# Patient Record
Sex: MALE | Race: WHITE | NOT HISPANIC OR LATINO | URBAN - METROPOLITAN AREA
[De-identification: names, ages, dates, MRNs, and addresses within clinical notes are randomized per-mention and may not be internally consistent; named-entity substitution may affect disease eponyms.]

---

## 2023-08-09 ENCOUNTER — OFFICE (OUTPATIENT)
Dept: URBAN - METROPOLITAN AREA PATHOLOGY 4 | Facility: PATHOLOGY | Age: 73
End: 2023-08-09
Payer: MEDICARE

## 2023-08-09 ENCOUNTER — OFFICE (OUTPATIENT)
Dept: URBAN - METROPOLITAN AREA PATHOLOGY 4 | Facility: PATHOLOGY | Age: 73
End: 2023-08-09
Payer: COMMERCIAL

## 2023-08-09 ENCOUNTER — ON CAMPUS - OUTPATIENT (OUTPATIENT)
Dept: URBAN - METROPOLITAN AREA HOSPITAL 2 | Facility: HOSPITAL | Age: 73
End: 2023-08-09
Payer: MEDICARE

## 2023-08-09 VITALS
DIASTOLIC BLOOD PRESSURE: 53 MMHG | RESPIRATION RATE: 16 BRPM | OXYGEN SATURATION: 99 % | SYSTOLIC BLOOD PRESSURE: 95 MMHG | HEART RATE: 80 BPM | DIASTOLIC BLOOD PRESSURE: 54 MMHG | DIASTOLIC BLOOD PRESSURE: 57 MMHG | SYSTOLIC BLOOD PRESSURE: 149 MMHG | SYSTOLIC BLOOD PRESSURE: 141 MMHG | DIASTOLIC BLOOD PRESSURE: 72 MMHG | OXYGEN SATURATION: 100 % | SYSTOLIC BLOOD PRESSURE: 121 MMHG | SYSTOLIC BLOOD PRESSURE: 96 MMHG | HEART RATE: 66 BPM | HEART RATE: 67 BPM | DIASTOLIC BLOOD PRESSURE: 67 MMHG | HEART RATE: 69 BPM | WEIGHT: 146 LBS | SYSTOLIC BLOOD PRESSURE: 137 MMHG | RESPIRATION RATE: 12 BRPM | DIASTOLIC BLOOD PRESSURE: 81 MMHG | SYSTOLIC BLOOD PRESSURE: 105 MMHG | SYSTOLIC BLOOD PRESSURE: 119 MMHG | DIASTOLIC BLOOD PRESSURE: 75 MMHG | HEART RATE: 74 BPM | SYSTOLIC BLOOD PRESSURE: 103 MMHG | HEART RATE: 75 BPM | DIASTOLIC BLOOD PRESSURE: 87 MMHG | HEART RATE: 73 BPM | SYSTOLIC BLOOD PRESSURE: 97 MMHG | DIASTOLIC BLOOD PRESSURE: 63 MMHG | DIASTOLIC BLOOD PRESSURE: 70 MMHG | HEIGHT: 75 IN | SYSTOLIC BLOOD PRESSURE: 163 MMHG | HEART RATE: 71 BPM | HEART RATE: 84 BPM | OXYGEN SATURATION: 98 % | HEART RATE: 79 BPM | TEMPERATURE: 97.3 F

## 2023-08-09 DIAGNOSIS — K57.30 DIVERTICULOSIS OF LARGE INTESTINE WITHOUT PERFORATION OR ABS: ICD-10-CM

## 2023-08-09 DIAGNOSIS — D12.0 BENIGN NEOPLASM OF CECUM: ICD-10-CM

## 2023-08-09 DIAGNOSIS — C15.9 MALIGNANT NEOPLASM OF ESOPHAGUS, UNSPECIFIED: ICD-10-CM

## 2023-08-09 DIAGNOSIS — D12.3 BENIGN NEOPLASM OF TRANSVERSE COLON: ICD-10-CM

## 2023-08-09 DIAGNOSIS — Z12.11 ENCOUNTER FOR SCREENING FOR MALIGNANT NEOPLASM OF COLON: ICD-10-CM

## 2023-08-09 DIAGNOSIS — R13.10 DYSPHAGIA, UNSPECIFIED: ICD-10-CM

## 2023-08-09 DIAGNOSIS — K44.9 DIAPHRAGMATIC HERNIA WITHOUT OBSTRUCTION OR GANGRENE: ICD-10-CM

## 2023-08-09 PROBLEM — D37.8 NEOPLASM OF UNCERTAIN BEHAVIOR OF OTHER SPECIFIED DIGESTIVE: Status: ACTIVE | Noted: 2023-08-09

## 2023-08-09 PROBLEM — K63.5 POLYP OF COLON: Status: ACTIVE | Noted: 2023-08-09

## 2023-08-09 LAB
GI HISTOLOGY: A. SELECT: (no result)
GI HISTOLOGY: B. UNSPECIFIED: (no result)
GI HISTOLOGY: C. UNSPECIFIED: (no result)
GI HISTOLOGY: PDF REPORT: (no result)

## 2023-08-09 PROCEDURE — 88305 TISSUE EXAM BY PATHOLOGIST: CPT | Mod: 26 | Performed by: INTERNAL MEDICINE

## 2023-08-09 PROCEDURE — 88342 IMHCHEM/IMCYTCHM 1ST ANTB: CPT | Mod: 26 | Performed by: INTERNAL MEDICINE

## 2023-08-09 PROCEDURE — 43239 EGD BIOPSY SINGLE/MULTIPLE: CPT | Performed by: INTERNAL MEDICINE

## 2023-08-09 PROCEDURE — 45385 COLONOSCOPY W/LESION REMOVAL: CPT | Mod: PT | Performed by: INTERNAL MEDICINE

## 2023-08-09 PROCEDURE — 88341 IMHCHEM/IMCYTCHM EA ADD ANTB: CPT | Mod: 26 | Performed by: INTERNAL MEDICINE

## 2023-08-11 PROBLEM — R13.10 DYSPHAGIA: Status: ACTIVE | Noted: 2023-08-11

## 2023-08-11 PROBLEM — K22.89 ESOPHAGEAL MASS: Status: ACTIVE | Noted: 2023-08-11

## 2023-08-11 PROBLEM — K21.9 GERD (GASTROESOPHAGEAL REFLUX DISEASE): Status: ACTIVE | Noted: 2023-08-11

## 2023-08-11 PROBLEM — K63.5 COLON POLYP: Status: ACTIVE | Noted: 2023-08-11

## 2023-08-11 PROBLEM — K57.90 DIVERTICULOSIS: Status: ACTIVE | Noted: 2023-08-11

## 2023-08-11 PROBLEM — K64.9 HEMORRHOIDS: Status: ACTIVE | Noted: 2023-08-11

## 2023-08-11 PROBLEM — Z87.19 HISTORY OF HIATAL HERNIA: Status: ACTIVE | Noted: 2023-08-11

## 2023-08-11 PROBLEM — J43.9 EMPHYSEMA LUNG: Status: ACTIVE | Noted: 2023-08-11

## 2023-08-11 NOTE — PROGRESS NOTES
HEMATOLOGY ONCOLOGY OUTPATIENT CONSULTATION       Patient name: Aravind Lim  : 1950  MRN: [id number]  Primary Care Physician: Provider, No Known  Referring Physician: Provider, No Known  Reason For Consult: Esophageal cancer      History of Present Illness:  Patient is a 73 y.o. male with recently diagnosed esophageal adenocarcinoma.    Patient has been having difficulty swallowing for the 5 months prior to presentation this is been progressing gradually.  Patient has been an active smoker with 50-pack-year smoking history.  He denies significant alcohol intake or GERD history.  His family history is positive for malignancy and 2 sisters and one nephew but he is unsure of the type of malignancy.  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  Enlarged mesenteric lymph node      40 pound weight loss, not a very good apettite  Subjective:  Patient presents for initial consultation.      Past Medical History:   Diagnosis Date    Anemia 2023    Anxiety 2022    Bilateral cataracts 2023    Colon polyp     Constipation 2022    BM daily w/ mineral oil.    COPD (chronic obstructive pulmonary disease)     COVID-19 2022    Dysphagia     Fatigue 2022    GERD (gastroesophageal reflux disease)     History of hiatal hernia     Impacted cerumen of right ear 2022    Prediabetes 05/10/2022       History reviewed. No pertinent surgical history.      Current Outpatient Medications:     albuterol sulfate  (90 Base) MCG/ACT inhaler, Inhale 2 puffs Every 4 (Four) Hours As Needed., Disp: , Rfl:     budesonide-formoterol (SYMBICORT)  "160-4.5 MCG/ACT inhaler, Inhale 2 puffs 2 (Two) Times a Day., Disp: , Rfl:     Cholecalciferol 50 MCG (2000 UT) tablet, Take 1 tablet by mouth Daily., Disp: , Rfl:     omeprazole (priLOSEC) 40 MG capsule, Take 1 capsule by mouth Every Morning., Disp: , Rfl:     Sorbitol 70 % solution, TAKE 100 ML BY MOUTH as directed FOR ONE DAY (Patient not taking: Reported on 8/21/2023), Disp: , Rfl:   No current facility-administered medications for this visit.    No Known Allergies    Family History   Problem Relation Age of Onset    Diabetes Mother     Diabetes Father     Cancer Sister         Unknown of type       Cancer-related family history includes Cancer in his sister.      Social History     Tobacco Use    Smoking status: Every Day     Packs/day: 1.00     Types: Cigarettes   Vaping Use    Vaping Use: Never used   Substance Use Topics    Alcohol use: Never    Drug use: Never     Social History     Social History Narrative    Not on file       ROS:   Review of Systems   Constitutional:  Positive for fatigue. Negative for fever.   HENT:  Positive for trouble swallowing. Negative for congestion and nosebleeds.    Eyes:  Negative for pain.   Respiratory:  Negative for cough and shortness of breath.    Cardiovascular:  Negative for chest pain.   Gastrointestinal:  Negative for abdominal pain, blood in stool, diarrhea, nausea and vomiting.   Endocrine: Negative for cold intolerance and heat intolerance.   Genitourinary:  Negative for difficulty urinating.   Musculoskeletal:  Negative for arthralgias.   Skin:  Negative for rash.   Neurological:  Negative for dizziness and headaches.   Hematological:  Does not bruise/bleed easily.   Psychiatric/Behavioral:  Negative for behavioral problems.        Objective:    Vital Signs:  Vitals:    08/14/23 1419   BP: 115/78   Pulse: 86   SpO2: 97%   Weight: 66.5 kg (146 lb 9.6 oz)   Height: 190.5 cm (75\")   PainSc: 0-No pain     Body mass index is 18.32 kg/mý.    ECOG  (1) Restricted in " physically strenuous activity, ambulatory and able to do work of light nature    Physical Exam:   Physical Exam  Constitutional:       Appearance: Normal appearance.   HENT:      Head: Normocephalic and atraumatic.   Eyes:      Pupils: Pupils are equal, round, and reactive to light.   Cardiovascular:      Rate and Rhythm: Normal rate and regular rhythm.      Pulses: Normal pulses.      Heart sounds: No murmur heard.  Pulmonary:      Effort: Pulmonary effort is normal.      Breath sounds: Normal breath sounds.   Abdominal:      General: There is no distension.      Palpations: Abdomen is soft. There is no mass.      Tenderness: There is no abdominal tenderness.   Musculoskeletal:         General: Normal range of motion.      Cervical back: Normal range of motion and neck supple.   Skin:     General: Skin is warm.   Neurological:      General: No focal deficit present.      Mental Status: He is alert.   Psychiatric:         Mood and Affect: Mood normal.       Lab Results - Last 18 Months   Lab Units 08/14/23  1418   WBC 10*3/mm3 8.31   HEMOGLOBIN g/dL 12.6*   HEMATOCRIT % 38.3   PLATELETS 10*3/mm3 306   MCV fL 89.9     Lab Results - Last 18 Months   Lab Units 08/14/23  1418   SODIUM mmol/L 131*   POTASSIUM mmol/L 4.6   CHLORIDE mmol/L 94*   CO2 mmol/L 26.0   BUN mg/dL 14   CREATININE mg/dL 0.77   CALCIUM mg/dL 9.4   BILIRUBIN mg/dL 0.2   ALK PHOS U/L 69   ALT (SGPT) U/L 12   AST (SGOT) U/L 17   GLUCOSE mg/dL 87       Lab Results   Component Value Date    GLUCOSE 87 08/14/2023    BUN 14 08/14/2023    CREATININE 0.77 08/14/2023    BCR 18.2 08/14/2023    K 4.6 08/14/2023    CO2 26.0 08/14/2023    CALCIUM 9.4 08/14/2023    ALBUMIN 4.1 08/14/2023    AST 17 08/14/2023    ALT 12 08/14/2023       No results for input(s): APTT, INR, PTT in the last 05378 hours.    No results found for: IRON, TIBC, FERRITIN    No results found for: FOLATE    No results found for: OCCULTBLD    No results found for: RETICCTPCT  No results found  for: [id number]  No results found for: SPEP, UPEP  No results found for: LDH, URICACID  No results found for: LEO, RF, SEDRATE  No results found for: FIBRINOGEN, HAPTOGLOBIN  No results found for: PTT, INR  No results found for:   No results found for: CEA  No components found for: CA-19-9  No results found for: PSA  No results found for: SEDRATE       Assessment & Plan     Patient is a 73-year-old male with esophageal adenocarcinoma of the distal esophagus.    Esophageal adenocarcinoma  Given CT imaging there is concern for metastatic lymph node in the mesentery  We will plan for PET/CT for staging  If no distant metastasis treatment would include chemoradiation followed by surgical resection  Patient rescheduled to see Dr. Colorado with radiation oncology, he has also been referred to Dr. Romero at [facility] with thoracic surgery  Will discuss patient's case at our multidisciplinary tumor board conference should have PET/CT results by then  If metastatic disease, will need NGS testing HER2 analysis and PD-L1 and start systemic treatment  If localized disease treatment as above      Follow-up after above work-up is done    Thank you very much for providing the opportunity to participate in this patient's care. Please do not hesitate to call if there are any other questions.  Time spent on encounter including record review, history taking, exam, discussion, counseling and documentation at: 60 minutes

## 2023-08-14 ENCOUNTER — LAB (OUTPATIENT)
Dept: LAB | Facility: HOSPITAL | Age: 73
End: 2023-08-14
Payer: MEDICARE

## 2023-08-14 ENCOUNTER — CONSULT (OUTPATIENT)
Dept: ONCOLOGY | Facility: CLINIC | Age: 73
End: 2023-08-14
Payer: MEDICARE

## 2023-08-14 VITALS
OXYGEN SATURATION: 97 % | WEIGHT: 146.6 LBS | SYSTOLIC BLOOD PRESSURE: 115 MMHG | HEIGHT: 75 IN | HEART RATE: 86 BPM | BODY MASS INDEX: 18.23 KG/M2 | DIASTOLIC BLOOD PRESSURE: 78 MMHG

## 2023-08-14 DIAGNOSIS — R93.5 ABNORMAL FINDINGS ON DIAGNOSTIC IMAGING OF OTHER ABDOMINAL REGIONS, INCLUDING RETROPERITONEUM: ICD-10-CM

## 2023-08-14 DIAGNOSIS — K22.89 ESOPHAGEAL MASS: Primary | ICD-10-CM

## 2023-08-14 LAB
ALBUMIN SERPL-MCNC: 4.1 G/DL (ref 3.5–5.2)
ALBUMIN/GLOB SERPL: 1.5 G/DL
ALP SERPL-CCNC: 69 U/L (ref 39–117)
ALT SERPL W P-5'-P-CCNC: 12 U/L (ref 1–41)
ANION GAP SERPL CALCULATED.3IONS-SCNC: 11 MMOL/L (ref 5–15)
AST SERPL-CCNC: 17 U/L (ref 1–40)
BASOPHILS # BLD AUTO: 0.01 10*3/MM3 (ref 0–0.2)
BASOPHILS NFR BLD AUTO: 0.1 % (ref 0–1.5)
BILIRUB SERPL-MCNC: 0.2 MG/DL (ref 0–1.2)
BUN SERPL-MCNC: 14 MG/DL (ref 8–23)
BUN/CREAT SERPL: 18.2 (ref 7–25)
CALCIUM SPEC-SCNC: 9.4 MG/DL (ref 8.6–10.5)
CHLORIDE SERPL-SCNC: 94 MMOL/L (ref 98–107)
CO2 SERPL-SCNC: 26 MMOL/L (ref 22–29)
CREAT SERPL-MCNC: 0.77 MG/DL (ref 0.76–1.27)
DEPRECATED RDW RBC AUTO: 44.1 FL (ref 37–54)
EGFRCR SERPLBLD CKD-EPI 2021: 95.1 ML/MIN/1.73
EOSINOPHIL # BLD AUTO: 0.16 10*3/MM3 (ref 0–0.4)
EOSINOPHIL NFR BLD AUTO: 1.9 % (ref 0.3–6.2)
ERYTHROCYTE [DISTWIDTH] IN BLOOD BY AUTOMATED COUNT: 13.8 % (ref 12.3–15.4)
GLOBULIN UR ELPH-MCNC: 2.7 GM/DL
GLUCOSE SERPL-MCNC: 87 MG/DL (ref 65–99)
HCT VFR BLD AUTO: 38.3 % (ref 37.5–51)
HGB BLD-MCNC: 12.6 G/DL (ref 13–17.7)
HOLD SPECIMEN: NORMAL
LYMPHOCYTES # BLD AUTO: 2.03 10*3/MM3 (ref 0.7–3.1)
LYMPHOCYTES NFR BLD AUTO: 24.4 % (ref 19.6–45.3)
MCH RBC QN AUTO: 29.6 PG (ref 26.6–33)
MCHC RBC AUTO-ENTMCNC: 32.9 G/DL (ref 31.5–35.7)
MCV RBC AUTO: 89.9 FL (ref 79–97)
MONOCYTES # BLD AUTO: 0.81 10*3/MM3 (ref 0.1–0.9)
MONOCYTES NFR BLD AUTO: 9.7 % (ref 5–12)
NEUTROPHILS NFR BLD AUTO: 5.3 10*3/MM3 (ref 1.7–7)
NEUTROPHILS NFR BLD AUTO: 63.9 % (ref 42.7–76)
PLATELET # BLD AUTO: 306 10*3/MM3 (ref 140–450)
PMV BLD AUTO: 9.3 FL (ref 6–12)
POTASSIUM SERPL-SCNC: 4.6 MMOL/L (ref 3.5–5.2)
PROT SERPL-MCNC: 6.8 G/DL (ref 6–8.5)
RBC # BLD AUTO: 4.26 10*6/MM3 (ref 4.14–5.8)
SODIUM SERPL-SCNC: 131 MMOL/L (ref 136–145)
WBC NRBC COR # BLD: 8.31 10*3/MM3 (ref 3.4–10.8)

## 2023-08-14 PROCEDURE — 99205 OFFICE O/P NEW HI 60 MIN: CPT | Performed by: INTERNAL MEDICINE

## 2023-08-14 PROCEDURE — 1126F AMNT PAIN NOTED NONE PRSNT: CPT | Performed by: INTERNAL MEDICINE

## 2023-08-14 PROCEDURE — 85025 COMPLETE CBC W/AUTO DIFF WBC: CPT | Performed by: INTERNAL MEDICINE

## 2023-08-14 PROCEDURE — 36415 COLL VENOUS BLD VENIPUNCTURE: CPT

## 2023-08-14 PROCEDURE — 80053 COMPREHEN METABOLIC PANEL: CPT | Performed by: INTERNAL MEDICINE

## 2023-08-14 RX ORDER — ALBUTEROL SULFATE 90 UG/1
2 AEROSOL, METERED RESPIRATORY (INHALATION) EVERY 4 HOURS PRN
COMMUNITY

## 2023-08-14 RX ORDER — OMEPRAZOLE 40 MG/1
1 CAPSULE, DELAYED RELEASE ORAL EVERY MORNING
COMMUNITY
Start: 2023-08-04

## 2023-08-14 RX ORDER — FAMOTIDINE 40 MG/1
40 TABLET, FILM COATED ORAL EVERY MORNING
COMMUNITY
Start: 2023-06-23 | End: 2023-08-14

## 2023-08-14 RX ORDER — BUDESONIDE AND FORMOTEROL FUMARATE DIHYDRATE 160; 4.5 UG/1; UG/1
2 AEROSOL RESPIRATORY (INHALATION) 2 TIMES DAILY
COMMUNITY

## 2023-08-14 NOTE — LETTER
2023     Heavenly Dominguez MD  [address] [facility] [place] [address]    Patient: Aravind Lim   YOB: 1950   Date of Visit: 2023     Dear Heavenly Dominguez MD:       Thank you for referring Aravind Lim to me for evaluation. Below are the relevant portions of my assessment and plan of care.    If you have questions, please do not hesitate to call me. I look forward to following Aravind along with you.         Sincerely,        Sophie Benito MD        CC: No Recipients    Sophie Benito MD  23 [id number]  Sign when Signing Visit                           HEMATOLOGY ONCOLOGY OUTPATIENT CONSULTATION       Patient name: Aravind Lim  : 1950  MRN: [id number]  Primary Care Physician: Provider, No Known  Referring Physician: Provider, No Known  Reason For Consult: Esophageal cancer      History of Present Illness:  Patient is a 73 y.o. male with recently diagnosed esophageal adenocarcinoma.    Patient has been having difficulty swallowing for the 5 months prior to presentation this is been progressing gradually.  Patient has been an active smoker with 50-pack-year smoking history.  He denies significant alcohol intake or GERD history.  His family history is positive for malignancy and 2 sisters and one nephew but he is unsure of the type of malignancy.  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  Enlarged mesenteric lymph node      40 pound weight loss, not a very good apettite  Subjective:  Patient presents for initial consultation.      Past Medical History:   Diagnosis Date    Anemia 2023    Anxiety 2022    Bilateral cataracts 2023     Colon polyp     Constipation 05/03/2022    BM daily w/ mineral oil.    COPD (chronic obstructive pulmonary disease)     COVID-19 08/16/2022    Dysphagia     Fatigue 05/03/2022    GERD (gastroesophageal reflux disease)     History of hiatal hernia     Impacted cerumen of right ear 05/03/2022    Prediabetes 05/10/2022       History reviewed. No pertinent surgical history.      Current Outpatient Medications:     albuterol sulfate  (90 Base) MCG/ACT inhaler, Inhale 2 puffs Every 4 (Four) Hours As Needed., Disp: , Rfl:     budesonide-formoterol (SYMBICORT) 160-4.5 MCG/ACT inhaler, Inhale 2 puffs 2 (Two) Times a Day., Disp: , Rfl:     Cholecalciferol 50 MCG (2000 UT) tablet, Take 1 tablet by mouth Daily., Disp: , Rfl:     omeprazole (priLOSEC) 40 MG capsule, Take 1 capsule by mouth Every Morning., Disp: , Rfl:     Sorbitol 70 % solution, TAKE 100 ML BY MOUTH as directed FOR ONE DAY (Patient not taking: Reported on 8/21/2023), Disp: , Rfl:   No current facility-administered medications for this visit.    No Known Allergies    Family History   Problem Relation Age of Onset    Diabetes Mother     Diabetes Father     Cancer Sister         Unknown of type       Cancer-related family history includes Cancer in his sister.      Social History     Tobacco Use    Smoking status: Every Day     Packs/day: 1.00     Types: Cigarettes   Vaping Use    Vaping Use: Never used   Substance Use Topics    Alcohol use: Never    Drug use: Never     Social History     Social History Narrative    Not on file       ROS:   Review of Systems   Constitutional:  Positive for fatigue. Negative for fever.   HENT:  Positive for trouble swallowing. Negative for congestion and nosebleeds.    Eyes:  Negative for pain.   Respiratory:  Negative for cough and shortness of breath.    Cardiovascular:  Negative for chest pain.   Gastrointestinal:  Negative for abdominal pain, blood in stool, diarrhea, nausea and vomiting.  "  Endocrine: Negative for cold intolerance and heat intolerance.   Genitourinary:  Negative for difficulty urinating.   Musculoskeletal:  Negative for arthralgias.   Skin:  Negative for rash.   Neurological:  Negative for dizziness and headaches.   Hematological:  Does not bruise/bleed easily.   Psychiatric/Behavioral:  Negative for behavioral problems.        Objective:    Vital Signs:  Vitals:    08/14/23 1419   BP: 115/78   Pulse: 86   SpO2: 97%   Weight: 66.5 kg (146 lb 9.6 oz)   Height: 190.5 cm (75\")   PainSc: 0-No pain     Body mass index is 18.32 kg/mý.    ECOG  (1) Restricted in physically strenuous activity, ambulatory and able to do work of light nature    Physical Exam:   Physical Exam  Constitutional:       Appearance: Normal appearance.   HENT:      Head: Normocephalic and atraumatic.   Eyes:      Pupils: Pupils are equal, round, and reactive to light.   Cardiovascular:      Rate and Rhythm: Normal rate and regular rhythm.      Pulses: Normal pulses.      Heart sounds: No murmur heard.  Pulmonary:      Effort: Pulmonary effort is normal.      Breath sounds: Normal breath sounds.   Abdominal:      General: There is no distension.      Palpations: Abdomen is soft. There is no mass.      Tenderness: There is no abdominal tenderness.   Musculoskeletal:         General: Normal range of motion.      Cervical back: Normal range of motion and neck supple.   Skin:     General: Skin is warm.   Neurological:      General: No focal deficit present.      Mental Status: He is alert.   Psychiatric:         Mood and Affect: Mood normal.       Lab Results - Last 18 Months   Lab Units 08/14/23  1418   WBC 10*3/mm3 8.31   HEMOGLOBIN g/dL 12.6*   HEMATOCRIT % 38.3   PLATELETS 10*3/mm3 306   MCV fL 89.9     Lab Results - Last 18 Months   Lab Units 08/14/23  1418   SODIUM mmol/L 131*   POTASSIUM mmol/L 4.6   CHLORIDE mmol/L 94*   CO2 mmol/L 26.0   BUN mg/dL 14   CREATININE mg/dL 0.77   CALCIUM mg/dL 9.4   BILIRUBIN mg/dL " 0.2   ALK PHOS U/L 69   ALT (SGPT) U/L 12   AST (SGOT) U/L 17   GLUCOSE mg/dL 87       Lab Results   Component Value Date    GLUCOSE 87 08/14/2023    BUN 14 08/14/2023    CREATININE 0.77 08/14/2023    BCR 18.2 08/14/2023    K 4.6 08/14/2023    CO2 26.0 08/14/2023    CALCIUM 9.4 08/14/2023    ALBUMIN 4.1 08/14/2023    AST 17 08/14/2023    ALT 12 08/14/2023       No results for input(s): APTT, INR, PTT in the last 05499 hours.    No results found for: IRON, TIBC, FERRITIN    No results found for: FOLATE    No results found for: OCCULTBLD    No results found for: RETICCTPCT  No results found for: [id number]  No results found for: SPEP, UPEP  No results found for: LDH, URICACID  No results found for: LEO, RF, SEDRATE  No results found for: FIBRINOGEN, HAPTOGLOBIN  No results found for: PTT, INR  No results found for:   No results found for: CEA  No components found for: CA-19-9  No results found for: PSA  No results found for: SEDRATE       Assessment & Plan     Patient is a 73-year-old male with esophageal adenocarcinoma of the distal esophagus.    Esophageal adenocarcinoma  Given CT imaging there is concern for metastatic lymph node in the mesentery  We will plan for PET/CT for staging  If no distant metastasis treatment would include chemoradiation followed by surgical resection  Patient rescheduled to see Dr. Colorado with radiation oncology, he has also been referred to Dr. Romero at [facility] with thoracic surgery  Will discuss patient's case at our multidisciplinary tumor board conference should have PET/CT results by then  If metastatic disease, will need NGS testing HER2 analysis and PD-L1 and start systemic treatment  If localized disease treatment as above      Follow-up after above work-up is done    Thank you very much for providing the opportunity to participate in this patient's care. Please do not hesitate to call if there are any other questions.  Time spent on encounter including record  review, history taking, exam, discussion, counseling and documentation at: 60 minutes

## 2023-08-15 ENCOUNTER — OFFICE (OUTPATIENT)
Dept: URBAN - METROPOLITAN AREA CLINIC 64 | Facility: CLINIC | Age: 73
End: 2023-08-15

## 2023-08-15 VITALS
HEART RATE: 77 BPM | DIASTOLIC BLOOD PRESSURE: 77 MMHG | HEIGHT: 75 IN | WEIGHT: 146 LBS | SYSTOLIC BLOOD PRESSURE: 135 MMHG

## 2023-08-15 DIAGNOSIS — R13.10 DYSPHAGIA, UNSPECIFIED: ICD-10-CM

## 2023-08-15 DIAGNOSIS — C15.9 MALIGNANT NEOPLASM OF ESOPHAGUS, UNSPECIFIED: ICD-10-CM

## 2023-08-15 PROCEDURE — 99213 OFFICE O/P EST LOW 20 MIN: CPT | Performed by: INTERNAL MEDICINE

## 2023-08-18 ENCOUNTER — TELEPHONE (OUTPATIENT)
Dept: RADIATION ONCOLOGY | Facility: HOSPITAL | Age: 73
End: 2023-08-18
Payer: MEDICARE

## 2023-08-18 ENCOUNTER — HOSPITAL ENCOUNTER (OUTPATIENT)
Dept: RADIATION ONCOLOGY | Facility: HOSPITAL | Age: 73
Setting detail: RADIATION/ONCOLOGY SERIES
End: 2023-08-18
Payer: MEDICARE

## 2023-08-21 ENCOUNTER — HOSPITAL ENCOUNTER (OUTPATIENT)
Dept: PET IMAGING | Facility: HOSPITAL | Age: 73
Discharge: HOME | End: 2023-08-21
Payer: MEDICARE

## 2023-08-21 ENCOUNTER — APPOINTMENT (OUTPATIENT)
Dept: OTHER | Facility: HOSPITAL | Age: 73
End: 2023-08-21
Payer: MEDICARE

## 2023-08-21 ENCOUNTER — CONSULT (OUTPATIENT)
Dept: RADIATION ONCOLOGY | Facility: HOSPITAL | Age: 73
End: 2023-08-21
Payer: MEDICARE

## 2023-08-21 VITALS
SYSTOLIC BLOOD PRESSURE: 129 MMHG | TEMPERATURE: 98 F | HEART RATE: 79 BPM | DIASTOLIC BLOOD PRESSURE: 73 MMHG | RESPIRATION RATE: 18 BRPM | WEIGHT: 144.2 LBS | BODY MASS INDEX: 17.93 KG/M2 | OXYGEN SATURATION: 97 % | HEIGHT: 75 IN

## 2023-08-21 DIAGNOSIS — K22.89 ESOPHAGEAL MASS: Primary | ICD-10-CM

## 2023-08-21 DIAGNOSIS — R93.5 ABNORMAL FINDINGS ON DIAGNOSTIC IMAGING OF OTHER ABDOMINAL REGIONS, INCLUDING RETROPERITONEUM: ICD-10-CM

## 2023-08-21 DIAGNOSIS — K22.89 ESOPHAGEAL MASS: ICD-10-CM

## 2023-08-21 PROBLEM — K21.9 HIATAL HERNIA WITH GASTROESOPHAGEAL REFLUX: Status: ACTIVE | Noted: 2023-06-23

## 2023-08-21 PROBLEM — K44.9 HIATAL HERNIA WITH GASTROESOPHAGEAL REFLUX: Status: ACTIVE | Noted: 2023-06-23

## 2023-08-21 PROBLEM — R53.83 FATIGUE: Status: RESOLVED | Noted: 2022-05-03 | Resolved: 2023-08-21

## 2023-08-21 PROBLEM — H61.21 IMPACTED CERUMEN OF RIGHT EAR: Status: RESOLVED | Noted: 2022-05-03 | Resolved: 2023-08-21

## 2023-08-21 PROBLEM — E55.9 VITAMIN D DEFICIENCY: Status: RESOLVED | Noted: 2022-05-10 | Resolved: 2023-08-21

## 2023-08-21 PROBLEM — F41.9 ANXIETY: Status: RESOLVED | Noted: 2022-06-07 | Resolved: 2023-08-21

## 2023-08-21 PROBLEM — D64.9 ANEMIA: Status: RESOLVED | Noted: 2023-08-04 | Resolved: 2023-08-21

## 2023-08-21 PROBLEM — R73.03 PREDIABETES: Status: RESOLVED | Noted: 2022-05-10 | Resolved: 2023-08-21

## 2023-08-21 PROBLEM — F17.200 SMOKER: Status: ACTIVE | Noted: 2022-05-03

## 2023-08-21 PROBLEM — J44.9 CHRONIC OBSTRUCTIVE LUNG DISEASE: Status: ACTIVE | Noted: 2022-05-03

## 2023-08-21 PROBLEM — U07.1 COVID-19: Status: RESOLVED | Noted: 2022-08-16 | Resolved: 2023-08-21

## 2023-08-21 PROBLEM — K59.00 CONSTIPATION: Status: RESOLVED | Noted: 2022-05-03 | Resolved: 2023-08-21

## 2023-08-21 PROBLEM — K64.9 HEMORRHOIDS: Status: ACTIVE | Noted: 2022-05-03

## 2023-08-21 PROBLEM — H26.9 BILATERAL CATARACTS: Status: RESOLVED | Noted: 2023-06-23 | Resolved: 2023-08-21

## 2023-08-21 LAB — GLUCOSE BLDC GLUCOMTR-MCNC: 90 MG/DL (ref 70–105)

## 2023-08-21 PROCEDURE — G0463 HOSPITAL OUTPT CLINIC VISIT: HCPCS | Performed by: RADIOLOGY

## 2023-08-21 PROCEDURE — 78815 PET IMAGE W/CT SKULL-THIGH: CPT

## 2023-08-21 PROCEDURE — 82948 REAGENT STRIP/BLOOD GLUCOSE: CPT

## 2023-08-21 PROCEDURE — 0 FLUDEOXYGLUCOSE F18 SOLUTION: Performed by: INTERNAL MEDICINE

## 2023-08-21 PROCEDURE — A9552 F18 FDG: HCPCS | Performed by: INTERNAL MEDICINE

## 2023-08-21 RX ORDER — SORBITOL 13.5 G/15ML
SOLUTION RECTAL
COMMUNITY
Start: 2023-07-24

## 2023-08-21 RX ADMIN — FLUDEOXYGLUCOSE F18 1 DOSE: 300 INJECTION INTRAVENOUS at 09:00

## 2023-08-21 NOTE — PROGRESS NOTES
RADIATION THERAPY CONSULT NOTE    NAME: Aravind Lim  YOB: 1950  MRN #: [id number]  DATE OF SERVICE: 8/21/2023  REFERRING PROVIDER: Sophie Benito MD  [address] [place] [address]  PRIMARY CARE PROVIDER: Provider, No Known    DIAGNOSIS:  Esophageal adenocarcinoma  No diagnosis found.  REASON FOR CONSULTATION/CHIEF COMPLAINT:  Esophageal Cancer  I was asked to see the patient at the request of the referring provider noted below for advice and recommendations regarding this diagnosis and the role of radiation therapy.                              REQUESTING PHYSICIAN:    Sophie Benito Md  [address] [place] [address]    RECORDS OBTAINED:  Records of the patients history including those obtained from the referring provider were reviewed and summarized in detail.    HISTORY OF PRESENT ILLNESS:  Aravind Lim is a 73 y.o. male with recently diagnosed esophageal cancer.  He initially noted dysphagia to solids and liquids over the last 3 months  Pain started increasing over that period and was worse prior to [facility] eval.  4 days prior to our visit he has noted that he is eating all foods and not having any pain or bloating sensation--feels much better.    Again this has all been over a 3-5 month period with gradual progression and associated weight loss.  He is an active smoker, > 50 pack year smoking history and denies any alcohol or GERD history.  Weight loss is ~ 40 lbs.  Patient knows family history is + but unsure of exact types of cancer.    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    8/15/2023--CT imaging with esophageal mass with involvement of the proximal stomach; enlarged mesenteric lymph node.    Pet/ct was just done earlier today with final read and I have reviewed; tumor board  is planned for tomorrow:  -2 cm right thoracic inlet node (SUV 16, consistent with met)  -5.5 cm large mass of the distal esophagus with SUV of 18.4  -Activity in the large distal esophageal mass is contiguous with hypermetabolic activity in the adjacent wall of the gastric cardia, SUV 8.71.  -Also very concerning is a hypermetabolic focus within the L4 posterior vertebral body with SUV max of 8.71.  -A 1.3 cm nodule of the right lung apex which is not hypermetabolic.        The following portions of the patient's history were reviewed and updated as appropriate: allergies, current medications, past family history, past medical history, past social history, past surgical history and problem list. Reviewed with the patient and remain unchanged.    PAST MEDICAL HISTORY:  he has a past medical history of Colon polyp, COPD (chronic obstructive pulmonary disease), Dysphagia, GERD (gastroesophageal reflux disease), and History of hiatal hernia.    MEDICATIONS:    Current Outpatient Medications:     albuterol sulfate  (90 Base) MCG/ACT inhaler, Inhale 2 puffs Every 4 (Four) Hours As Needed., Disp: , Rfl:     budesonide-formoterol (SYMBICORT) 160-4.5 MCG/ACT inhaler, Inhale 2 puffs 2 (Two) Times a Day., Disp: , Rfl:     Cholecalciferol 50 MCG (2000 UT) tablet, Take 1 tablet by mouth Daily., Disp: , Rfl:     omeprazole (priLOSEC) 40 MG capsule, Take 1 capsule by mouth Every Morning., Disp: , Rfl:     ALLERGIES:  No Known Allergies  PAST SURGICAL HISTORY:  he has no past surgical history on file. No pertinent surgical history.    PREVIOUS RADIOTHERAPY OR CHEMOTHERAPY:  None    FAMILY HISTORY:  hisfamily history includes Cancer in his sister; Diabetes in his father and mother.  Patient does not know type of malignancy.    SOCIAL HISTORY:  he reports that he has been smoking cigarettes. He has been smoking an average of 1 pack per day. He does not have any smokeless tobacco history on file. He reports that he does not  drink alcohol and does not use drugs.  Discussed referral to smoking cessation clinic--patient notes he will contemplate.    PAIN AND PAIN MANAGEMENT:  denies pain.  NUTRITIONAL STATUS:   no issues  KPS:  80:  Normal activity with effort; some signs or symptoms  PHQ-9 Total Score: distress tool completed     REVIEW OF SYSTEMS:   Review of Systems   General: No fevers, chills, or drenching night sweats. + weight loss as noted. Skin: No rashes or jaundice.  HEENT: No change in vision or hearing, no headaches.  Neck: No  masses.  Heme/Lymph: No easy bruising or bleeding.  Respiratory System: No shortness of breath or cough.  Cardiovascular: No chest pain, palpitations, or dyspnea on exertion.  - Pacemaker. GI: As noted above..  : No dysuria or hematuria.  Endocrine: No heat or cold intolerance. Musculoskeletal: No myalgias or arthralgias.  Neuro: No weakness, numbness, syncope, or seizures. Psych: No mood changes or depression. Ext: Denies swelling.      Objective   VITAL SIGNS:  Vitals:    08/21/23 1041   BP: 129/73   Pulse: 79   Resp: 18   Temp: 98 øF (36.7 øC)   SpO2: 97%       PHYSICAL EXAM:  GENERAL:  No apparent distress. Sitting comfortably in room.    HEENT:  Normocephalic, atraumatic. Pupils are equal, round, reactive to light. Sclera anicteric. Conjunctiva not injected. Oropharynx without erythema, ulcerations or thrush.   NECK:  Supple with no masses.  LYMPHATIC:  No cervical, supraclavicular or axillary adenopathy appreciated bilaterally.   CARDIOVASCULAR:  S1 & S2 detected; no murmurs, rubs or gallops.  CHEST:  Clear to auscultation bilaterally; no wheezes, crackles or rubs. Work of breathing normal.  ABDOMEN:  Bowel sounds present. Abdomen is soft, nontender, nondistended.   MUSCULOSKELETAL:  No tenderness to palpation along the spine or scapulae. Normal range of motion.  EXTREMITIES:  No clubbing, cyanosis, edema.  SKIN:  No erythema, rashes, ulcerations noted.   NEUROLOGIC:  Cranial nerves II-XII  grossly intact bilaterally. No focal neurologic deficits.  PSYCHIATRIC:  Alert, aware, and appropriate.      PERTINENT IMAGING/PATHOLOGY/LABS (Medical Decision Making):      COORDINATION OF CARE:  A copy of this note is sent to the referring provider.    PATHOLOGY (Reviewed): as noted above    IMAGING (Reviewed): PETCT is consistent with metastatic disease. Role for Biopsy, NGS testing to be discussed at tumor board tomorrow.    LABS (Reviewed):  HEMATOLOGY:  WBC   Date Value Ref Range Status   08/14/2023 8.31 3.40 - 10.80 10*3/mm3 Final     RBC   Date Value Ref Range Status   08/14/2023 4.26 4.14 - 5.80 10*6/mm3 Final     Hemoglobin   Date Value Ref Range Status   08/14/2023 12.6 (L) 13.0 - 17.7 g/dL Final     Hematocrit   Date Value Ref Range Status   08/14/2023 38.3 37.5 - 51.0 % Final     Platelets   Date Value Ref Range Status   08/14/2023 306 140 - 450 10*3/mm3 Final     CHEMISTRY:  Lab Results   Component Value Date    GLUCOSE 87 08/14/2023    BUN 14 08/14/2023    CREATININE 0.77 08/14/2023    BCR 18.2 08/14/2023    K 4.6 08/14/2023    CO2 26.0 08/14/2023    CALCIUM 9.4 08/14/2023    ALBUMIN 4.1 08/14/2023    AST 17 08/14/2023    ALT 12 08/14/2023     Assessment & Plan   ASSESSMENT AND PLAN:    74 yo male with 3-5 month history of dysphagia found to have poorly differentiated esophageal adenocarcinoma of the distal esophagus/GEJ with signet ring cells.  -Dysphagia and swallowing symptoms have improved in the last week and states back at his baseline.  -PET/CT is concerning for Rt SCV node and L4 vertebral body met  -Role for Biopsy (IR to determine if L4 can be targeted) and NGS testing, HER2, PDL-1 to be discussed at tumor board.  -No immediate need for palliation is indicated at this time.    This assessment comes from my review of the imaging, pathology, physician notes and other pertinent information as mentioned.    DISPOSITION:  We will remain available for palliative XRT  Anticipate full multi-D  discussion tomorrow in light of new findings showing stage IV disease and need for upfront starting systemic treatment.  Discussed with Dr. Benito.    TIME SPENT WITH PATIENT:  I spent 45 minutes caring for Aravind on this date of service. This time includes time spent by me in the following activities: preparing for the visit, reviewing tests, obtaining and/or reviewing a separately obtained history, performing a medically appropriate examination and/or evaluation, counseling and educating the patient/family/caregiver, referring and communicating with other health care professionals, documenting information in the medical record, independently interpreting results and communicating that information with the patient/family/caregiver, and care coordination           CC: Sophie Benito MD     Approved by:     Ghassan [place] MD

## 2023-08-22 DIAGNOSIS — K22.89 ESOPHAGEAL MASS: ICD-10-CM

## 2023-08-22 DIAGNOSIS — M89.9 LESION OF BONE OF LUMBOSACRAL SPINE: Primary | ICD-10-CM

## 2023-08-22 DIAGNOSIS — R93.5 ABNORMAL FINDINGS ON DIAGNOSTIC IMAGING OF OTHER ABDOMINAL REGIONS, INCLUDING RETROPERITONEUM: ICD-10-CM

## 2023-08-24 DIAGNOSIS — K22.89 ESOPHAGEAL MASS: Primary | ICD-10-CM

## 2023-08-25 NOTE — PROGRESS NOTES
HEMATOLOGY ONCOLOGY OUTPATIENT FOLLOW UP       Patient name: Aravind Lim  : 1950  MRN: [id number]  Primary Care Physician: Provider, No Known  Referring Physician: No ref. provider found  Reason For Consult: Esophageal cancer      History of Present Illness:  Patient is a 73 y.o. male with recently diagnosed esophageal adenocarcinoma.    Patient has been having difficulty swallowing for the 5 months prior to presentation this is been progressing gradually.  Patient has been an active smoker with 50-pack-year smoking history.  He denies significant alcohol intake or GERD history.  His family history is positive for malignancy and 2 sisters and one nephew but he is unsure of the type of malignancy.  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  Enlarged mesenteric lymph node    2023 -PET/CT with large distal esophageal mass extending to the digastric cardia compatible with neoplasm.  Hypermetabolic node in the right thoracic inlet compatible with metastatic disease.  Bone lesion of L4 is most compatible with metastatic disease.  Largest lung nodule was not hypermetabolic likely benign process    40 pound weight loss, not a very good apettite  Subjective:  Patient presents for follow-up.  Symptomatically still doing well has some difficulty tolerating food able to take protein drinks and softer diet      Past Medical History:   Diagnosis Date    Anemia 2023    Anxiety 2022    Bilateral cataracts 2023    Colon polyp     Constipation 2022    BM daily w/ mineral oil.    COPD (chronic obstructive pulmonary disease)     COVID-19 2022     Dysphagia     Fatigue 05/03/2022    GERD (gastroesophageal reflux disease)     History of hiatal hernia     Impacted cerumen of right ear 05/03/2022    Prediabetes 05/10/2022       No past surgical history on file.      Current Outpatient Medications:     albuterol sulfate  (90 Base) MCG/ACT inhaler, Inhale 2 puffs Every 4 (Four) Hours As Needed., Disp: , Rfl:     budesonide-formoterol (SYMBICORT) 160-4.5 MCG/ACT inhaler, Inhale 2 puffs 2 (Two) Times a Day., Disp: , Rfl:     Cholecalciferol 50 MCG (2000 UT) tablet, Take 1 tablet by mouth Daily., Disp: , Rfl:     omeprazole (priLOSEC) 40 MG capsule, Take 1 capsule by mouth Every Morning., Disp: , Rfl:     Sorbitol 70 % solution, TAKE 100 ML BY MOUTH as directed FOR ONE DAY (Patient not taking: Reported on 8/21/2023), Disp: , Rfl:     No Known Allergies    Family History   Problem Relation Age of Onset    Diabetes Mother     Diabetes Father     Cancer Sister         Unknown of type       Cancer-related family history includes Cancer in his sister.      Social History     Tobacco Use    Smoking status: Every Day     Packs/day: 1.00     Types: Cigarettes   Vaping Use    Vaping Use: Never used   Substance Use Topics    Alcohol use: Never    Drug use: Never     Social History     Social History Narrative    Not on file       ROS:   Review of Systems   Constitutional:  Positive for fatigue. Negative for fever.   HENT:  Positive for trouble swallowing. Negative for congestion and nosebleeds.    Eyes:  Negative for pain.   Respiratory:  Negative for cough and shortness of breath.    Cardiovascular:  Negative for chest pain.   Gastrointestinal:  Negative for abdominal pain, blood in stool, diarrhea, nausea and vomiting.   Endocrine: Negative for cold intolerance and heat intolerance.   Genitourinary:  Negative for difficulty urinating.   Musculoskeletal:  Negative for arthralgias.   Skin:  Negative for rash.   Neurological:  Negative for dizziness and headaches.  "  Hematological:  Does not bruise/bleed easily.   Psychiatric/Behavioral:  Negative for behavioral problems.        Objective:    Vital Signs:  Vitals:    08/28/23 1412   BP: 126/75   Pulse: 89   Resp: 16   Temp: 97.9 øF (36.6 øC)   SpO2: 97%   Weight: 65.3 kg (144 lb)   Height: 190.5 cm (75\")   PainSc: 0-No pain     Body mass index is 18 kg/mý.    ECOG  (1) Restricted in physically strenuous activity, ambulatory and able to do work of light nature    Physical Exam:   Physical Exam  Constitutional:       Appearance: Normal appearance.   HENT:      Head: Normocephalic and atraumatic.   Eyes:      Pupils: Pupils are equal, round, and reactive to light.   Cardiovascular:      Rate and Rhythm: Normal rate and regular rhythm.      Pulses: Normal pulses.      Heart sounds: No murmur heard.  Pulmonary:      Effort: Pulmonary effort is normal.      Breath sounds: Normal breath sounds.   Abdominal:      General: There is no distension.      Palpations: Abdomen is soft. There is no mass.      Tenderness: There is no abdominal tenderness.   Musculoskeletal:         General: Normal range of motion.      Cervical back: Normal range of motion and neck supple.   Skin:     General: Skin is warm.   Neurological:      General: No focal deficit present.      Mental Status: He is alert.   Psychiatric:         Mood and Affect: Mood normal.       Lab Results - Last 18 Months   Lab Units 08/28/23  1406 08/14/23  1418   WBC 10*3/mm3 7.31 8.31   HEMOGLOBIN g/dL 12.0* 12.6*   HEMATOCRIT % 35.8* 38.3   PLATELETS 10*3/mm3 318 306   MCV fL 90.6 89.9     Lab Results - Last 18 Months   Lab Units 08/14/23  1418   SODIUM mmol/L 131*   POTASSIUM mmol/L 4.6   CHLORIDE mmol/L 94*   CO2 mmol/L 26.0   BUN mg/dL 14   CREATININE mg/dL 0.77   CALCIUM mg/dL 9.4   BILIRUBIN mg/dL 0.2   ALK PHOS U/L 69   ALT (SGPT) U/L 12   AST (SGOT) U/L 17   GLUCOSE mg/dL 87       Lab Results   Component Value Date    GLUCOSE 87 08/14/2023    BUN 14 08/14/2023    CREATININE " 0.77 08/14/2023    BCR 18.2 08/14/2023    K 4.6 08/14/2023    CO2 26.0 08/14/2023    CALCIUM 9.4 08/14/2023    ALBUMIN 4.1 08/14/2023    AST 17 08/14/2023    ALT 12 08/14/2023       No results for input(s): APTT, INR, PTT in the last 30382 hours.    No results found for: IRON, TIBC, FERRITIN    No results found for: FOLATE    No results found for: OCCULTBLD    No results found for: RETICCTPCT  No results found for: KJRNTWCB28  No results found for: SPEP, UPEP  No results found for: LDH, URICACID  No results found for: LEO, RF, SEDRATE  No results found for: FIBRINOGEN, HAPTOGLOBIN  No results found for: PTT, INR  No results found for:   No results found for: CEA  No components found for: CA-19-9  No results found for: PSA  No results found for: SEDRATE       Assessment & Plan     Patient is a 73-year-old male with esophageal adenocarcinoma of the distal esophagus.    Esophageal adenocarcinoma  Given CT imaging there is concern for metastatic lymph node in the mesentery  PET/CT with hypermetabolic lymph node, metastasis in the lumbar spine L4  Case discussed at our multidisciplinary tumor board conference    Plan for CT-guided biopsy of the L4 lesion  If metastatic disease, will need NGS testing HER2 analysis and PD-L1 and start systemic treatment  If localized disease treatment as above plan for port placement as well    Follow-up after above work-up is done and treatment start    Thank you very much for providing the opportunity to participate in this patient's care. Please do not hesitate to call if there are any other questions.

## 2023-08-28 ENCOUNTER — APPOINTMENT (OUTPATIENT)
Dept: LAB | Facility: HOSPITAL | Age: 73
End: 2023-08-28
Payer: MEDICARE

## 2023-08-28 ENCOUNTER — OFFICE VISIT (OUTPATIENT)
Dept: ONCOLOGY | Facility: CLINIC | Age: 73
End: 2023-08-28
Payer: MEDICARE

## 2023-08-28 VITALS
RESPIRATION RATE: 16 BRPM | TEMPERATURE: 97.9 F | DIASTOLIC BLOOD PRESSURE: 75 MMHG | OXYGEN SATURATION: 97 % | HEART RATE: 89 BPM | SYSTOLIC BLOOD PRESSURE: 126 MMHG | HEIGHT: 75 IN | WEIGHT: 144 LBS | BODY MASS INDEX: 17.91 KG/M2

## 2023-08-28 DIAGNOSIS — K22.89 ESOPHAGEAL MASS: Primary | ICD-10-CM

## 2023-08-28 LAB
BASOPHILS # BLD AUTO: 0.01 10*3/MM3 (ref 0–0.2)
BASOPHILS NFR BLD AUTO: 0.1 % (ref 0–1.5)
DEPRECATED RDW RBC AUTO: 44.1 FL (ref 37–54)
EOSINOPHIL # BLD AUTO: 0.17 10*3/MM3 (ref 0–0.4)
EOSINOPHIL NFR BLD AUTO: 2.3 % (ref 0.3–6.2)
ERYTHROCYTE [DISTWIDTH] IN BLOOD BY AUTOMATED COUNT: 13.7 % (ref 12.3–15.4)
HCT VFR BLD AUTO: 35.8 % (ref 37.5–51)
HGB BLD-MCNC: 12 G/DL (ref 13–17.7)
HOLD SPECIMEN: NORMAL
HOLD SPECIMEN: NORMAL
LYMPHOCYTES # BLD AUTO: 1.73 10*3/MM3 (ref 0.7–3.1)
LYMPHOCYTES NFR BLD AUTO: 23.7 % (ref 19.6–45.3)
MCH RBC QN AUTO: 30.4 PG (ref 26.6–33)
MCHC RBC AUTO-ENTMCNC: 33.5 G/DL (ref 31.5–35.7)
MCV RBC AUTO: 90.6 FL (ref 79–97)
MONOCYTES # BLD AUTO: 0.77 10*3/MM3 (ref 0.1–0.9)
MONOCYTES NFR BLD AUTO: 10.5 % (ref 5–12)
NEUTROPHILS NFR BLD AUTO: 4.63 10*3/MM3 (ref 1.7–7)
NEUTROPHILS NFR BLD AUTO: 63.4 % (ref 42.7–76)
PLATELET # BLD AUTO: 318 10*3/MM3 (ref 140–450)
PMV BLD AUTO: 9.9 FL (ref 6–12)
RBC # BLD AUTO: 3.95 10*6/MM3 (ref 4.14–5.8)
WBC NRBC COR # BLD: 7.31 10*3/MM3 (ref 3.4–10.8)

## 2023-08-28 PROCEDURE — 85025 COMPLETE CBC W/AUTO DIFF WBC: CPT | Performed by: INTERNAL MEDICINE

## 2023-08-28 PROCEDURE — 1126F AMNT PAIN NOTED NONE PRSNT: CPT | Performed by: INTERNAL MEDICINE

## 2023-08-28 PROCEDURE — 99214 OFFICE O/P EST MOD 30 MIN: CPT | Performed by: INTERNAL MEDICINE

## 2023-08-28 PROCEDURE — 36415 COLL VENOUS BLD VENIPUNCTURE: CPT

## 2023-08-29 ENCOUNTER — HOSPITAL ENCOUNTER (OUTPATIENT)
Dept: INTERVENTIONAL RADIOLOGY/VASCULAR | Facility: HOSPITAL | Age: 73
Discharge: HOME | End: 2023-08-29
Payer: MEDICARE

## 2023-08-29 ENCOUNTER — HOSPITAL ENCOUNTER (OUTPATIENT)
Dept: CT IMAGING | Facility: HOSPITAL | Age: 73
Discharge: HOME | End: 2023-08-29
Payer: MEDICARE

## 2023-08-29 VITALS
SYSTOLIC BLOOD PRESSURE: 110 MMHG | HEART RATE: 71 BPM | RESPIRATION RATE: 15 BRPM | BODY MASS INDEX: 17.91 KG/M2 | WEIGHT: 144 LBS | TEMPERATURE: 98.4 F | DIASTOLIC BLOOD PRESSURE: 51 MMHG | HEIGHT: 75 IN | OXYGEN SATURATION: 100 %

## 2023-08-29 DIAGNOSIS — K22.89 ESOPHAGEAL MASS: ICD-10-CM

## 2023-08-29 DIAGNOSIS — R93.5 ABNORMAL FINDINGS ON DIAGNOSTIC IMAGING OF OTHER ABDOMINAL REGIONS, INCLUDING RETROPERITONEUM: ICD-10-CM

## 2023-08-29 DIAGNOSIS — M89.9 LESION OF BONE OF LUMBOSACRAL SPINE: ICD-10-CM

## 2023-08-29 LAB
APTT PPP: 28.6 SECONDS (ref 24–31)
BASOPHILS # BLD AUTO: 0 10*3/MM3 (ref 0–0.2)
BASOPHILS NFR BLD AUTO: 0.1 % (ref 0–1.5)
DEPRECATED RDW RBC AUTO: 45.5 FL (ref 37–54)
EOSINOPHIL # BLD AUTO: 0.1 10*3/MM3 (ref 0–0.4)
EOSINOPHIL NFR BLD AUTO: 1.1 % (ref 0.3–6.2)
ERYTHROCYTE [DISTWIDTH] IN BLOOD BY AUTOMATED COUNT: 14.4 % (ref 12.3–15.4)
HCT VFR BLD AUTO: 37.4 % (ref 37.5–51)
HGB BLD-MCNC: 12.2 G/DL (ref 13–17.7)
INR PPP: 0.94 (ref 0.93–1.1)
LYMPHOCYTES # BLD AUTO: 1.6 10*3/MM3 (ref 0.7–3.1)
LYMPHOCYTES NFR BLD AUTO: 23.8 % (ref 19.6–45.3)
MCH RBC QN AUTO: 29.7 PG (ref 26.6–33)
MCHC RBC AUTO-ENTMCNC: 32.6 G/DL (ref 31.5–35.7)
MCV RBC AUTO: 91.1 FL (ref 79–97)
MONOCYTES # BLD AUTO: 0.6 10*3/MM3 (ref 0.1–0.9)
MONOCYTES NFR BLD AUTO: 9 % (ref 5–12)
MRSA DNA SPEC QL NAA+PROBE: NORMAL
NEUTROPHILS NFR BLD AUTO: 4.6 10*3/MM3 (ref 1.7–7)
NEUTROPHILS NFR BLD AUTO: 66 % (ref 42.7–76)
NRBC BLD AUTO-RTO: 0.1 /100 WBC (ref 0–0.2)
PLATELET # BLD AUTO: 311 10*3/MM3 (ref 140–450)
PMV BLD AUTO: 8.3 FL (ref 6–12)
PROTHROMBIN TIME: 10.1 SECONDS (ref 9.6–11.7)
RBC # BLD AUTO: 4.11 10*6/MM3 (ref 4.14–5.8)
WBC NRBC COR # BLD: 6.9 10*3/MM3 (ref 3.4–10.8)

## 2023-08-29 PROCEDURE — 77001 FLUOROGUIDE FOR VEIN DEVICE: CPT

## 2023-08-29 PROCEDURE — 77012 CT SCAN FOR NEEDLE BIOPSY: CPT

## 2023-08-29 PROCEDURE — 88341 IMHCHEM/IMCYTCHM EA ADD ANTB: CPT | Performed by: INTERNAL MEDICINE

## 2023-08-29 PROCEDURE — 25010000002 FENTANYL CITRATE (PF) 50 MCG/ML SOLUTION: Performed by: RADIOLOGY

## 2023-08-29 PROCEDURE — 25010000002 CEFAZOLIN PER 500 MG: Performed by: RADIOLOGY

## 2023-08-29 PROCEDURE — 25010000002 HEPARIN LOCK FLUSH PER 10 UNITS: Performed by: RADIOLOGY

## 2023-08-29 PROCEDURE — 99152 MOD SED SAME PHYS/QHP 5/>YRS: CPT

## 2023-08-29 PROCEDURE — 85730 THROMBOPLASTIN TIME PARTIAL: CPT | Performed by: RADIOLOGY

## 2023-08-29 PROCEDURE — 88333 PATH CONSLTJ SURG CYTO XM 1: CPT | Performed by: INTERNAL MEDICINE

## 2023-08-29 PROCEDURE — 87641 MR-STAPH DNA AMP PROBE: CPT | Performed by: INTERNAL MEDICINE

## 2023-08-29 PROCEDURE — 25010000002 MIDAZOLAM PER 1 MG: Performed by: RADIOLOGY

## 2023-08-29 PROCEDURE — 88307 TISSUE EXAM BY PATHOLOGIST: CPT | Performed by: INTERNAL MEDICINE

## 2023-08-29 PROCEDURE — 0 LIDOCAINE 1 % SOLUTION: Performed by: RADIOLOGY

## 2023-08-29 PROCEDURE — 85025 COMPLETE CBC W/AUTO DIFF WBC: CPT | Performed by: RADIOLOGY

## 2023-08-29 PROCEDURE — 99153 MOD SED SAME PHYS/QHP EA: CPT

## 2023-08-29 PROCEDURE — 76937 US GUIDE VASCULAR ACCESS: CPT

## 2023-08-29 PROCEDURE — C1788 PORT, INDWELLING, IMP: HCPCS

## 2023-08-29 PROCEDURE — 25010000002 ONDANSETRON PER 1 MG: Performed by: RADIOLOGY

## 2023-08-29 PROCEDURE — 85610 PROTHROMBIN TIME: CPT | Performed by: RADIOLOGY

## 2023-08-29 PROCEDURE — 88342 IMHCHEM/IMCYTCHM 1ST ANTB: CPT | Performed by: INTERNAL MEDICINE

## 2023-08-29 PROCEDURE — 88311 DECALCIFY TISSUE: CPT | Performed by: INTERNAL MEDICINE

## 2023-08-29 RX ORDER — SODIUM CHLORIDE 0.9 % (FLUSH) 0.9 %
10 SYRINGE (ML) INJECTION EVERY 12 HOURS SCHEDULED
Status: DISCONTINUED | OUTPATIENT
Start: 2023-08-29 | End: 2023-08-31 | Stop reason: HOSPADM

## 2023-08-29 RX ORDER — LIDOCAINE HYDROCHLORIDE 10 MG/ML
INJECTION, SOLUTION INFILTRATION; PERINEURAL AS NEEDED
Status: COMPLETED | OUTPATIENT
Start: 2023-08-29 | End: 2023-08-29

## 2023-08-29 RX ORDER — LIDOCAINE HYDROCHLORIDE AND EPINEPHRINE 10; 10 MG/ML; UG/ML
INJECTION, SOLUTION INFILTRATION; PERINEURAL AS NEEDED
Status: COMPLETED | OUTPATIENT
Start: 2023-08-29 | End: 2023-08-29

## 2023-08-29 RX ORDER — SODIUM CHLORIDE 9 MG/ML
75 INJECTION, SOLUTION INTRAVENOUS CONTINUOUS
Status: DISCONTINUED | OUTPATIENT
Start: 2023-08-29 | End: 2023-08-31 | Stop reason: HOSPADM

## 2023-08-29 RX ORDER — ONDANSETRON 2 MG/ML
INJECTION INTRAMUSCULAR; INTRAVENOUS AS NEEDED
Status: COMPLETED | OUTPATIENT
Start: 2023-08-29 | End: 2023-08-29

## 2023-08-29 RX ORDER — FENTANYL CITRATE 50 UG/ML
INJECTION, SOLUTION INTRAMUSCULAR; INTRAVENOUS AS NEEDED
Status: COMPLETED | OUTPATIENT
Start: 2023-08-29 | End: 2023-08-29

## 2023-08-29 RX ORDER — HEPARIN SODIUM (PORCINE) LOCK FLUSH IV SOLN 100 UNIT/ML 100 UNIT/ML
SOLUTION INTRAVENOUS AS NEEDED
Status: COMPLETED | OUTPATIENT
Start: 2023-08-29 | End: 2023-08-29

## 2023-08-29 RX ORDER — SODIUM CHLORIDE 0.9 % (FLUSH) 0.9 %
10 SYRINGE (ML) INJECTION AS NEEDED
Status: DISCONTINUED | OUTPATIENT
Start: 2023-08-29 | End: 2023-08-31 | Stop reason: HOSPADM

## 2023-08-29 RX ORDER — MIDAZOLAM HYDROCHLORIDE 1 MG/ML
INJECTION INTRAMUSCULAR; INTRAVENOUS AS NEEDED
Status: COMPLETED | OUTPATIENT
Start: 2023-08-29 | End: 2023-08-29

## 2023-08-29 RX ADMIN — Medication 10 ML: at 11:30

## 2023-08-29 RX ADMIN — FENTANYL CITRATE 50 MCG: 50 INJECTION, SOLUTION INTRAMUSCULAR; INTRAVENOUS at 13:03

## 2023-08-29 RX ADMIN — LIDOCAINE HYDROCHLORIDE 5 ML: 10 INJECTION, SOLUTION INFILTRATION; PERINEURAL at 13:00

## 2023-08-29 RX ADMIN — MIDAZOLAM 1 MG: 1 INJECTION INTRAMUSCULAR; INTRAVENOUS at 13:00

## 2023-08-29 RX ADMIN — MIDAZOLAM 1 MG: 1 INJECTION INTRAMUSCULAR; INTRAVENOUS at 12:52

## 2023-08-29 RX ADMIN — FENTANYL CITRATE 100 MCG: 50 INJECTION, SOLUTION INTRAMUSCULAR; INTRAVENOUS at 12:52

## 2023-08-29 RX ADMIN — LIDOCAINE HYDROCHLORIDE AND EPINEPHRINE 7 ML: 10; 10 INJECTION, SOLUTION INFILTRATION; PERINEURAL at 13:39

## 2023-08-29 RX ADMIN — MIDAZOLAM 1 MG: 1 INJECTION INTRAMUSCULAR; INTRAVENOUS at 13:23

## 2023-08-29 RX ADMIN — ONDANSETRON 4 MG: 2 INJECTION INTRAMUSCULAR; INTRAVENOUS at 12:50

## 2023-08-29 RX ADMIN — SODIUM CHLORIDE 75 ML/HR: 9 INJECTION, SOLUTION INTRAVENOUS at 11:30

## 2023-08-29 RX ADMIN — LIDOCAINE HYDROCHLORIDE 3 ML: 10 INJECTION, SOLUTION INFILTRATION; PERINEURAL at 13:38

## 2023-08-29 RX ADMIN — FENTANYL CITRATE 50 MCG: 50 INJECTION, SOLUTION INTRAMUSCULAR; INTRAVENOUS at 13:40

## 2023-08-29 RX ADMIN — Medication 500 UNITS: at 13:44

## 2023-08-29 RX ADMIN — MIDAZOLAM 1 MG: 1 INJECTION INTRAMUSCULAR; INTRAVENOUS at 13:32

## 2023-08-29 RX ADMIN — CEFAZOLIN 1000 MG: 1 INJECTION, POWDER, FOR SOLUTION INTRAMUSCULAR; INTRAVENOUS at 13:21

## 2023-08-29 RX ADMIN — LIDOCAINE HYDROCHLORIDE 5 ML: 10 INJECTION, SOLUTION INFILTRATION; PERINEURAL at 13:44

## 2023-08-29 NOTE — H&P
[facility]   Interventional Radiology H&P    Patient Name: Aravind Lim  : 1950  MRN: [id number]  Primary Care Physician:  Provider, No Known  Referring Physician: Sophie Benito MD  Date of admission: 2023    Subjective   Subjective     HPI:  Aravind Lim is a 73 y.o. male with bone lesion and need for port.    Review of Systems:   Constitutional no fever,  no weight loss       Otolaryngeal no difficulty swallowing   Cardiovascular no chest pain   Pulmonary no cough, no sputum production   Gastrointestinal no constipation, no diarrhea                         Personal History       Past Medical/Surgical History:   Past Medical History:   Diagnosis Date    Anemia 2023    Anxiety 2022    Bilateral cataracts 2023    Colon polyp     Constipation 2022    BM daily w/ mineral oil.    COPD (chronic obstructive pulmonary disease)     COVID-19 2022    Dysphagia     Fatigue 2022    GERD (gastroesophageal reflux disease)     History of hiatal hernia     Impacted cerumen of right ear 2022    Prediabetes 05/10/2022     No past surgical history on file.    Social History:  reports that he has been smoking cigarettes. He has been smoking an average of 1 pack per day. He does not have any smokeless tobacco history on file. He reports that he does not drink alcohol and does not use drugs.    Medications:  (Not in a hospital admission)    Current medications:  sodium chloride, 10 mL, Intravenous, Q12H      Current IV drips:  sodium chloride, 75 mL/hr        Allergies:  No Known Allergies    Objective    Objective     Vitals:   Temp:  [98.4 øF (36.9 øC)] 98.4 øF (36.9 øC)  Heart Rate:  [86] 86  Resp:  [13] 13  BP: (157)/(60) 157/60      Physical Exam:   Constitutional: Awake, alert, No acute distress    Respiratory: Clear to auscultation bilaterally, nonlabored respirations    Cardiovascular: RRR, no murmurs, rubs, or gallops, palpable pedal pulses  bilaterally   Gastrointestinal: Positive bowel sounds, soft, nontender, nondistended        ASA SCALE ASSESSMENT:  2-Mild to moderate systemic disease, medically well controlled, with no functional limitation    MALLAMPATI CLASSIFICATION:  2-Able to visualize the soft palate, fauces, uvula. The anterior & posterior tonsilar pillars are hidden by the tongue.       Result Review        Result Review:     No results found for: NA    No results found for: K    No results found for: CL    No results found for: PLASMABICARB    No results found for: BUN    No results found for: CREATININE    No results found for: CALCIUM        No components found for: GLUCOSE.*  Results from last 7 days   Lab Units 08/29/23  1031   WBC 10*3/mm3 6.90   HEMOGLOBIN g/dL 12.2*   HEMATOCRIT % 37.4*   PLATELETS 10*3/mm3 311      Results from last 7 days   Lab Units 08/29/23  1031   INR  0.94           Assessment / Plan     Assesment:   L4 bone lesion.      Plan:   L4 biopsy and port placement.    The risks and benefits of the procedure were discussed with the patient.    Electronically signed by Trey Gordon MD, 08/29/23, 12:41 PM EDT.

## 2023-09-01 LAB
LAB AP CASE REPORT: NORMAL
LAB AP DIAGNOSIS COMMENT: NORMAL
Lab: NORMAL
PATH REPORT.FINAL DX SPEC: NORMAL
PATH REPORT.GROSS SPEC: NORMAL

## 2023-09-05 ENCOUNTER — HOSPITAL ENCOUNTER (OUTPATIENT)
Dept: NUCLEAR MEDICINE | Facility: HOSPITAL | Age: 73
Discharge: HOME | End: 2023-09-05
Payer: MEDICARE

## 2023-09-05 ENCOUNTER — HOSPITAL ENCOUNTER (OUTPATIENT)
Dept: INTERVENTIONAL RADIOLOGY/VASCULAR | Facility: HOSPITAL | Age: 73
Discharge: HOME | End: 2023-09-05
Admitting: RADIOLOGY
Payer: MEDICARE

## 2023-09-05 DIAGNOSIS — K22.89 ESOPHAGEAL MASS: ICD-10-CM

## 2023-09-05 DIAGNOSIS — Z45.2 ENCOUNTER FOR CARE RELATED TO PORT-A-CATH: ICD-10-CM

## 2023-09-05 PROCEDURE — A9503 TC99M MEDRONATE: HCPCS | Performed by: INTERNAL MEDICINE

## 2023-09-05 PROCEDURE — 78306 BONE IMAGING WHOLE BODY: CPT

## 2023-09-05 PROCEDURE — G0463 HOSPITAL OUTPT CLINIC VISIT: HCPCS

## 2023-09-05 PROCEDURE — 0 TECHNETIUM MEDRONATE KIT: Performed by: INTERNAL MEDICINE

## 2023-09-05 RX ORDER — TC 99M MEDRONATE 20 MG/10ML
26 INJECTION, POWDER, LYOPHILIZED, FOR SOLUTION INTRAVENOUS
Status: COMPLETED | OUTPATIENT
Start: 2023-09-05 | End: 2023-09-05

## 2023-09-05 RX ADMIN — TC 99M MEDRONATE 26 MILLICURIE: 20 INJECTION, POWDER, LYOPHILIZED, FOR SOLUTION INTRAVENOUS at 10:38

## 2023-09-05 NOTE — NURSING NOTE
Pt arrived for port site check, while in Nuclear Medicine dept for scan. Pt's right chest port site is clean and dry, showing no signs of redness or swelling. Right neck and chest still have some dermabond intact and steri strips intact. Pt reports that his port has not been accessed for use yet and he is not sure of when it will be. Pt denies any pain at port site. Skin around right chest incision is flesh, warm, and dry and the incision, itself, has edges that are approximated well. Overall, port site is healing well with no concerns for infection.

## 2023-09-08 ENCOUNTER — TELEPHONE (OUTPATIENT)
Dept: ONCOLOGY | Facility: CLINIC | Age: 73
End: 2023-09-08

## 2023-09-08 DIAGNOSIS — K22.89 ESOPHAGEAL MASS: Primary | ICD-10-CM

## 2023-09-08 RX ORDER — ONDANSETRON HYDROCHLORIDE 8 MG/1
8 TABLET, FILM COATED ORAL 3 TIMES DAILY PRN
Qty: 30 TABLET | Refills: 5 | Status: SHIPPED | OUTPATIENT
Start: 2023-09-08

## 2023-09-08 NOTE — TELEPHONE ENCOUNTER
I called and SW the patients wife who reports he is having issues swallowing again and he has lost another 6 lbs. I asked if he was drinking ensures and the wife started that they are to thick for him right now. I talked to Rene, the oncology dietitian and she recommended boost breeze which is more like juice and not typical ensures. The wife v/u and had no other questions. Dr. Benito was made aware of the patient situation.

## 2023-09-08 NOTE — TELEPHONE ENCOUNTER
Caller: RAHUL CEDILLO    Relationship: Emergency Contact    Best call back number: [phone]    What is the best time to reach you: ANYTIME    Who are you requesting to speak with (clinical staff, provider, specific staff member): CLINICAL    What was the call regarding: RAHUL STATED THAT PATIENT IS HAVING TROUBLE EATING AGAIN. SHE WANTED TO SPEAK WITH DR DIEZ OR THE NURSE ABOUT THIS.

## 2023-09-08 NOTE — PROGRESS NOTES
Per Dr. Benito plan was entered for FOLFOX with nivolumab. Referral was placed for a port as well.    No

## 2023-09-11 RX ORDER — SODIUM CHLORIDE 0.9 % (FLUSH) 0.9 %
20 SYRINGE (ML) INJECTION AS NEEDED
OUTPATIENT
Start: 2023-09-11

## 2023-09-11 RX ORDER — HEPARIN SODIUM (PORCINE) LOCK FLUSH IV SOLN 100 UNIT/ML 100 UNIT/ML
500 SOLUTION INTRAVENOUS AS NEEDED
OUTPATIENT
Start: 2023-09-11

## 2023-09-14 NOTE — PROGRESS NOTES
HEMATOLOGY ONCOLOGY OUTPATIENT FOLLOW UP       Patient name: Aravind Lim  : 1950  MRN: [id number]  Primary Care Physician: Abiodun Steele MD  Referring Physician: Provider, No Known  Reason For Consult: Esophageal cancer      History of Present Illness:  Patient is a 73 y.o. male with recently diagnosed esophageal adenocarcinoma.    Patient has been having difficulty swallowing for the 5 months prior to presentation this is been progressing gradually.  Patient has been an active smoker with 50-pack-year smoking history.  He denies significant alcohol intake or GERD history.  His family history is positive for malignancy and 2 sisters and one nephew but he is unsure of the type of malignancy.  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  Enlarged mesenteric lymph node    2023 -PET/CT with large distal esophageal mass extending to the digastric cardia compatible with neoplasm.  Hypermetabolic node in the right thoracic inlet compatible with metastatic disease.  Bone lesion of L4 is most compatible with metastatic disease.  Largest lung nodule was not hypermetabolic likely benign process  CARIS NGS negative for HER 2, low TMB, FGFR2 amplified, PDL1 CPS 3      23 - CT guided biopsy of L4 - metastatic poorly differentiated carcinoma. Likely from esophageal primary.  Subjective:  Patient presents for follow-up. Starting to lose more weight increased dysphagia. No other new symptoms.    Past Medical History:   Diagnosis Date    Anemia 2023    Anxiety 2022    Bilateral cataracts 2023    Colon polyp     Constipation 2022    BM daily  "w/ mineral oil.    COPD (chronic obstructive pulmonary disease)     COVID-19 08/16/2022    Dysphagia     Fatigue 05/03/2022    GERD (gastroesophageal reflux disease)     History of hiatal hernia     Impacted cerumen of right ear 05/03/2022    Prediabetes 05/10/2022       No past surgical history on file.      Current Outpatient Medications:     albuterol sulfate  (90 Base) MCG/ACT inhaler, Inhale 2 puffs Every 4 (Four) Hours As Needed., Disp: , Rfl:     budesonide-formoterol (SYMBICORT) 160-4.5 MCG/ACT inhaler, Inhale 2 puffs 2 (Two) Times a Day., Disp: , Rfl:     Cholecalciferol 50 MCG (2000 UT) tablet, Take 1 tablet by mouth Daily., Disp: , Rfl:     omeprazole (priLOSEC) 40 MG capsule, Take 1 capsule by mouth Every Morning., Disp: , Rfl:     ondansetron (ZOFRAN) 8 MG tablet, Take 1 tablet by mouth 3 (Three) Times a Day As Needed for Nausea or Vomiting., Disp: 30 tablet, Rfl: 5    lidocaine-prilocaine (EMLA) 2.5-2.5 % cream, Apply 1 application  topically to the appropriate area as directed As Needed (apply 30-60 minutes prior to port access)., Disp: 30 g, Rfl: 2    Sorbitol 70 % solution, TAKE 100 ML BY MOUTH as directed FOR ONE DAY (Patient not taking: Reported on 8/21/2023), Disp: , Rfl:     No Known Allergies    Family History   Problem Relation Age of Onset    Diabetes Mother     Diabetes Father     Cancer Sister         Unknown of type       Cancer-related family history includes Cancer in his sister.      Social History     Tobacco Use    Smoking status: Every Day     Packs/day: 1.00     Types: Cigarettes   Vaping Use    Vaping Use: Never used   Substance Use Topics    Alcohol use: Never    Drug use: Never     Social History     Social History Narrative    Not on file       Objective:    Vital Signs:  Vitals:    09/15/23 1016   BP: 124/75   Pulse: 71   Resp: 18   Temp: 98.2 °F (36.8 °C)   SpO2: 97%   Weight: 63.7 kg (140 lb 6.4 oz)   Height: 190.5 cm (75\")   PainSc: 0-No pain     Body mass index is " 17.55 kg/m².    ECOG  (1) Restricted in physically strenuous activity, ambulatory and able to do work of light nature    Physical Exam:   Physical Exam  Constitutional:       Appearance: Normal appearance.   HENT:      Head: Normocephalic and atraumatic.   Eyes:      Pupils: Pupils are equal, round, and reactive to light.   Cardiovascular:      Rate and Rhythm: Normal rate and regular rhythm.      Pulses: Normal pulses.      Heart sounds: No murmur heard.  Pulmonary:      Effort: Pulmonary effort is normal.      Breath sounds: Normal breath sounds.   Abdominal:      General: There is no distension.      Palpations: Abdomen is soft. There is no mass.      Tenderness: There is no abdominal tenderness.   Musculoskeletal:         General: Normal range of motion.      Cervical back: Normal range of motion and neck supple.   Skin:     General: Skin is warm.   Neurological:      General: No focal deficit present.      Mental Status: He is alert.   Psychiatric:         Mood and Affect: Mood normal.       Lab Results - Last 18 Months   Lab Units 09/25/23  0910 09/18/23  0746 08/29/23  1031   WBC 10*3/mm3 5.26 5.81 6.90   HEMOGLOBIN g/dL 11.3* 10.6* 12.2*   HEMATOCRIT % 34.1* 32.6* 37.4*   PLATELETS 10*3/mm3 241 284 311   MCV fL 91.4 91.1 91.1     Lab Results - Last 18 Months   Lab Units 09/18/23  0746 08/14/23  1418   SODIUM mmol/L 134* 131*   POTASSIUM mmol/L 4.2 4.6   CHLORIDE mmol/L 99 94*   CO2 mmol/L 25.0 26.0   BUN mg/dL 11 14   CREATININE mg/dL 0.71* 0.77   CALCIUM mg/dL 9.3 9.4   BILIRUBIN mg/dL 0.2 0.2   ALK PHOS U/L 76 69   ALT (SGPT) U/L 13 12   AST (SGOT) U/L 16 17   GLUCOSE mg/dL 86 87       Lab Results   Component Value Date    GLUCOSE 86 09/18/2023    BUN 11 09/18/2023    CREATININE 0.71 (L) 09/18/2023    BCR 15.5 09/18/2023    K 4.2 09/18/2023    CO2 25.0 09/18/2023    CALCIUM 9.3 09/18/2023    ALBUMIN 4.0 09/18/2023    AST 16 09/18/2023    ALT 13 09/18/2023       Lab Results - Last 18 Months   Lab Units  08/29/23  1031   INR  0.94   APTT seconds 28.6       No results found for: IRON, TIBC, FERRITIN    No results found for: FOLATE    No results found for: OCCULTBLD    No results found for: RETICCTPCT  No results found for: [id number]  No results found for: SPEP, UPEP  No results found for: LDH, URICACID  No results found for: LEO, RF, SEDRATE  No results found for: FIBRINOGEN, HAPTOGLOBIN  Lab Results   Component Value Date    PTT 28.6 08/29/2023    INR 0.94 08/29/2023     No results found for:   No results found for: CEA  No components found for: CA-19-9  No results found for: PSA  No results found for: SEDRATE       Assessment & Plan     Patient is a 73-year-old male with esophageal adenocarcinoma of the distal esophagus.    Esophageal adenocarcinoma  Given CT imaging there is concern for metastatic lymph node in the mesentery  PET/CT with hypermetabolic lymph node, metastasis in the lumbar spine L4  Case discussed at our multidisciplinary tumor board conference    S/p biopsy of spinal lesion with metastatic disease.   Discussed starting treatment with FOLFOX opdivo given negative HER and NGS profile though PDL1 is low CPS 3  Side effects discussed in detail, patient agreeable to start    Thank you very much for providing the opportunity to participate in this patient’s care. Please do not hesitate to call if there are any other questions.    Time spent on encounter including record review, history taking, exam, discussion, counseling and documentation at: 46 minutes

## 2023-09-15 ENCOUNTER — OFFICE VISIT (OUTPATIENT)
Dept: ONCOLOGY | Facility: CLINIC | Age: 73
End: 2023-09-15
Payer: MEDICARE

## 2023-09-15 ENCOUNTER — NUTRITION (OUTPATIENT)
Dept: ONCOLOGY | Facility: CLINIC | Age: 73
End: 2023-09-15
Payer: MEDICARE

## 2023-09-15 VITALS
WEIGHT: 140.4 LBS | TEMPERATURE: 98.2 F | SYSTOLIC BLOOD PRESSURE: 124 MMHG | RESPIRATION RATE: 18 BRPM | BODY MASS INDEX: 17.46 KG/M2 | DIASTOLIC BLOOD PRESSURE: 75 MMHG | OXYGEN SATURATION: 97 % | HEIGHT: 75 IN | HEART RATE: 71 BPM

## 2023-09-15 DIAGNOSIS — R93.5 ABNORMAL FINDINGS ON DIAGNOSTIC IMAGING OF OTHER ABDOMINAL REGIONS, INCLUDING RETROPERITONEUM: ICD-10-CM

## 2023-09-15 DIAGNOSIS — K22.89 ESOPHAGEAL MASS: Primary | ICD-10-CM

## 2023-09-15 NOTE — PROGRESS NOTES
TREATMENT  PREPARATION    Aravind Lim  [id number]  1950    Chief Complaint: Treatment preparation and needs assessment    History of present illness:  Aravind Lim is a 73 y.o. year old male who is here today for treatment preparation and needs assessment.  The patient has been diagnosed with   Encounter Diagnoses   Name Primary?    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       The current medication list and allergy list were reviewed and reconciled.     Past Medical History, Past Surgical History, Social History, Family History have been reviewed and are without significant changes except as mentioned.    Physical Exam:    Vitals:    09/18/23 0736   BP: 112/67   Pulse: 86   Resp: 18   Temp: 97.4 °F (36.3 °C)   SpO2: 98%     Vitals:    09/18/23 0736   PainSc: 0-No pain                      Physical Exam  Vitals reviewed.   Constitutional:       General: He is not in acute distress.     Appearance: Normal appearance.   HENT:      Head: Normocephalic and atraumatic.   Eyes:      Extraocular Movements: Extraocular movements intact.   Pulmonary:      Effort: Pulmonary effort is normal.   Chest:      Comments: Right sided chest IVAD  Musculoskeletal:      Cervical back: Normal range of motion.   Neurological:      Mental Status: He is alert and oriented to person, place, and time.   Psychiatric:         Mood and Affect: Mood normal.         Behavior: Behavior normal.         NEEDS ASSESSMENTS    Genetics  The patient's new diagnosis and family history have been reviewed for genetic counseling needs. The patient will not be referred..     Psychosocial and Barriers to care  None identified.  The patient will not be referred to our .        Nutrition  Patients beginning at risk treatment regimens or who have dietary concerns will also be referred to our oncology dietitian. The patient will be referred.    Functional Assessment  Persons who are age 70 or greater will be screened for qualification of a comprehensive geriatric assessment by our survivorship nurse practitioner.  Older adults with cancer face unique challenges. These may include an increased risk of drug reactions, financial burdens, and caregiver stress. The patient will not be referred.    Intravenous Access Assessment  The patient and I discussed planned intravenous chemo/biotherapy as well as other IV treatments that are often needed throughout the course of treatment. These may include, but are not limited to blood transfusions, antibiotics, and IV hydration. Discussed that depending on selected treatment and vein assessment, patient may require venous access device (VAD) which could include but not limited to a Mediport or PICC line. Risks and benefits of VADs reviewed. The patient will be treated via Port.    Reproductive/Sexual Activity   People should avoid becoming pregnant and should not get a partner pregnant while undergoing chemo/biotherapy.  People of childbearing age should use effective contraception during active therapy. The best recommendation for all people is to use a barrier method for a minimum of 1 week after the last infusion of chemo/biotherapy to prevent your partner being exposed to byproducts from treatment medications in bodily fluids. Effective contraception should be discussed with your oncology team to make sure it is safe to take based on your diagnosis. Possible options include oral contraceptives, barrier methods. Chemo/biotherapy can change your ability to reproduce children in the future.  There are options for fertility preservation. NOT APPLICABLE    Advanced Care Planning  Advance Care Planning   The patient and I discussed advanced care  "planning, \"Conversations that Matter\".   This service is offered for development of advance directives with a certified ACP facilitator.  The patient does not have an up-to-date advanced directive. This document is not on file with our office. The patient is not interested in an appointment with one of our facilitators to create or update their advanced directives.     The patient notes that he has a living will form at home that he needs to fill out.  He verbalizes understanding that he can request assistance if he needs it.          Smoking cessation  Tobacco Use: High Risk    Smoking Tobacco Use: Every Day    Smokeless Tobacco Use: Unknown    Passive Exposure: Not on file       Patient and I discussed their tobacco use history. Referral will not be made for smoking cessation.      Palliative Care  When appropriate, the patient and I discussed the availability palliative care services and when appropriate Hospice care. Palliative care is not the same as Hospice care which was explained to the patient.NOT APPLICABLE.    Survivorship   When appropriate, we discussed that we will refer the patient to survivorship clinic to discuss next steps following completion of planned treatment.  Reviewed this visit will include assessment of your physical, psychological, functional, and spiritual needs as a survivor and the need at attend this visit when scheduled.    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  The patient was given explanation of treatment premed side effects including office policy that prohibits patients to drive if sedating medications are administered, MD explanation given regarding benefits, side effects, " toxicities and goals of treatment.  The patient received a Chemotherapy/Biotherapy Plan Summary including diagnosis and explanation of specific treatment plan.    SIDE EFFECTS:  Common side effects were discussed with the patient and/or significant other.  Discussion included where applicable hair loss/discoloration, anemia/fatigue, infection/chills/fever, appetite, bleeding risk/precautions, constipation, diarrhea, mouth sores, taste alteration, loss of appetite, nausea/vomiting, peripheral neuropathy, skin/nail changes, rash, muscle aches/weakness, photosensitivity, weight gain/loss, hearing loss, dizziness, menopausal symptoms, menstrual irregularity, sterility, high blood pressure, heart damage, liver damage, lung damage, kidney damage, DVT/PE risk, fluid retention, pleural/pericardial effusion, somnolence, electrolyte/LFT imbalance, vein exercises and/or the possible need for vascular access/port placement.  The patient was advised that although uncommon, leakage of an infused medication from the vein or venous access device may lead to skin breakdown and/or other tissue damage.  The patient was advised that he/she may have pain, bleeding, and/or bruising from the insertion of a needle in their vein or venous access device (port).  The patient was further advised that, in spite of proper technique, infection with redness and irritation may rarely occur at the site where the needle was inserted.  The patient was advised that if complications occur, additional medical treatment is available.  Finally, where applicable we have reviewed rare but potential immune mediated side effects including shortness of breath, cough, chest pain (pneumonitis), abdominal pain, diarrhea (colitis), thyroiditis (hypothyroid or hyperthyroid), hepatitis and liver dysfunction, nephritis and renal dysfunction.    Discussion also included side effects specific to drugs in the treatment plan, specifically:    Treatment Plans       Name  Type Plan Dates Plan Provider         Active    OP ESOPHAGEAL mFOLFOX6 (Continuous Infusion Fluorouracil / Leucovorin / OXALIplatin) / Nivolumab ONCOLOGY TREATMENT  9/7/2023 - Present Sophie Benito MD                      Questions answered and additional information discussed on topics including:  Anemia, Thrombocytopenia, Neutropenia, Nutrition and appetite changes, Constipation, Diarrhea, Nausea & vomiting, Mouth sores, Alopecia, Intimate activity, Nervous system changes, Pain, Skin & nail changes, Organ toxicities, and Home care       Assessment and Plan:    Diagnoses and all orders for this visit:    1. Metastatic cancer to spine (Primary)    2. 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 Encounter for education    4. Encounter for venous access device care  -     lidocaine-prilocaine (EMLA) 2.5-2.5 % cream; Apply 1 application  topically to the appropriate area as directed As Needed (apply 30-60 minutes prior to port access).  Dispense: 30 g; Refill: 2    5. Dysphagia, unspecified type    The patient will be referred to meet with the dietitian.  Boost samples and coupons given at the visit today.  No orders of the defined types were placed in this encounter.        The patient and I have reviewed their diagnosis and scheduled treatment plan. Needs assessment was completed where applicable including genetics, psychosocial needs, barriers to care, VAD evaluation, advanced care planning, survivorship, and palliative care services where indicated. Referrals have been ordered as appropriate based upon evaluation today and patient desires.   Chemo/biotherapy teaching was completed today and consent obtained. See separate documentation for further details.  Adequate time was given to answer questions.  Patient made aware of their care team members and contact information if they have questions or problems throughout the treatment course.  Discussion held and written information provided describing frequency of office  visits and ongoing monitoring throughout the treatment plan.     Reviewed with patient any prescribed medication sent to pharmacy.  Education provided regarding proper storage, safe handling, and proper disposal of unused medication.  Proper handling of body fluids and waste discussed and written information provided.  If appropriate, patient had pretreatment labs drawn today.    Learning assessment completed at initial patient encounter. See separate flowsheet. Chemo/biotherapy education comprehension assessed at today's visit.    I spent 60 minutes caring for Aravind on this date of service. This time includes time spent by me in the following activities: preparing for the visit, reviewing tests, obtaining and/or reviewing a separately obtained history, performing a medically appropriate examination and/or evaluation, counseling and educating the patient/family/caregiver, ordering medications, tests, or procedures, referring and communicating with other health care professionals, and documenting information in the medical record.     Karen Guthrie, APRN   09/18/23

## 2023-09-18 ENCOUNTER — HOSPITAL ENCOUNTER (OUTPATIENT)
Dept: ONCOLOGY | Facility: HOSPITAL | Age: 73
Discharge: HOME | End: 2023-09-18
Admitting: INTERNAL MEDICINE
Payer: MEDICARE

## 2023-09-18 ENCOUNTER — OFFICE VISIT (OUTPATIENT)
Dept: ONCOLOGY | Facility: CLINIC | Age: 73
End: 2023-09-18
Payer: MEDICARE

## 2023-09-18 VITALS
WEIGHT: 142.3 LBS | SYSTOLIC BLOOD PRESSURE: 112 MMHG | HEART RATE: 86 BPM | DIASTOLIC BLOOD PRESSURE: 67 MMHG | BODY MASS INDEX: 17.79 KG/M2 | OXYGEN SATURATION: 98 % | TEMPERATURE: 97.4 F | RESPIRATION RATE: 18 BRPM

## 2023-09-18 VITALS
BODY MASS INDEX: 17.66 KG/M2 | TEMPERATURE: 97.4 F | HEART RATE: 86 BPM | HEIGHT: 75 IN | RESPIRATION RATE: 18 BRPM | DIASTOLIC BLOOD PRESSURE: 67 MMHG | WEIGHT: 142 LBS | OXYGEN SATURATION: 98 % | SYSTOLIC BLOOD PRESSURE: 112 MMHG

## 2023-09-18 DIAGNOSIS — Z45.2 ENCOUNTER FOR VENOUS ACCESS DEVICE CARE: ICD-10-CM

## 2023-09-18 DIAGNOSIS — C78.89 METASTATIC ADENOCARCINOMA TO ESOPHAGUS: ICD-10-CM

## 2023-09-18 DIAGNOSIS — C79.51 METASTATIC CANCER TO SPINE: Primary | ICD-10-CM

## 2023-09-18 DIAGNOSIS — Z71.9 ENCOUNTER FOR EDUCATION: ICD-10-CM

## 2023-09-18 DIAGNOSIS — R13.10 DYSPHAGIA, UNSPECIFIED TYPE: ICD-10-CM

## 2023-09-18 DIAGNOSIS — K22.89 ESOPHAGEAL MASS: Primary | ICD-10-CM

## 2023-09-18 LAB
ALBUMIN SERPL-MCNC: 4 G/DL (ref 3.5–5.2)
ALBUMIN/GLOB SERPL: 1.7 G/DL
ALP SERPL-CCNC: 76 U/L (ref 39–117)
ALT SERPL W P-5'-P-CCNC: 13 U/L (ref 1–41)
ANION GAP SERPL CALCULATED.3IONS-SCNC: 10 MMOL/L (ref 5–15)
AST SERPL-CCNC: 16 U/L (ref 1–40)
BASOPHILS # BLD AUTO: 0.01 10*3/MM3 (ref 0–0.2)
BASOPHILS NFR BLD AUTO: 0.2 % (ref 0–1.5)
BILIRUB SERPL-MCNC: 0.2 MG/DL (ref 0–1.2)
BUN SERPL-MCNC: 11 MG/DL (ref 8–23)
BUN/CREAT SERPL: 15.5 (ref 7–25)
CALCIUM SPEC-SCNC: 9.3 MG/DL (ref 8.6–10.5)
CHLORIDE SERPL-SCNC: 99 MMOL/L (ref 98–107)
CO2 SERPL-SCNC: 25 MMOL/L (ref 22–29)
CREAT SERPL-MCNC: 0.71 MG/DL (ref 0.76–1.27)
DEPRECATED RDW RBC AUTO: 42.8 FL (ref 37–54)
EGFRCR SERPLBLD CKD-EPI 2021: 97.5 ML/MIN/1.73
EOSINOPHIL # BLD AUTO: 0.11 10*3/MM3 (ref 0–0.4)
EOSINOPHIL NFR BLD AUTO: 1.9 % (ref 0.3–6.2)
ERYTHROCYTE [DISTWIDTH] IN BLOOD BY AUTOMATED COUNT: 13.3 % (ref 12.3–15.4)
GLOBULIN UR ELPH-MCNC: 2.4 GM/DL
GLUCOSE BLDC GLUCOMTR-MCNC: 82 MG/DL (ref 70–105)
GLUCOSE SERPL-MCNC: 86 MG/DL (ref 65–99)
HCT VFR BLD AUTO: 32.6 % (ref 37.5–51)
HGB BLD-MCNC: 10.6 G/DL (ref 13–17.7)
LYMPHOCYTES # BLD AUTO: 1.5 10*3/MM3 (ref 0.7–3.1)
LYMPHOCYTES NFR BLD AUTO: 25.8 % (ref 19.6–45.3)
MCH RBC QN AUTO: 29.6 PG (ref 26.6–33)
MCHC RBC AUTO-ENTMCNC: 32.5 G/DL (ref 31.5–35.7)
MCV RBC AUTO: 91.1 FL (ref 79–97)
MONOCYTES # BLD AUTO: 0.71 10*3/MM3 (ref 0.1–0.9)
MONOCYTES NFR BLD AUTO: 12.2 % (ref 5–12)
NEUTROPHILS NFR BLD AUTO: 3.48 10*3/MM3 (ref 1.7–7)
NEUTROPHILS NFR BLD AUTO: 59.9 % (ref 42.7–76)
PLATELET # BLD AUTO: 284 10*3/MM3 (ref 140–450)
PMV BLD AUTO: 10.6 FL (ref 6–12)
POTASSIUM SERPL-SCNC: 4.2 MMOL/L (ref 3.5–5.2)
PROT SERPL-MCNC: 6.4 G/DL (ref 6–8.5)
RBC # BLD AUTO: 3.58 10*6/MM3 (ref 4.14–5.8)
SODIUM SERPL-SCNC: 134 MMOL/L (ref 136–145)
T4 FREE SERPL-MCNC: 1.33 NG/DL (ref 0.93–1.7)
TSH SERPL DL<=0.05 MIU/L-ACNC: 5.13 UIU/ML (ref 0.27–4.2)
WBC NRBC COR # BLD: 5.81 10*3/MM3 (ref 3.4–10.8)

## 2023-09-18 PROCEDURE — 96367 TX/PROPH/DG ADDL SEQ IV INF: CPT

## 2023-09-18 PROCEDURE — 80053 COMPREHEN METABOLIC PANEL: CPT | Performed by: INTERNAL MEDICINE

## 2023-09-18 PROCEDURE — 25010000002 FLUOROURACIL PER 500 MG: Performed by: INTERNAL MEDICINE

## 2023-09-18 PROCEDURE — 96413 CHEMO IV INFUSION 1 HR: CPT

## 2023-09-18 PROCEDURE — 25010000002 LEUCOVORIN 200 MG RECONSTITUTED SOLUTION 200 MG VIAL: Performed by: INTERNAL MEDICINE

## 2023-09-18 PROCEDURE — 96417 CHEMO IV INFUS EACH ADDL SEQ: CPT

## 2023-09-18 PROCEDURE — 96411 CHEMO IV PUSH ADDL DRUG: CPT

## 2023-09-18 PROCEDURE — 25010000002 NIVOLUMAB 240 MG/24ML SOLUTION 24 ML VIAL: Performed by: INTERNAL MEDICINE

## 2023-09-18 PROCEDURE — 25010000002 PALONOSETRON 0.25 MG/5ML SOLUTION PREFILLED SYRINGE: Performed by: INTERNAL MEDICINE

## 2023-09-18 PROCEDURE — 96368 THER/DIAG CONCURRENT INF: CPT

## 2023-09-18 PROCEDURE — 96415 CHEMO IV INFUSION ADDL HR: CPT

## 2023-09-18 PROCEDURE — 25010000002 OXALIPLATIN PER 0.5 MG: Performed by: INTERNAL MEDICINE

## 2023-09-18 PROCEDURE — 25010000002 DEXAMETHASONE SODIUM PHOSPHATE 120 MG/30ML SOLUTION: Performed by: INTERNAL MEDICINE

## 2023-09-18 PROCEDURE — G0498 CHEMO EXTEND IV INFUS W/PUMP: HCPCS

## 2023-09-18 PROCEDURE — 96375 TX/PRO/DX INJ NEW DRUG ADDON: CPT

## 2023-09-18 PROCEDURE — 96416 CHEMO PROLONG INFUSE W/PUMP: CPT

## 2023-09-18 PROCEDURE — 84439 ASSAY OF FREE THYROXINE: CPT | Performed by: INTERNAL MEDICINE

## 2023-09-18 PROCEDURE — 85025 COMPLETE CBC W/AUTO DIFF WBC: CPT | Performed by: INTERNAL MEDICINE

## 2023-09-18 PROCEDURE — 82948 REAGENT STRIP/BLOOD GLUCOSE: CPT

## 2023-09-18 PROCEDURE — 84443 ASSAY THYROID STIM HORMONE: CPT | Performed by: INTERNAL MEDICINE

## 2023-09-18 RX ORDER — DEXTROSE MONOHYDRATE 50 MG/ML
250 INJECTION, SOLUTION INTRAVENOUS ONCE
Status: COMPLETED | OUTPATIENT
Start: 2023-09-18 | End: 2023-09-18

## 2023-09-18 RX ORDER — FLUOROURACIL 50 MG/ML
400 INJECTION, SOLUTION INTRAVENOUS ONCE
Status: COMPLETED | OUTPATIENT
Start: 2023-09-18 | End: 2023-09-18

## 2023-09-18 RX ORDER — PALONOSETRON 0.05 MG/ML
0.25 INJECTION, SOLUTION INTRAVENOUS ONCE
Status: COMPLETED | OUTPATIENT
Start: 2023-09-18 | End: 2023-09-18

## 2023-09-18 RX ORDER — DIPHENHYDRAMINE HYDROCHLORIDE 50 MG/ML
50 INJECTION INTRAMUSCULAR; INTRAVENOUS AS NEEDED
Status: CANCELLED | OUTPATIENT
Start: 2023-09-18

## 2023-09-18 RX ORDER — LIDOCAINE AND PRILOCAINE 25; 25 MG/G; MG/G
1 CREAM TOPICAL AS NEEDED
Qty: 30 G | Refills: 2 | Status: SHIPPED | OUTPATIENT
Start: 2023-09-18

## 2023-09-18 RX ORDER — FAMOTIDINE 10 MG/ML
20 INJECTION, SOLUTION INTRAVENOUS AS NEEDED
Status: CANCELLED | OUTPATIENT
Start: 2023-09-18

## 2023-09-18 RX ORDER — PALONOSETRON 0.05 MG/ML
0.25 INJECTION, SOLUTION INTRAVENOUS ONCE
Status: CANCELLED | OUTPATIENT
Start: 2023-09-18

## 2023-09-18 RX ORDER — DEXTROSE MONOHYDRATE 50 MG/ML
250 INJECTION, SOLUTION INTRAVENOUS ONCE
Status: CANCELLED | OUTPATIENT
Start: 2023-09-18

## 2023-09-18 RX ORDER — FLUOROURACIL 50 MG/ML
400 INJECTION, SOLUTION INTRAVENOUS ONCE
Status: CANCELLED | OUTPATIENT
Start: 2023-09-18

## 2023-09-18 RX ADMIN — LEUCOVORIN CALCIUM 760 MG: 200 INJECTION, POWDER, LYOPHILIZED, FOR SOLUTION INTRAMUSCULAR; INTRAVENOUS at 10:24

## 2023-09-18 RX ADMIN — OXALIPLATIN 160 MG: 5 INJECTION, SOLUTION INTRAVENOUS at 10:26

## 2023-09-18 RX ADMIN — SODIUM CHLORIDE 240 MG: 9 INJECTION, SOLUTION INTRAVENOUS at 09:24

## 2023-09-18 RX ADMIN — DEXAMETHASONE SODIUM PHOSPHATE 12 MG: 4 INJECTION, SOLUTION INTRA-ARTICULAR; INTRALESIONAL; INTRAMUSCULAR; INTRAVENOUS; SOFT TISSUE at 09:59

## 2023-09-18 RX ADMIN — FLUOROURACIL 760 MG: 50 INJECTION, SOLUTION INTRAVENOUS at 12:32

## 2023-09-18 RX ADMIN — PALONOSETRON 0.25 MG: 0.25 INJECTION, SOLUTION INTRAVENOUS at 09:56

## 2023-09-18 RX ADMIN — FLUOROURACIL 4580 MG: 50 INJECTION, SOLUTION INTRAVENOUS at 12:37

## 2023-09-18 RX ADMIN — DEXTROSE MONOHYDRATE 250 ML: 50 INJECTION, SOLUTION INTRAVENOUS at 09:21

## 2023-09-18 NOTE — PROGRESS NOTES
Message below sent to MD  Pt. is getting his first cycle of opdivo, Folfox, pt. is scheduled to 4 teeth pulled on Mon. 9/25 and his next treatment will be the following Mon. on 10/2. Is that ok?   Response per Dr. Benito  he should get his cbc checked that day before per Dr. Benito  Pt. Scheduled for lab draw Mon. 9/25/23, pt's dental appointment is at 1:00PM the same day.   Lab results need to be reviewed by Karen SEGOVIA or Dr. Benito prior to discharging pt. For his dental appointment.   Pt. Verbalized understanding of all orders/instructions.   Pt's wife stated that the dentist office was needing a letter stating if was ok for pt. To have his teeth pulled and ok for pt. To be started on any medications such as antibiotics or pain meds.   Message sent to Nora Perez MA concerning the need for a letter per Dr. Benito.

## 2023-09-20 ENCOUNTER — HOSPITAL ENCOUNTER (OUTPATIENT)
Dept: ONCOLOGY | Facility: HOSPITAL | Age: 73
Discharge: HOME | End: 2023-09-20
Admitting: INTERNAL MEDICINE
Payer: MEDICARE

## 2023-09-20 DIAGNOSIS — K22.89 ESOPHAGEAL MASS: Primary | ICD-10-CM

## 2023-09-20 PROCEDURE — 25010000002 HEPARIN LOCK FLUSH PER 10 UNITS: Performed by: INTERNAL MEDICINE

## 2023-09-20 RX ORDER — HEPARIN SODIUM (PORCINE) LOCK FLUSH IV SOLN 100 UNIT/ML 100 UNIT/ML
500 SOLUTION INTRAVENOUS AS NEEDED
OUTPATIENT
Start: 2023-09-20

## 2023-09-20 RX ORDER — SODIUM CHLORIDE 0.9 % (FLUSH) 0.9 %
20 SYRINGE (ML) INJECTION AS NEEDED
OUTPATIENT
Start: 2023-09-20

## 2023-09-20 RX ORDER — HEPARIN SODIUM (PORCINE) LOCK FLUSH IV SOLN 100 UNIT/ML 100 UNIT/ML
500 SOLUTION INTRAVENOUS AS NEEDED
Status: DISCONTINUED | OUTPATIENT
Start: 2023-09-20 | End: 2023-09-21 | Stop reason: HOSPADM

## 2023-09-20 RX ORDER — SODIUM CHLORIDE 0.9 % (FLUSH) 0.9 %
20 SYRINGE (ML) INJECTION AS NEEDED
Status: DISCONTINUED | OUTPATIENT
Start: 2023-09-20 | End: 2023-09-21 | Stop reason: HOSPADM

## 2023-09-20 RX ADMIN — Medication 20 ML: at 14:47

## 2023-09-20 RX ADMIN — HEPARIN 500 UNITS: 100 SYRINGE at 14:47

## 2023-09-20 NOTE — PROGRESS NOTES
Pt here for chemo pump d/c. 5FU pump is empty. Port aspirated and positive blood return noted. Port flushed with 20 ml NS and heparin 500 units, then de-accessed. Pt tolerated well.

## 2023-09-22 ENCOUNTER — TELEPHONE (OUTPATIENT)
Dept: ONCOLOGY | Facility: CLINIC | Age: 73
End: 2023-09-22
Payer: MEDICARE

## 2023-09-22 NOTE — TELEPHONE ENCOUNTER
pt's spouse would like to know since pt had bag removed on wednesday, if he's allowed to drink cold items

## 2023-09-22 NOTE — TELEPHONE ENCOUNTER
I spoke with patient's spouse. I explained that he can start drinking cold drinks slowly to see how he reacts to it, but there shouldn't be any issues after a few more days. She confirmed and had no other questions.

## 2023-09-22 NOTE — PROGRESS NOTES
"                 Nutrition Assessment  Aravind Lim    Height: 6'3\"  Weight: 140.4#  BMI: 17.5  Weight Hx:   09/15/23 63.7 kg (140 lb 6.4 oz)   08/29/23 65.3 kg (144 lb)   08/28/23 65.3 kg (144 lb)   08/21/23 65.4 kg (144 lb 3.2 oz)   08/14/23 66.5 kg (146 lb 9.6 oz)     IBW: 196# (71.6%)  UBW: 190# (73.8%)    Nutrition Questionnaire    Food Allergies: Pt denied allergies at this time    Chewing Problems: Pt denied chewing problems at this time.     Swallowing Problems: Foods get stuck near tumor at the lower portion of his esophagus. Pt is unable to swallow bread. Pt said he can swallow other foods as long as he chews them more than thoroughly and takes only small bites. Pt said it took him close to an hour and a half to eat a pork chop.    Who does the cooking & shopping: Pt's wife    Nausea/Vomiting/Diarrhea: Pt has some nausea on rare occasions, pt has constipation and manages with mineral oil which is working for him.    Appetite: (poor) 1 2 3 4 5 6 7 8 9 10 (excellent): 3    24-Hour Diet Recall:  Breakfast - Total Cereal with whole milk, coffee x2  Lunch - Ensure  Dinner - Progress chicken noodle soup  Snacks - nuts w/honey  Water < 64 oz/day    Discussion: Met the pt to provide new-pt diet education. We reviewed possible side effects of his treatment and how it may impact his ability to eat and tolerate food. We discussed the importance of weight maintenance, consuming adequate calories and protein, even when appetite is low, taste changes occur, and energy is low, pt verbalized understanding. We reviewed ways to manage side effects with diet, pt verbalized understanding.    Pt stated he's lost a significant amount of weight in the past four months due to difficulty swallowing. I provided him with a food list of soft/moist/high-protein foods.    All questions and concerns were addressed and answered. I provided my contact information and encouraged him to call or schedule an appointment as " needed.    Nutrition-Related Side Effects:    []Abdominal Pain  []Constipation  [x]Diarrhea  []Electrolyte Imbalance  []Fatigue  []HTN  []Hypertriglyceridemia  []Hyponatremia  []Loss of Appetite  []Low Blood Pressure  []Metallic Taste  [x]Mouth Sores  [x]Nausea  [x]Neutropenia  []Peripheral Neuropathy  []Proteinuria  []Shortness of Breath  []Taste Changes  [x]Vomiting  []Weakness  []Weight Gain  [x]Stomach Upset    Rene Messina, MS,RD,LD-[place],CD-[place]  Registered Dietitian

## 2023-09-25 ENCOUNTER — LAB (OUTPATIENT)
Dept: LAB | Facility: HOSPITAL | Age: 73
End: 2023-09-25
Payer: MEDICARE

## 2023-09-25 DIAGNOSIS — K22.89 ESOPHAGEAL MASS: ICD-10-CM

## 2023-09-25 LAB
BASOPHILS # BLD AUTO: 0.01 10*3/MM3 (ref 0–0.2)
BASOPHILS NFR BLD AUTO: 0.2 % (ref 0–1.5)
DEPRECATED RDW RBC AUTO: 41.9 FL (ref 37–54)
EOSINOPHIL # BLD AUTO: 0.12 10*3/MM3 (ref 0–0.4)
EOSINOPHIL NFR BLD AUTO: 2.3 % (ref 0.3–6.2)
ERYTHROCYTE [DISTWIDTH] IN BLOOD BY AUTOMATED COUNT: 13.1 % (ref 12.3–15.4)
HCT VFR BLD AUTO: 34.1 % (ref 37.5–51)
HGB BLD-MCNC: 11.3 G/DL (ref 13–17.7)
LYMPHOCYTES # BLD AUTO: 1.19 10*3/MM3 (ref 0.7–3.1)
LYMPHOCYTES NFR BLD AUTO: 22.6 % (ref 19.6–45.3)
MCH RBC QN AUTO: 30.3 PG (ref 26.6–33)
MCHC RBC AUTO-ENTMCNC: 33.1 G/DL (ref 31.5–35.7)
MCV RBC AUTO: 91.4 FL (ref 79–97)
MONOCYTES # BLD AUTO: 0.3 10*3/MM3 (ref 0.1–0.9)
MONOCYTES NFR BLD AUTO: 5.7 % (ref 5–12)
NEUTROPHILS NFR BLD AUTO: 3.64 10*3/MM3 (ref 1.7–7)
NEUTROPHILS NFR BLD AUTO: 69.2 % (ref 42.7–76)
PLATELET # BLD AUTO: 241 10*3/MM3 (ref 140–450)
PMV BLD AUTO: 10 FL (ref 6–12)
RBC # BLD AUTO: 3.73 10*6/MM3 (ref 4.14–5.8)
WBC NRBC COR # BLD: 5.26 10*3/MM3 (ref 3.4–10.8)

## 2023-09-25 PROCEDURE — 85025 COMPLETE CBC W/AUTO DIFF WBC: CPT

## 2023-09-25 PROCEDURE — 36415 COLL VENOUS BLD VENIPUNCTURE: CPT

## 2023-09-29 NOTE — PROGRESS NOTES
HEMATOLOGY ONCOLOGY OUTPATIENT FOLLOW UP       Patient name: Aravind Lim  : 1950  MRN: [id number]  Primary Care Physician: Abiodun Steele MD  Referring Physician: Abiodun Steele MD  Reason For Consult: Esophageal cancer      History of Present Illness:  Patient is a 73 y.o. male with recently diagnosed esophageal adenocarcinoma.    Patient has been having difficulty swallowing for the 5 months prior to presentation this is been progressing gradually.  Patient has been an active smoker with 50-pack-year smoking history.  He denies significant alcohol intake or GERD history.  His family history is positive for malignancy and 2 sisters and one nephew but he is unsure of the type of malignancy.  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  Enlarged mesenteric lymph node    2023 -PET/CT with large distal esophageal mass extending to the digastric cardia compatible with neoplasm.  Hypermetabolic node in the right thoracic inlet compatible with metastatic disease.  Bone lesion of L4 is most compatible with metastatic disease.  Largest lung nodule was not hypermetabolic likely benign process  CARIS NGS negative for HER 2, low TMB, FGFR2 amplified, PDL1 CPS 3      23 - CT guided biopsy of L4 - metastatic poorly differentiated carcinoma. Likely from esophageal primary.    2023 cycle 1 FOLFOX Opdivo  Subjective:  Patient states that he has had some improvement in his swallowing however has had significant nausea and one episode of vomiting with his chemotherapy    Past Medical History:   Diagnosis Date    Anemia 2023    Anxiety 2022     Bilateral cataracts 06/23/2023    Colon polyp     Constipation 05/03/2022    BM daily w/ mineral oil.    COPD (chronic obstructive pulmonary disease)     COVID-19 08/16/2022    Dysphagia     Fatigue 05/03/2022    GERD (gastroesophageal reflux disease)     History of hiatal hernia     Impacted cerumen of right ear 05/03/2022    Prediabetes 05/10/2022       No past surgical history on file.      Current Outpatient Medications:     albuterol sulfate  (90 Base) MCG/ACT inhaler, Inhale 2 puffs Every 4 (Four) Hours As Needed., Disp: , Rfl:     budesonide-formoterol (SYMBICORT) 160-4.5 MCG/ACT inhaler, Inhale 2 puffs 2 (Two) Times a Day., Disp: , Rfl:     Cholecalciferol 50 MCG (2000 UT) tablet, Take 1 tablet by mouth Daily., Disp: , Rfl:     lidocaine-prilocaine (EMLA) 2.5-2.5 % cream, Apply 1 application  topically to the appropriate area as directed As Needed (apply 30-60 minutes prior to port access)., Disp: 30 g, Rfl: 2    omeprazole (priLOSEC) 40 MG capsule, Take 1 capsule by mouth Every Morning., Disp: , Rfl:     ondansetron (ZOFRAN) 8 MG tablet, Take 1 tablet by mouth 3 (Three) Times a Day As Needed for Nausea or Vomiting., Disp: 30 tablet, Rfl: 5    Sorbitol 70 % solution, TAKE 100 ML BY MOUTH as directed FOR ONE DAY (Patient not taking: Reported on 8/21/2023), Disp: , Rfl:     No Known Allergies    Family History   Problem Relation Age of Onset    Diabetes Mother     Diabetes Father     Cancer Sister         Unknown of type       Cancer-related family history includes Cancer in his sister.      Social History     Tobacco Use    Smoking status: Every Day     Packs/day: 1.00     Types: Cigarettes   Vaping Use    Vaping Use: Never used   Substance Use Topics    Alcohol use: Never    Drug use: Never     Social History     Social History Narrative    Not on file       Objective:    Vital Signs:  There were no vitals filed for this visit.    There is no height or weight on file to calculate BMI.    ECOG  (1)  Restricted in physically strenuous activity, ambulatory and able to do work of light nature    Physical Exam:   Physical Exam  Constitutional:       Appearance: Normal appearance.   HENT:      Head: Normocephalic and atraumatic.   Eyes:      Pupils: Pupils are equal, round, and reactive to light.   Cardiovascular:      Rate and Rhythm: Normal rate and regular rhythm.      Pulses: Normal pulses.      Heart sounds: No murmur heard.  Pulmonary:      Effort: Pulmonary effort is normal.      Breath sounds: Normal breath sounds.   Abdominal:      General: There is no distension.      Palpations: Abdomen is soft. There is no mass.      Tenderness: There is no abdominal tenderness.   Musculoskeletal:         General: Normal range of motion.      Cervical back: Normal range of motion and neck supple.   Skin:     General: Skin is warm.   Neurological:      General: No focal deficit present.      Mental Status: He is alert.   Psychiatric:         Mood and Affect: Mood normal.       Lab Results - Last 18 Months   Lab Units 09/25/23  0910 09/18/23  0746 08/29/23  1031   WBC 10*3/mm3 5.26 5.81 6.90   HEMOGLOBIN g/dL 11.3* 10.6* 12.2*   HEMATOCRIT % 34.1* 32.6* 37.4*   PLATELETS 10*3/mm3 241 284 311   MCV fL 91.4 91.1 91.1       Lab Results - Last 18 Months   Lab Units 09/18/23  0746 08/14/23  1418   SODIUM mmol/L 134* 131*   POTASSIUM mmol/L 4.2 4.6   CHLORIDE mmol/L 99 94*   CO2 mmol/L 25.0 26.0   BUN mg/dL 11 14   CREATININE mg/dL 0.71* 0.77   CALCIUM mg/dL 9.3 9.4   BILIRUBIN mg/dL 0.2 0.2   ALK PHOS U/L 76 69   ALT (SGPT) U/L 13 12   AST (SGOT) U/L 16 17   GLUCOSE mg/dL 86 87         Lab Results   Component Value Date    GLUCOSE 86 09/18/2023    BUN 11 09/18/2023    CREATININE 0.71 (L) 09/18/2023    BCR 15.5 09/18/2023    K 4.2 09/18/2023    CO2 25.0 09/18/2023    CALCIUM 9.3 09/18/2023    ALBUMIN 4.0 09/18/2023    AST 16 09/18/2023    ALT 13 09/18/2023       Lab Results - Last 18 Months   Lab Units 08/29/23  1031   INR  0.94    APTT seconds 28.6         No results found for: IRON, TIBC, FERRITIN    No results found for: FOLATE    No results found for: OCCULTBLD    No results found for: RETICCTPCT  No results found for: NIVQITRI06  No results found for: SPEP, UPEP  No results found for: LDH, URICACID  No results found for: LEO, RF, SEDRATE  No results found for: FIBRINOGEN, HAPTOGLOBIN  Lab Results   Component Value Date    PTT 28.6 08/29/2023    INR 0.94 08/29/2023     No results found for:   No results found for: CEA  No components found for: CA-19-9  No results found for: PSA  No results found for: SEDRATE       Assessment & Plan     Patient is a 73-year-old male with esophageal adenocarcinoma of the distal esophagus.    Esophageal adenocarcinoma  Given CT imaging there is concern for metastatic lymph node in the mesentery  PET/CT with hypermetabolic lymph node, metastasis in the lumbar spine L4  Case discussed at our multidisciplinary tumor board conference    S/p biopsy of spinal lesion with metastatic disease.   Discussed starting treatment with FOLFOX opdivo given negative HER and NGS profile though PDL1 is low CPS 3  Started FOLFOX had significant chemo-induced nausea  Continue treatment some improvement clinically    Chemotherapy induced nausea vomiting  Add Zyprexa continue Aloxi if no improvement will add emend to next cycle    Poor appetite  We will add Marinol    Thank you very much for providing the opportunity to participate in this patient’s care. Please do not hesitate to call if there are any other questions.    Follow-up in 2 weeks with next treatment  Time spent on encounter including record review, history taking, exam, discussion, counseling and documentation at: 40 minutes

## 2023-10-02 ENCOUNTER — HOSPITAL ENCOUNTER (OUTPATIENT)
Dept: ONCOLOGY | Facility: HOSPITAL | Age: 73
Discharge: HOME | End: 2023-10-02
Admitting: INTERNAL MEDICINE
Payer: MEDICARE

## 2023-10-02 ENCOUNTER — OFFICE VISIT (OUTPATIENT)
Dept: ONCOLOGY | Facility: CLINIC | Age: 73
End: 2023-10-02
Payer: MEDICARE

## 2023-10-02 ENCOUNTER — DOCUMENTATION (OUTPATIENT)
Dept: ONCOLOGY | Facility: CLINIC | Age: 73
End: 2023-10-02
Payer: MEDICARE

## 2023-10-02 VITALS
SYSTOLIC BLOOD PRESSURE: 131 MMHG | HEIGHT: 75 IN | HEART RATE: 93 BPM | OXYGEN SATURATION: 97 % | RESPIRATION RATE: 16 BRPM | WEIGHT: 139.5 LBS | DIASTOLIC BLOOD PRESSURE: 66 MMHG | TEMPERATURE: 97.8 F | BODY MASS INDEX: 17.35 KG/M2

## 2023-10-02 VITALS
RESPIRATION RATE: 16 BRPM | DIASTOLIC BLOOD PRESSURE: 66 MMHG | HEIGHT: 75 IN | TEMPERATURE: 97.8 F | SYSTOLIC BLOOD PRESSURE: 131 MMHG | HEART RATE: 93 BPM | WEIGHT: 139 LBS | OXYGEN SATURATION: 93 % | BODY MASS INDEX: 17.28 KG/M2

## 2023-10-02 DIAGNOSIS — T45.1X5A CHEMOTHERAPY INDUCED NAUSEA AND VOMITING: ICD-10-CM

## 2023-10-02 DIAGNOSIS — K22.89 ESOPHAGEAL MASS: Primary | ICD-10-CM

## 2023-10-02 DIAGNOSIS — R11.2 CHEMOTHERAPY INDUCED NAUSEA AND VOMITING: ICD-10-CM

## 2023-10-02 DIAGNOSIS — R63.0 ANOREXIA: ICD-10-CM

## 2023-10-02 LAB
ALBUMIN SERPL-MCNC: 4 G/DL (ref 3.5–5.2)
ALBUMIN/GLOB SERPL: 1.8 G/DL
ALP SERPL-CCNC: 87 U/L (ref 39–117)
ALT SERPL W P-5'-P-CCNC: 9 U/L (ref 1–41)
ANION GAP SERPL CALCULATED.3IONS-SCNC: 9 MMOL/L (ref 5–15)
AST SERPL-CCNC: 17 U/L (ref 1–40)
BASOPHILS # BLD AUTO: 0.01 10*3/MM3 (ref 0–0.2)
BASOPHILS NFR BLD AUTO: 0.2 % (ref 0–1.5)
BILIRUB SERPL-MCNC: 0.2 MG/DL (ref 0–1.2)
BUN SERPL-MCNC: 13 MG/DL (ref 8–23)
BUN/CREAT SERPL: 17.8 (ref 7–25)
CALCIUM SPEC-SCNC: 9.4 MG/DL (ref 8.6–10.5)
CHLORIDE SERPL-SCNC: 95 MMOL/L (ref 98–107)
CO2 SERPL-SCNC: 25 MMOL/L (ref 22–29)
CREAT SERPL-MCNC: 0.73 MG/DL (ref 0.76–1.27)
DEPRECATED RDW RBC AUTO: 42.1 FL (ref 37–54)
EGFRCR SERPLBLD CKD-EPI 2021: 96.7 ML/MIN/1.73
EOSINOPHIL # BLD AUTO: 0.11 10*3/MM3 (ref 0–0.4)
EOSINOPHIL NFR BLD AUTO: 2.3 % (ref 0.3–6.2)
ERYTHROCYTE [DISTWIDTH] IN BLOOD BY AUTOMATED COUNT: 13.3 % (ref 12.3–15.4)
GLOBULIN UR ELPH-MCNC: 2.2 GM/DL
GLUCOSE BLDC GLUCOMTR-MCNC: 122 MG/DL (ref 70–105)
GLUCOSE SERPL-MCNC: 122 MG/DL (ref 65–99)
HCT VFR BLD AUTO: 32.2 % (ref 37.5–51)
HGB BLD-MCNC: 10.7 G/DL (ref 13–17.7)
LYMPHOCYTES # BLD AUTO: 1.26 10*3/MM3 (ref 0.7–3.1)
LYMPHOCYTES NFR BLD AUTO: 26.7 % (ref 19.6–45.3)
MCH RBC QN AUTO: 30.1 PG (ref 26.6–33)
MCHC RBC AUTO-ENTMCNC: 33.2 G/DL (ref 31.5–35.7)
MCV RBC AUTO: 90.7 FL (ref 79–97)
MONOCYTES # BLD AUTO: 0.64 10*3/MM3 (ref 0.1–0.9)
MONOCYTES NFR BLD AUTO: 13.6 % (ref 5–12)
NEUTROPHILS NFR BLD AUTO: 2.7 10*3/MM3 (ref 1.7–7)
NEUTROPHILS NFR BLD AUTO: 57.2 % (ref 42.7–76)
PLATELET # BLD AUTO: 275 10*3/MM3 (ref 140–450)
PMV BLD AUTO: 9.3 FL (ref 6–12)
POTASSIUM SERPL-SCNC: 4.3 MMOL/L (ref 3.5–5.2)
PROT SERPL-MCNC: 6.2 G/DL (ref 6–8.5)
RBC # BLD AUTO: 3.55 10*6/MM3 (ref 4.14–5.8)
SODIUM SERPL-SCNC: 129 MMOL/L (ref 136–145)
WBC NRBC COR # BLD: 4.72 10*3/MM3 (ref 3.4–10.8)

## 2023-10-02 PROCEDURE — 96415 CHEMO IV INFUSION ADDL HR: CPT

## 2023-10-02 PROCEDURE — 82948 REAGENT STRIP/BLOOD GLUCOSE: CPT

## 2023-10-02 PROCEDURE — 25010000002 FLUOROURACIL PER 500 MG: Performed by: INTERNAL MEDICINE

## 2023-10-02 PROCEDURE — A9270 NON-COVERED ITEM OR SERVICE: HCPCS | Performed by: INTERNAL MEDICINE

## 2023-10-02 PROCEDURE — G0498 CHEMO EXTEND IV INFUS W/PUMP: HCPCS

## 2023-10-02 PROCEDURE — 25010000002 OXALIPLATIN PER 0.5 MG: Performed by: INTERNAL MEDICINE

## 2023-10-02 PROCEDURE — 80053 COMPREHEN METABOLIC PANEL: CPT | Performed by: INTERNAL MEDICINE

## 2023-10-02 PROCEDURE — 25010000002 PALONOSETRON 0.25 MG/5ML SOLUTION PREFILLED SYRINGE: Performed by: INTERNAL MEDICINE

## 2023-10-02 PROCEDURE — 25010000002 DEXAMETHASONE SODIUM PHOSPHATE 120 MG/30ML SOLUTION: Performed by: INTERNAL MEDICINE

## 2023-10-02 PROCEDURE — 96409 CHEMO IV PUSH SNGL DRUG: CPT

## 2023-10-02 PROCEDURE — 96413 CHEMO IV INFUSION 1 HR: CPT

## 2023-10-02 PROCEDURE — 63710000001 OLANZAPINE 5 MG TABLET: Performed by: INTERNAL MEDICINE

## 2023-10-02 PROCEDURE — 96375 TX/PRO/DX INJ NEW DRUG ADDON: CPT

## 2023-10-02 PROCEDURE — 25010000002 LEUCOVORIN 500 MG RECONSTITUTED SOLUTION 1 EACH VIAL: Performed by: INTERNAL MEDICINE

## 2023-10-02 PROCEDURE — 96411 CHEMO IV PUSH ADDL DRUG: CPT

## 2023-10-02 PROCEDURE — 85025 COMPLETE CBC W/AUTO DIFF WBC: CPT | Performed by: INTERNAL MEDICINE

## 2023-10-02 PROCEDURE — 96367 TX/PROPH/DG ADDL SEQ IV INF: CPT

## 2023-10-02 PROCEDURE — 96368 THER/DIAG CONCURRENT INF: CPT

## 2023-10-02 PROCEDURE — 96416 CHEMO PROLONG INFUSE W/PUMP: CPT

## 2023-10-02 PROCEDURE — 96417 CHEMO IV INFUS EACH ADDL SEQ: CPT

## 2023-10-02 PROCEDURE — 25010000002 NIVOLUMAB 240 MG/24ML SOLUTION 24 ML VIAL: Performed by: INTERNAL MEDICINE

## 2023-10-02 RX ORDER — DRONABINOL 5 MG/1
5 CAPSULE ORAL
Qty: 60 CAPSULE | Refills: 0 | Status: SHIPPED | OUTPATIENT
Start: 2023-10-02 | End: 2023-11-01

## 2023-10-02 RX ORDER — DIPHENHYDRAMINE HYDROCHLORIDE 50 MG/ML
50 INJECTION INTRAMUSCULAR; INTRAVENOUS AS NEEDED
Status: CANCELLED | OUTPATIENT
Start: 2023-10-02

## 2023-10-02 RX ORDER — PALONOSETRON 0.05 MG/ML
0.25 INJECTION, SOLUTION INTRAVENOUS ONCE
Status: COMPLETED | OUTPATIENT
Start: 2023-10-02 | End: 2023-10-02

## 2023-10-02 RX ORDER — FAMOTIDINE 10 MG/ML
20 INJECTION, SOLUTION INTRAVENOUS AS NEEDED
Status: CANCELLED | OUTPATIENT
Start: 2023-10-02

## 2023-10-02 RX ORDER — LANOLIN ALCOHOL/MO/W.PET/CERES
1000 CREAM (GRAM) TOPICAL DAILY
COMMUNITY

## 2023-10-02 RX ORDER — OLANZAPINE 5 MG/1
5 TABLET ORAL ONCE
Status: COMPLETED | OUTPATIENT
Start: 2023-10-02 | End: 2023-10-02

## 2023-10-02 RX ORDER — DEXTROSE MONOHYDRATE 50 MG/ML
250 INJECTION, SOLUTION INTRAVENOUS ONCE
Status: COMPLETED | OUTPATIENT
Start: 2023-10-02 | End: 2023-10-02

## 2023-10-02 RX ORDER — FLUOROURACIL 50 MG/ML
400 INJECTION, SOLUTION INTRAVENOUS ONCE
Status: COMPLETED | OUTPATIENT
Start: 2023-10-02 | End: 2023-10-02

## 2023-10-02 RX ORDER — OLANZAPINE 5 MG/1
5 TABLET ORAL NIGHTLY
Qty: 10 TABLET | Refills: 2 | Status: SHIPPED | OUTPATIENT
Start: 2023-10-02

## 2023-10-02 RX ADMIN — DEXTROSE MONOHYDRATE 250 ML: 50 INJECTION, SOLUTION INTRAVENOUS at 09:04

## 2023-10-02 RX ADMIN — FLUOROURACIL 760 MG: 50 INJECTION, SOLUTION INTRAVENOUS at 13:38

## 2023-10-02 RX ADMIN — PALONOSETRON 0.25 MG: 0.25 INJECTION, SOLUTION INTRAVENOUS at 10:16

## 2023-10-02 RX ADMIN — OLANZAPINE 5 MG: 5 TABLET, FILM COATED ORAL at 10:15

## 2023-10-02 RX ADMIN — LEUCOVORIN CALCIUM 760 MG: 500 INJECTION, POWDER, LYOPHILIZED, FOR SOLUTION INTRAMUSCULAR; INTRAVENOUS at 11:22

## 2023-10-02 RX ADMIN — FLUOROURACIL 4580 MG: 50 INJECTION, SOLUTION INTRAVENOUS at 13:43

## 2023-10-02 RX ADMIN — SODIUM CHLORIDE 240 MG: 9 INJECTION, SOLUTION INTRAVENOUS at 10:47

## 2023-10-02 RX ADMIN — DEXAMETHASONE SODIUM PHOSPHATE 12 MG: 4 INJECTION, SOLUTION INTRA-ARTICULAR; INTRALESIONAL; INTRAMUSCULAR; INTRAVENOUS; SOFT TISSUE at 10:19

## 2023-10-02 RX ADMIN — OXALIPLATIN 160 MG: 100 INJECTION, SOLUTION, CONCENTRATE INTRAVENOUS at 11:23

## 2023-10-02 NOTE — PATIENT INSTRUCTIONS
This cycle only had zyprex (olanzapine today) take next dose tomorrow night and another dose wed night.   Next cycle take a dose the night before treatment, night of treatment, tues night and wed night (4 doses total)

## 2023-10-02 NOTE — PROGRESS NOTES
Patient is here for C2D1 opdivo and mfolfox. Port accessed and flushed with good blood return noted. 10cc of blood wasted prior to specimen collection. Blood specimen obtained and sent to lab for processing per protocol. Dr. Benito Sent: Patient is here for C2D1 opdivo and mfolfox. Patient's complaints today are he had a lot of nausea the 1st week after chemo and then it started back last night. the zofran at home only helped a little bit. he also passed out in the shower the Friday after chemo, he stated he is constipated and the mineral oil he usually takes is not working as well but he also has no appetite and has not been eating much and drinking mainly coffee-I will send a message to rene. He also had all is upper teeth pulled 1 week ago. he only gets aloxi and dex as pre-meds. His labs are within parameters is it ok to start treatment prior to you seeing him? he is already nauseated today is there anything else he can do for that? Patient did state since he has arrived the nausea has subsided. 10/02/23  [ Day 1 ], Cycle 2  WBC: 4.72  Neutrophils Absolute: 2.70  Hemoglobin: 10.7 (L)  Hematocrit: 32.2 (L)  Platelets: 275  Glucose: 122 (H)   Dr. Benito responded: to add emend to the next treatment and to add zyprexa on today's treatment only and sent a prescription home to start zyprexa the night before treatment, night of treatment and tues and wed night for a total of 4 nights (Let's add zyprexa for 4 nights starting night prior). Dr. Benito said ok to treat prior to him seeing him for his MD appt. Dr. Benito  came to see patient. Rene came to see Patient as well. Patient tolerated treatment and was discharged with AVS.

## 2023-10-03 ENCOUNTER — TELEPHONE (OUTPATIENT)
Dept: ONCOLOGY | Facility: CLINIC | Age: 73
End: 2023-10-03
Payer: MEDICARE

## 2023-10-03 NOTE — TELEPHONE ENCOUNTER
Contacted Aravind to let him know that he could take Miralax for symptoms of constipation. I let him know that he should drink plenty of fluids and stay hydrated while using Miralax. He v/u and had no other questions.

## 2023-10-04 ENCOUNTER — TELEPHONE (OUTPATIENT)
Dept: ONCOLOGY | Facility: CLINIC | Age: 73
End: 2023-10-04
Payer: MEDICARE

## 2023-10-04 ENCOUNTER — HOSPITAL ENCOUNTER (OUTPATIENT)
Dept: ONCOLOGY | Facility: HOSPITAL | Age: 73
Discharge: HOME | End: 2023-10-04
Admitting: INTERNAL MEDICINE
Payer: MEDICARE

## 2023-10-04 VITALS
SYSTOLIC BLOOD PRESSURE: 107 MMHG | RESPIRATION RATE: 16 BRPM | DIASTOLIC BLOOD PRESSURE: 62 MMHG | OXYGEN SATURATION: 97 % | TEMPERATURE: 97.6 F | HEART RATE: 68 BPM

## 2023-10-04 DIAGNOSIS — K22.89 ESOPHAGEAL MASS: Primary | ICD-10-CM

## 2023-10-04 PROCEDURE — 25010000002 HEPARIN LOCK FLUSH PER 10 UNITS: Performed by: INTERNAL MEDICINE

## 2023-10-04 PROCEDURE — 25810000003 SODIUM CHLORIDE 0.9 % SOLUTION: Performed by: INTERNAL MEDICINE

## 2023-10-04 PROCEDURE — 96360 HYDRATION IV INFUSION INIT: CPT

## 2023-10-04 RX ORDER — SODIUM CHLORIDE 0.9 % (FLUSH) 0.9 %
20 SYRINGE (ML) INJECTION AS NEEDED
Status: DISCONTINUED | OUTPATIENT
Start: 2023-10-04 | End: 2023-10-05 | Stop reason: HOSPADM

## 2023-10-04 RX ORDER — HEPARIN SODIUM (PORCINE) LOCK FLUSH IV SOLN 100 UNIT/ML 100 UNIT/ML
500 SOLUTION INTRAVENOUS AS NEEDED
Status: DISCONTINUED | OUTPATIENT
Start: 2023-10-04 | End: 2023-10-05 | Stop reason: HOSPADM

## 2023-10-04 RX ORDER — SODIUM CHLORIDE 0.9 % (FLUSH) 0.9 %
20 SYRINGE (ML) INJECTION AS NEEDED
Status: CANCELLED | OUTPATIENT
Start: 2023-10-04

## 2023-10-04 RX ORDER — HEPARIN SODIUM (PORCINE) LOCK FLUSH IV SOLN 100 UNIT/ML 100 UNIT/ML
500 SOLUTION INTRAVENOUS AS NEEDED
Status: CANCELLED | OUTPATIENT
Start: 2023-10-04

## 2023-10-04 RX ADMIN — Medication 20 ML: at 15:37

## 2023-10-04 RX ADMIN — SODIUM CHLORIDE 1000 ML: 9 INJECTION, SOLUTION INTRAVENOUS at 14:33

## 2023-10-04 RX ADMIN — HEPARIN 500 UNITS: 100 SYRINGE at 15:37

## 2023-10-04 NOTE — PROGRESS NOTES
"                 Nutrition Assessment  Aravind GROVES Couch    Height: 6'3\"  Weight: 139  BMI: 17.4  Weight Hx:   10/02/23 63 kg (139 lb)   09/18/23 64.5 kg (142 lb 4.8 oz)   09/18/23 64.4 kg (142 lb)   09/15/23 63.7 kg (140 lb 6.4 oz)   08/29/23 65.3 kg (144 lb)   08/28/23 65.3 kg (144 lb)   08/21/23 65.4 kg (144 lb 3.2 oz)   08/14/23 66.5 kg (146 lb 9.6 oz)     IBW: 196# (70.9%)  UBW: 190# (73.1%)    Nutrition Questionnaire    Chewing Problems: Pt's top teeth were pulled on 9/25/23, causing some difficulty chewing.     Swallowing Problems:   10/4/23  Pt continues to have difficulty swallowing, and it appears to be getting worse as the pt is not able to determine which foods are easy to swallow or problematic until he is eating. Pt said there does not seem to be any specific type of consistency in which foods go down easily vs which foods don't, for example, pt was not able to swallow apple sauce or grits, but was able to tolerate broccoli and cheese soup. Pt said he's able to have a few chips at a time if he allows them to dissolve in his mouth before swallowing. Pt reports the consistency of ONS being too thick to go down. His wife is trying her best to thin out all foods she prepares for her .    9/15/23  Foods get stuck near tumor at the lower portion of his esophagus. Pt is unable to swallow bread. Pt said he can swallow other foods as long as he chews them more than thoroughly and takes only small bites. Pt said it took him close to an hour and a half to eat a pork chop.    Who does the cooking & shopping: Pt's wife    Nausea/Vomiting/Diarrhea: Pt is feeling nauseated from treatment.    24-Hour Diet Recall:  Breakfast - 2 scrambled eggs, gravy, Ensure  Lunch - nothing  Dinner - broccoli & cheese soup  **Pt noted that Ensures and similar ONS are a struggle to swallow, it takes him a long time to get one down, and they seem to make him nauseous.     Discussion: Met the pt and his wife for f/u. RN informed me " the pt is reporting no appetite and stating Ensures make him sick and he is already nauseated from treatment.     Pt mentioned his poor appetite or lack of desire for certain foods his wife has prepared, to which I informed him of the importance of eating regardless of appetite or taste of food bc he needs the calories and nutrition. I recommended he eat three meals/day and snacks regardless of appetite/desire, pt verbalized understanding and said he would do his best to comply.     I provided his wife with several recipes: Warm High Calorie Beverages, High Protein/High Calorie Beverages. I also gave them two samples of Benecalorie, 1.5 oz container with 330 calories that can be mixed into cream-based foods. If he is able to tolerate this I offered more samples, they appreciated this.      All questions and concerns were addressed and answered. I provided my contact information and encouraged them to call or schedule an appointment as needed.      Rene Messina, MS,RD,LD-[place],CD-[place]  Registered Dietitian

## 2023-10-04 NOTE — PROGRESS NOTES
Pt here for CIV DC,  pump empty with blood return noted,  pt  reports fatigue,  IVF given as ordered,  pt given samples of benecalorie.  AVS given and pt  discharged with spouse. Pt reports feels better after IVF

## 2023-10-04 NOTE — TELEPHONE ENCOUNTER
Pt's wife called to inform me her  tolerated Benecalorie and asked for more samples. I will leave the last five containers I have for her at the , she appreciate this. She said she will buy more so she is able to continue providing them for her .    All questions and concerns were addressed and answered. I provided my contact information and encouraged her to call or schedule an appointment as needed.    Rene Messina, MS,RD,LD-[place],CD-[place]  Registered Dietitian

## 2023-10-09 ENCOUNTER — HOSPITAL ENCOUNTER (OUTPATIENT)
Dept: ONCOLOGY | Facility: HOSPITAL | Age: 73
Discharge: HOME | End: 2023-10-09
Admitting: INTERNAL MEDICINE
Payer: MEDICARE

## 2023-10-09 VITALS
OXYGEN SATURATION: 94 % | BODY MASS INDEX: 18.42 KG/M2 | SYSTOLIC BLOOD PRESSURE: 98 MMHG | RESPIRATION RATE: 16 BRPM | HEIGHT: 73 IN | HEART RATE: 89 BPM | DIASTOLIC BLOOD PRESSURE: 68 MMHG | WEIGHT: 139 LBS

## 2023-10-09 DIAGNOSIS — K22.89 ESOPHAGEAL MASS: Primary | ICD-10-CM

## 2023-10-09 PROCEDURE — 25810000003 SODIUM CHLORIDE 0.9 % SOLUTION: Performed by: INTERNAL MEDICINE

## 2023-10-09 PROCEDURE — 25010000002 HEPARIN LOCK FLUSH PER 10 UNITS: Performed by: INTERNAL MEDICINE

## 2023-10-09 PROCEDURE — 96360 HYDRATION IV INFUSION INIT: CPT

## 2023-10-09 RX ORDER — SODIUM CHLORIDE 0.9 % (FLUSH) 0.9 %
20 SYRINGE (ML) INJECTION AS NEEDED
Status: DISCONTINUED | OUTPATIENT
Start: 2023-10-09 | End: 2023-10-10 | Stop reason: HOSPADM

## 2023-10-09 RX ORDER — SODIUM CHLORIDE 0.9 % (FLUSH) 0.9 %
20 SYRINGE (ML) INJECTION AS NEEDED
OUTPATIENT
Start: 2023-10-09

## 2023-10-09 RX ORDER — HEPARIN SODIUM (PORCINE) LOCK FLUSH IV SOLN 100 UNIT/ML 100 UNIT/ML
500 SOLUTION INTRAVENOUS AS NEEDED
Status: DISCONTINUED | OUTPATIENT
Start: 2023-10-09 | End: 2023-10-10 | Stop reason: HOSPADM

## 2023-10-09 RX ORDER — HEPARIN SODIUM (PORCINE) LOCK FLUSH IV SOLN 100 UNIT/ML 100 UNIT/ML
500 SOLUTION INTRAVENOUS AS NEEDED
OUTPATIENT
Start: 2023-10-09

## 2023-10-09 RX ADMIN — SODIUM CHLORIDE 1000 ML: 9 INJECTION, SOLUTION INTRAVENOUS at 14:19

## 2023-10-09 RX ADMIN — Medication 20 ML: at 15:22

## 2023-10-09 RX ADMIN — HEPARIN 500 UNITS: 100 SYRINGE at 15:22

## 2023-10-13 NOTE — PROGRESS NOTES
HEMATOLOGY ONCOLOGY OUTPATIENT FOLLOW UP       Patient name: Aravind Lim  : 1950  MRN: [id number]  Primary Care Physician: Abiodun Steele MD  Referring Physician: Abiodun Steele MD  Reason For Consult: Esophageal cancer      History of Present Illness:  Patient is a 73 y.o. male with recently diagnosed esophageal adenocarcinoma.    Patient has been having difficulty swallowing for the 5 months prior to presentation this is been progressing gradually.  Patient has been an active smoker with 50-pack-year smoking history.  He denies significant alcohol intake or GERD history.  His family history is positive for malignancy and 2 sisters and one nephew but he is unsure of the type of malignancy.  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  Enlarged mesenteric lymph node    2023 -PET/CT with large distal esophageal mass extending to the digastric cardia compatible with neoplasm.  Hypermetabolic node in the right thoracic inlet compatible with metastatic disease.  Bone lesion of L4 is most compatible with metastatic disease.  Largest lung nodule was not hypermetabolic likely benign process  CARIS NGS negative for HER 2, low TMB, FGFR2 amplified, PDL1 CPS 3      23 - CT guided biopsy of L4 - metastatic poorly differentiated carcinoma. Likely from esophageal primary.    2023 cycle 1 FOLFOX Opdivo  10/16/2023 - C 3 FOLFOX Opdivo.  Subjective:  Now significant improvement in swallowing     Past Medical History:   Diagnosis Date    Anemia 2023    Anxiety 2022    Bilateral cataracts 2023    Colon polyp     Constipation 2022    BM  daily w/ mineral oil.    COPD (chronic obstructive pulmonary disease)     COVID-19 08/16/2022    Dysphagia     Fatigue 05/03/2022    GERD (gastroesophageal reflux disease)     History of hiatal hernia     Impacted cerumen of right ear 05/03/2022    Prediabetes 05/10/2022       No past surgical history on file.      Current Outpatient Medications:     albuterol sulfate  (90 Base) MCG/ACT inhaler, Inhale 2 puffs Every 4 (Four) Hours As Needed., Disp: , Rfl:     budesonide-formoterol (SYMBICORT) 160-4.5 MCG/ACT inhaler, Inhale 2 puffs 2 (Two) Times a Day., Disp: , Rfl:     dronabinol (Marinol) 5 MG capsule, Take 1 capsule by mouth 2 (Two) Times a Day Before Meals for 30 days., Disp: 60 capsule, Rfl: 0    lidocaine-prilocaine (EMLA) 2.5-2.5 % cream, Apply 1 application  topically to the appropriate area as directed As Needed (apply 30-60 minutes prior to port access)., Disp: 30 g, Rfl: 2    OLANZapine (zyPREXA) 5 MG tablet, Take 1 tablet by mouth Every Night. Take during the week of chemotherapy, Disp: 10 tablet, Rfl: 2    ondansetron (ZOFRAN) 8 MG tablet, Take 1 tablet by mouth 3 (Three) Times a Day As Needed for Nausea or Vomiting., Disp: 30 tablet, Rfl: 5    vitamin B-12 (CYANOCOBALAMIN) 1000 MCG tablet, Take 1 tablet by mouth Daily., Disp: , Rfl:     Cholecalciferol 50 MCG (2000 UT) tablet, Take 1 tablet by mouth Daily. (Patient not taking: Reported on 10/2/2023), Disp: , Rfl:     omeprazole (priLOSEC) 40 MG capsule, Take 1 capsule by mouth Every Morning. (Patient not taking: Reported on 10/2/2023), Disp: , Rfl:     Sorbitol 70 % solution, TAKE 100 ML BY MOUTH as directed FOR ONE DAY (Patient not taking: Reported on 8/21/2023), Disp: , Rfl:   No current facility-administered medications for this visit.    Facility-Administered Medications Ordered in Other Visits:     dexAMETHasone (DECADRON) IVPB 12 mg, 12 mg, Intravenous, Once, Sophie Benito MD    fluorouracil (ADRUCIL) 4,580 mg in sodium chloride 0.9 % 240  "mL chemo infusion - FOR HOME USE, 2,400 mg/m2 (Treatment Plan Recorded), Intravenous, Once, Sophie Benito MD    fluorouracil (ADRUCIL) chemo injection 760 mg, 400 mg/m2 (Treatment Plan Recorded), Intravenous, Once, Sophie Benito MD    FOSAPREPITANT 150 MG/100ML NORMAL SALINE (CBC) IVPB 100 mL 100 mL, 150 mg, Intravenous, Once, Sophie Benito MD, Last Rate: 200 mL/hr at 10/16/23 0954, 100 mL at 10/16/23 0954    leucovorin 750 mg in dextrose (D5W) 5 % 287.5 mL IVPB, 750 mg, Intravenous, Once, Sophie Benito MD    OXALIplatin (ELOXATIN) 160 mg in dextrose (D5W) 5 % 282 mL chemo IVPB, 85 mg/m2 (Treatment Plan Recorded), Intravenous, Once, Sophie Benito MD    No Known Allergies    Family History   Problem Relation Age of Onset    Diabetes Mother     Diabetes Father     Cancer Sister         Unknown of type       Cancer-related family history includes Cancer in his sister.      Social History     Tobacco Use    Smoking status: Every Day     Packs/day: 1     Types: Cigarettes   Vaping Use    Vaping Use: Never used   Substance Use Topics    Alcohol use: Never    Drug use: Never     Social History     Social History Narrative    Not on file       Objective:    Vital Signs:  Vitals:    10/16/23 0836   BP: 130/74   Pulse: 93   Resp: 16   Temp: 97.4 °F (36.3 °C)   SpO2: 96%   Weight: 62.1 kg (137 lb)   Height: 185.4 cm (73\")   PainSc: 0-No pain       Body mass index is 18.07 kg/m².    ECOG  (1) Restricted in physically strenuous activity, ambulatory and able to do work of light nature    Physical Exam:   Physical Exam  Constitutional:       Appearance: Normal appearance.   HENT:      Head: Normocephalic and atraumatic.   Eyes:      Pupils: Pupils are equal, round, and reactive to light.   Cardiovascular:      Rate and Rhythm: Normal rate and regular rhythm.      Pulses: Normal pulses.      Heart sounds: No murmur heard.  Pulmonary:      Effort: Pulmonary effort is normal.      Breath sounds: Normal breath sounds.   Abdominal:     " " General: There is no distension.      Palpations: Abdomen is soft. There is no mass.      Tenderness: There is no abdominal tenderness.   Musculoskeletal:         General: Normal range of motion.      Cervical back: Normal range of motion and neck supple.   Skin:     General: Skin is warm.   Neurological:      General: No focal deficit present.      Mental Status: He is alert.   Psychiatric:         Mood and Affect: Mood normal.         Lab Results - Last 18 Months   Lab Units 10/16/23  0833 10/02/23  0826 09/25/23  0910   WBC 10*3/mm3 4.42 4.72 5.26   HEMOGLOBIN g/dL 11.4* 10.7* 11.3*   HEMATOCRIT % 34.4* 32.2* 34.1*   PLATELETS 10*3/mm3 209 275 241   MCV fL 88.0 90.7 91.4     Lab Results - Last 18 Months   Lab Units 10/02/23  0826 09/18/23  0746 08/14/23  1418   SODIUM mmol/L 129* 134* 131*   POTASSIUM mmol/L 4.3 4.2 4.6   CHLORIDE mmol/L 95* 99 94*   CO2 mmol/L 25.0 25.0 26.0   BUN mg/dL 13 11 14   CREATININE mg/dL 0.73* 0.71* 0.77   CALCIUM mg/dL 9.4 9.3 9.4   BILIRUBIN mg/dL 0.2 0.2 0.2   ALK PHOS U/L 87 76 69   ALT (SGPT) U/L 9 13 12   AST (SGOT) U/L 17 16 17   GLUCOSE mg/dL 122* 86 87       Lab Results   Component Value Date    GLUCOSE 122 (H) 10/02/2023    BUN 13 10/02/2023    CREATININE 0.73 (L) 10/02/2023    BCR 17.8 10/02/2023    K 4.3 10/02/2023    CO2 25.0 10/02/2023    CALCIUM 9.4 10/02/2023    ALBUMIN 4.0 10/02/2023    AST 17 10/02/2023    ALT 9 10/02/2023       Lab Results - Last 18 Months   Lab Units 08/29/23  1031   INR  0.94   APTT seconds 28.6       No results found for: \"IRON\", \"TIBC\", \"FERRITIN\"    No results found for: \"FOLATE\"    No results found for: \"OCCULTBLD\"    No results found for: \"RETICCTPCT\"  No results found for: \"QEHPAMIP76\"  No results found for: \"SPEP\", \"UPEP\"  No results found for: \"LDH\", \"URICACID\"  No results found for: \"LEO\", \"RF\", \"SEDRATE\"  No results found for: \"FIBRINOGEN\", \"HAPTOGLOBIN\"  Lab Results   Component Value Date    PTT 28.6 08/29/2023    INR 0.94 08/29/2023 " "    No results found for: \"\"  No results found for: \"CEA\"  No components found for: \"CA-19-9\"  No results found for: \"PSA\"  No results found for: \"SEDRATE\"       Assessment & Plan     Patient is a 73-year-old male with esophageal adenocarcinoma of the distal esophagus.    Esophageal adenocarcinoma  Given CT imaging there is concern for metastatic lymph node in the mesentery  PET/CT with hypermetabolic lymph node, metastasis in the lumbar spine L4  Case discussed at our multidisciplinary tumor board conference    S/p biopsy of spinal lesion with metastatic disease.   Discussed starting treatment with FOLFOX opdivo given negative HER and NGS profile though PDL1 is low CPS 3  Started FOLFOX had significant chemo-induced nausea  Now improved added emend to this cycle  Stop zyprexa    Chemotherapy induced nausea vomiting  Added emend , hold zyprexa.     Poor appetite  Started marinol but it caused side effects  Will stop.     Thank you very much for providing the opportunity to participate in this patient’s care. Please do not hesitate to call if there are any other questions.    Follow-up in 2 weeks with next treatment        "

## 2023-10-16 ENCOUNTER — HOSPITAL ENCOUNTER (OUTPATIENT)
Dept: ONCOLOGY | Facility: HOSPITAL | Age: 73
Discharge: HOME | End: 2023-10-16
Admitting: INTERNAL MEDICINE
Payer: MEDICARE

## 2023-10-16 ENCOUNTER — OFFICE VISIT (OUTPATIENT)
Dept: ONCOLOGY | Facility: CLINIC | Age: 73
End: 2023-10-16
Payer: MEDICARE

## 2023-10-16 VITALS
HEIGHT: 73 IN | OXYGEN SATURATION: 96 % | SYSTOLIC BLOOD PRESSURE: 130 MMHG | BODY MASS INDEX: 18.16 KG/M2 | RESPIRATION RATE: 16 BRPM | TEMPERATURE: 97.4 F | DIASTOLIC BLOOD PRESSURE: 74 MMHG | HEART RATE: 93 BPM | WEIGHT: 137 LBS

## 2023-10-16 VITALS
BODY MASS INDEX: 18.16 KG/M2 | WEIGHT: 137 LBS | SYSTOLIC BLOOD PRESSURE: 130 MMHG | HEART RATE: 93 BPM | HEIGHT: 73 IN | DIASTOLIC BLOOD PRESSURE: 74 MMHG | OXYGEN SATURATION: 96 % | RESPIRATION RATE: 16 BRPM | TEMPERATURE: 97.4 F

## 2023-10-16 DIAGNOSIS — K22.89 ESOPHAGEAL MASS: Primary | ICD-10-CM

## 2023-10-16 DIAGNOSIS — R11.2 CHEMOTHERAPY INDUCED NAUSEA AND VOMITING: ICD-10-CM

## 2023-10-16 DIAGNOSIS — T45.1X5A CHEMOTHERAPY INDUCED NAUSEA AND VOMITING: ICD-10-CM

## 2023-10-16 LAB
ALBUMIN SERPL-MCNC: 4 G/DL (ref 3.5–5.2)
ALBUMIN/GLOB SERPL: 1.7 G/DL
ALP SERPL-CCNC: 95 U/L (ref 39–117)
ALT SERPL W P-5'-P-CCNC: 14 U/L (ref 1–41)
ANION GAP SERPL CALCULATED.3IONS-SCNC: 10 MMOL/L (ref 5–15)
AST SERPL-CCNC: 22 U/L (ref 1–40)
BASOPHILS # BLD AUTO: 0.01 10*3/MM3 (ref 0–0.2)
BASOPHILS NFR BLD AUTO: 0.2 % (ref 0–1.5)
BILIRUB SERPL-MCNC: 0.2 MG/DL (ref 0–1.2)
BUN SERPL-MCNC: 16 MG/DL (ref 8–23)
BUN/CREAT SERPL: 20.8 (ref 7–25)
CALCIUM SPEC-SCNC: 9.6 MG/DL (ref 8.6–10.5)
CHLORIDE SERPL-SCNC: 96 MMOL/L (ref 98–107)
CO2 SERPL-SCNC: 25 MMOL/L (ref 22–29)
CREAT SERPL-MCNC: 0.77 MG/DL (ref 0.76–1.27)
DEPRECATED RDW RBC AUTO: 41.2 FL (ref 37–54)
EGFRCR SERPLBLD CKD-EPI 2021: 95.1 ML/MIN/1.73
EOSINOPHIL # BLD AUTO: 0.06 10*3/MM3 (ref 0–0.4)
EOSINOPHIL NFR BLD AUTO: 1.4 % (ref 0.3–6.2)
ERYTHROCYTE [DISTWIDTH] IN BLOOD BY AUTOMATED COUNT: 13.4 % (ref 12.3–15.4)
GLOBULIN UR ELPH-MCNC: 2.4 GM/DL
GLUCOSE BLDC GLUCOMTR-MCNC: 101 MG/DL (ref 70–105)
GLUCOSE SERPL-MCNC: 89 MG/DL (ref 65–99)
HCT VFR BLD AUTO: 34.4 % (ref 37.5–51)
HGB BLD-MCNC: 11.4 G/DL (ref 13–17.7)
LYMPHOCYTES # BLD AUTO: 1.34 10*3/MM3 (ref 0.7–3.1)
LYMPHOCYTES NFR BLD AUTO: 30.3 % (ref 19.6–45.3)
MCH RBC QN AUTO: 29.2 PG (ref 26.6–33)
MCHC RBC AUTO-ENTMCNC: 33.1 G/DL (ref 31.5–35.7)
MCV RBC AUTO: 88 FL (ref 79–97)
MONOCYTES # BLD AUTO: 0.73 10*3/MM3 (ref 0.1–0.9)
MONOCYTES NFR BLD AUTO: 16.5 % (ref 5–12)
NEUTROPHILS NFR BLD AUTO: 2.28 10*3/MM3 (ref 1.7–7)
NEUTROPHILS NFR BLD AUTO: 51.6 % (ref 42.7–76)
PLATELET # BLD AUTO: 209 10*3/MM3 (ref 140–450)
PMV BLD AUTO: 9.1 FL (ref 6–12)
POTASSIUM SERPL-SCNC: 4.2 MMOL/L (ref 3.5–5.2)
PROT SERPL-MCNC: 6.4 G/DL (ref 6–8.5)
RBC # BLD AUTO: 3.91 10*6/MM3 (ref 4.14–5.8)
SODIUM SERPL-SCNC: 131 MMOL/L (ref 136–145)
WBC NRBC COR # BLD: 4.42 10*3/MM3 (ref 3.4–10.8)

## 2023-10-16 PROCEDURE — 25010000002 LEUCOVORIN 200 MG RECONSTITUTED SOLUTION 200 MG VIAL: Performed by: INTERNAL MEDICINE

## 2023-10-16 PROCEDURE — 96415 CHEMO IV INFUSION ADDL HR: CPT

## 2023-10-16 PROCEDURE — 0 DEXTROSE 5 % SOLUTION 250 ML FLEX CONT: Performed by: INTERNAL MEDICINE

## 2023-10-16 PROCEDURE — 96417 CHEMO IV INFUS EACH ADDL SEQ: CPT

## 2023-10-16 PROCEDURE — 96375 TX/PRO/DX INJ NEW DRUG ADDON: CPT

## 2023-10-16 PROCEDURE — 96368 THER/DIAG CONCURRENT INF: CPT

## 2023-10-16 PROCEDURE — 96413 CHEMO IV INFUSION 1 HR: CPT

## 2023-10-16 PROCEDURE — 25810000003 SODIUM CHLORIDE 0.9 % SOLUTION 250 ML FLEX CONT: Performed by: INTERNAL MEDICINE

## 2023-10-16 PROCEDURE — 25010000002 NIVOLUMAB 240 MG/24ML SOLUTION 24 ML VIAL: Performed by: INTERNAL MEDICINE

## 2023-10-16 PROCEDURE — 25010000002 DEXAMETHASONE SODIUM PHOSPHATE 120 MG/30ML SOLUTION: Performed by: INTERNAL MEDICINE

## 2023-10-16 PROCEDURE — G0498 CHEMO EXTEND IV INFUS W/PUMP: HCPCS

## 2023-10-16 PROCEDURE — 96411 CHEMO IV PUSH ADDL DRUG: CPT

## 2023-10-16 PROCEDURE — 96409 CHEMO IV PUSH SNGL DRUG: CPT

## 2023-10-16 PROCEDURE — 80053 COMPREHEN METABOLIC PANEL: CPT | Performed by: INTERNAL MEDICINE

## 2023-10-16 PROCEDURE — 96416 CHEMO PROLONG INFUSE W/PUMP: CPT

## 2023-10-16 PROCEDURE — 0 DEXTROSE 5 % SOLUTION: Performed by: INTERNAL MEDICINE

## 2023-10-16 PROCEDURE — 25010000002 FOSAPREPITANT PER 1 MG: Performed by: INTERNAL MEDICINE

## 2023-10-16 PROCEDURE — 82948 REAGENT STRIP/BLOOD GLUCOSE: CPT

## 2023-10-16 PROCEDURE — 96367 TX/PROPH/DG ADDL SEQ IV INF: CPT

## 2023-10-16 PROCEDURE — 25010000002 LEUCOVORIN CALCIUM PER 50 MG: Performed by: INTERNAL MEDICINE

## 2023-10-16 PROCEDURE — 25010000002 OXALIPLATIN PER 0.5 MG: Performed by: INTERNAL MEDICINE

## 2023-10-16 PROCEDURE — 85025 COMPLETE CBC W/AUTO DIFF WBC: CPT | Performed by: INTERNAL MEDICINE

## 2023-10-16 PROCEDURE — 25010000002 PALONOSETRON 0.25 MG/5ML SOLUTION PREFILLED SYRINGE: Performed by: INTERNAL MEDICINE

## 2023-10-16 PROCEDURE — 25010000002 FLUOROURACIL PER 500 MG: Performed by: INTERNAL MEDICINE

## 2023-10-16 RX ORDER — DIPHENHYDRAMINE HYDROCHLORIDE 50 MG/ML
50 INJECTION INTRAMUSCULAR; INTRAVENOUS AS NEEDED
Status: CANCELLED | OUTPATIENT
Start: 2023-10-16

## 2023-10-16 RX ORDER — PALONOSETRON 0.05 MG/ML
0.25 INJECTION, SOLUTION INTRAVENOUS ONCE
Status: COMPLETED | OUTPATIENT
Start: 2023-10-16 | End: 2023-10-16

## 2023-10-16 RX ORDER — FAMOTIDINE 10 MG/ML
20 INJECTION, SOLUTION INTRAVENOUS AS NEEDED
Status: CANCELLED | OUTPATIENT
Start: 2023-10-16

## 2023-10-16 RX ORDER — DEXTROSE MONOHYDRATE 50 MG/ML
250 INJECTION, SOLUTION INTRAVENOUS ONCE
Status: COMPLETED | OUTPATIENT
Start: 2023-10-16 | End: 2023-10-16

## 2023-10-16 RX ORDER — FLUOROURACIL 50 MG/ML
400 INJECTION, SOLUTION INTRAVENOUS ONCE
Status: COMPLETED | OUTPATIENT
Start: 2023-10-16 | End: 2023-10-16

## 2023-10-16 RX ADMIN — FLUOROURACIL 760 MG: 50 INJECTION, SOLUTION INTRAVENOUS at 13:34

## 2023-10-16 RX ADMIN — PALONOSETRON 0.25 MG: 0.25 INJECTION, SOLUTION INTRAVENOUS at 09:00

## 2023-10-16 RX ADMIN — LEUCOVORIN CALCIUM 750 MG: 350 INJECTION, POWDER, LYOPHILIZED, FOR SUSPENSION INTRAMUSCULAR; INTRAVENOUS at 11:12

## 2023-10-16 RX ADMIN — DEXTROSE MONOHYDRATE 250 ML: 50 INJECTION, SOLUTION INTRAVENOUS at 09:00

## 2023-10-16 RX ADMIN — OXALIPLATIN 160 MG: 100 INJECTION, SOLUTION, CONCENTRATE INTRAVENOUS at 11:17

## 2023-10-16 RX ADMIN — SODIUM CHLORIDE 240 MG: 9 INJECTION, SOLUTION INTRAVENOUS at 09:12

## 2023-10-16 RX ADMIN — FOSAPREPITANT 100 ML: 150 INJECTION, POWDER, LYOPHILIZED, FOR SOLUTION INTRAVENOUS at 09:54

## 2023-10-16 RX ADMIN — FLUOROURACIL 4580 MG: 50 INJECTION, SOLUTION INTRAVENOUS at 13:40

## 2023-10-16 RX ADMIN — DEXAMETHASONE SODIUM PHOSPHATE 12 MG: 4 INJECTION, SOLUTION INTRA-ARTICULAR; INTRALESIONAL; INTRAMUSCULAR; INTRAVENOUS; SOFT TISSUE at 10:44

## 2023-10-16 NOTE — PROGRESS NOTES
Patient is here for C3D1 opdivo and mfolfox. Port accessed by Loren ACOSTA and flushed with good blood return noted. 10cc of blood wasted prior to specimen collection. Blood specimen obtained and sent to lab for processing per protocol.     Dr. Benito sent: Patient is here for C3D1 opdivo and mfolfox. patients stated that he took the zyprexa last night and had trouble breathing-he is fine this morning, he also had trouble with the marinol-made him feel drunk, happy and giddy then all the sudden got dizzy and sick. he did take it with the zyprexa. he stated he is eating and drinking fine now even with not taking the marinol anymore. he lost 2 pounds since last visit. He is starting the emend with this cycle-I told him if the zyprexa is causing him to have trouble breathing to not take it anymore. When he blows his nose he is having a small amount of blood come out-he stated it feels very dry-told him to use a humidifier. You are seeing him today and his labs are within parameters-is it ok to start treatment before you see him today?    10/16/23  Day 1, Cycle 3  WBC: 4.42  Neutrophils Absolute: 2.28  Hemoglobin: 11.4 (L)  Hematocrit: 34.4 (L)  Platelets: 209  Glucose: 101     Dr. Benito replied: Yes ok to treat.    Dr. Benito sent: are you still wanting weekly fluids on him. he stated he is feeling a lot better and drinking better and does not think he needs them. they were not scheduled weekly so was double checking prior to adding them.    Dr. Benito replied: no we can stop that    Dr. Benito sent after the CMP was back: his cmp is back and his sodium was 131 just wanted to let you know    Dr. Benito replied: we should give him NS with his pupm off. 1 lit NS-ye added it on the plan and patient verbalized understanding. Patient tolerated treatment and was discharged with AVS.

## 2023-10-18 ENCOUNTER — HOSPITAL ENCOUNTER (OUTPATIENT)
Dept: ONCOLOGY | Facility: HOSPITAL | Age: 73
Discharge: HOME | End: 2023-10-18
Admitting: INTERNAL MEDICINE
Payer: MEDICARE

## 2023-10-18 VITALS
OXYGEN SATURATION: 98 % | RESPIRATION RATE: 18 BRPM | HEART RATE: 78 BPM | BODY MASS INDEX: 18.76 KG/M2 | SYSTOLIC BLOOD PRESSURE: 132 MMHG | DIASTOLIC BLOOD PRESSURE: 69 MMHG | WEIGHT: 142.2 LBS | TEMPERATURE: 98.2 F

## 2023-10-18 DIAGNOSIS — K22.89 ESOPHAGEAL MASS: Primary | ICD-10-CM

## 2023-10-18 PROCEDURE — 96360 HYDRATION IV INFUSION INIT: CPT

## 2023-10-18 PROCEDURE — 25810000003 SODIUM CHLORIDE 0.9 % SOLUTION: Performed by: INTERNAL MEDICINE

## 2023-10-18 PROCEDURE — 25010000002 HEPARIN LOCK FLUSH PER 10 UNITS: Performed by: INTERNAL MEDICINE

## 2023-10-18 RX ORDER — HEPARIN SODIUM (PORCINE) LOCK FLUSH IV SOLN 100 UNIT/ML 100 UNIT/ML
500 SOLUTION INTRAVENOUS AS NEEDED
Status: DISCONTINUED | OUTPATIENT
Start: 2023-10-18 | End: 2023-10-19 | Stop reason: HOSPADM

## 2023-10-18 RX ORDER — SODIUM CHLORIDE 0.9 % (FLUSH) 0.9 %
20 SYRINGE (ML) INJECTION AS NEEDED
Status: DISCONTINUED | OUTPATIENT
Start: 2023-10-18 | End: 2023-10-19 | Stop reason: HOSPADM

## 2023-10-18 RX ORDER — HEPARIN SODIUM (PORCINE) LOCK FLUSH IV SOLN 100 UNIT/ML 100 UNIT/ML
500 SOLUTION INTRAVENOUS AS NEEDED
OUTPATIENT
Start: 2023-10-18

## 2023-10-18 RX ORDER — SODIUM CHLORIDE 0.9 % (FLUSH) 0.9 %
20 SYRINGE (ML) INJECTION AS NEEDED
OUTPATIENT
Start: 2023-10-18

## 2023-10-18 RX ADMIN — HEPARIN 500 UNITS: 100 SYRINGE at 13:34

## 2023-10-18 RX ADMIN — SODIUM CHLORIDE 1000 ML: 900 INJECTION, SOLUTION INTRAVENOUS at 12:25

## 2023-10-18 RX ADMIN — Medication 20 ML: at 13:34

## 2023-10-18 NOTE — ADDENDUM NOTE
Encounter addended by: Gladys Peralta RN on: 10/18/2023 3:36 PM   Actions taken: Flowsheet accepted, Therapy plan modified

## 2023-10-20 ENCOUNTER — TELEPHONE (OUTPATIENT)
Dept: ONCOLOGY | Facility: CLINIC | Age: 73
End: 2023-10-20
Payer: MEDICARE

## 2023-10-20 NOTE — TELEPHONE ENCOUNTER
Spoke with Aravind and let him know that he can take Pepto for diarrhea, but Imodium would be better for his symptoms. He states that he will go to the pharmacy to  imodium if his symptoms do no resolve with Pepto.

## 2023-10-20 NOTE — TELEPHONE ENCOUNTER
Provider: CHARY    Caller: PAUL CEDILLO    Relationship to Patient: SELF    Pharmacy: [facility]    Phone Number: [phone]    Reason for Call: WOULD LIKE TO KNOW IF HE CAN TAKE PEPTO BISMOL FOR DIARRHEA    When was the patient last seen: 10/18/2023

## 2023-10-27 NOTE — PROGRESS NOTES
HEMATOLOGY ONCOLOGY OUTPATIENT FOLLOW UP       Patient name: Aravind Lim  : 1950  MRN: [id number]  Primary Care Physician: Abiodun Steele MD  Referring Physician: Abiodun Steele MD  Reason For Consult: Esophageal cancer      History of Present Illness:  Patient is a 73 y.o. male with recently diagnosed esophageal adenocarcinoma.    Patient has been having difficulty swallowing for the 5 months prior to presentation this is been progressing gradually.  Patient has been an active smoker with 50-pack-year smoking history.  He denies significant alcohol intake or GERD history.  His family history is positive for malignancy and 2 sisters and one nephew but he is unsure of the type of malignancy.  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  Enlarged mesenteric lymph node    2023 -PET/CT with large distal esophageal mass extending to the digastric cardia compatible with neoplasm.  Hypermetabolic node in the right thoracic inlet compatible with metastatic disease.  Bone lesion of L4 is most compatible with metastatic disease.  Largest lung nodule was not hypermetabolic likely benign process  CARIS NGS negative for HER 2, low TMB, FGFR2 amplified, PDL1 CPS 3      23 - CT guided biopsy of L4 - metastatic poorly differentiated carcinoma. Likely from esophageal primary.    2023 cycle 1 FOLFOX Opdivo  10/16/2023 - C 3 FOLFOX Opdivo.  10/30/2023 - C4 FOLFOX opdivo    Subjective:  Normal swallowing, weight is improving.    Past Medical History:   Diagnosis Date    Anemia 2023    Anxiety 2022    Bilateral cataracts 2023    Colon polyp      Constipation 05/03/2022    BM daily w/ mineral oil.    COPD (chronic obstructive pulmonary disease)     COVID-19 08/16/2022    Dysphagia     Fatigue 05/03/2022    GERD (gastroesophageal reflux disease)     History of hiatal hernia     Impacted cerumen of right ear 05/03/2022    Prediabetes 05/10/2022       No past surgical history on file.      Current Outpatient Medications:     albuterol sulfate  (90 Base) MCG/ACT inhaler, Inhale 2 puffs Every 4 (Four) Hours As Needed., Disp: , Rfl:     budesonide-formoterol (SYMBICORT) 160-4.5 MCG/ACT inhaler, Inhale 2 puffs 2 (Two) Times a Day., Disp: , Rfl:     Cholecalciferol 50 MCG (2000 UT) tablet, Take 1 tablet by mouth Daily. (Patient not taking: Reported on 10/2/2023), Disp: , Rfl:     lidocaine-prilocaine (EMLA) 2.5-2.5 % cream, Apply 1 application  topically to the appropriate area as directed As Needed (apply 30-60 minutes prior to port access)., Disp: 30 g, Rfl: 2    omeprazole (priLOSEC) 40 MG capsule, Take 1 capsule by mouth Every Morning. (Patient not taking: Reported on 10/2/2023), Disp: , Rfl:     ondansetron (ZOFRAN) 8 MG tablet, Take 1 tablet by mouth 3 (Three) Times a Day As Needed for Nausea or Vomiting., Disp: 30 tablet, Rfl: 5    Sorbitol 70 % solution, TAKE 100 ML BY MOUTH as directed FOR ONE DAY (Patient not taking: Reported on 8/21/2023), Disp: , Rfl:     vitamin B-12 (CYANOCOBALAMIN) 1000 MCG tablet, Take 1 tablet by mouth Daily., Disp: , Rfl:     No Known Allergies    Family History   Problem Relation Age of Onset    Diabetes Mother     Diabetes Father     Cancer Sister         Unknown of type       Cancer-related family history includes Cancer in his sister.      Social History     Tobacco Use    Smoking status: Every Day     Packs/day: 1     Types: Cigarettes   Vaping Use    Vaping Use: Never used   Substance Use Topics    Alcohol use: Never    Drug use: Never     Social History     Social History Narrative    Not on file        Objective:    Vital Signs:  There were no vitals filed for this visit.      There is no height or weight on file to calculate BMI.    ECOG  (1) Restricted in physically strenuous activity, ambulatory and able to do work of light nature    Physical Exam:   Physical Exam  Constitutional:       Appearance: Normal appearance.   HENT:      Head: Normocephalic and atraumatic.   Eyes:      Pupils: Pupils are equal, round, and reactive to light.   Cardiovascular:      Rate and Rhythm: Normal rate and regular rhythm.      Pulses: Normal pulses.      Heart sounds: No murmur heard.  Pulmonary:      Effort: Pulmonary effort is normal.      Breath sounds: Normal breath sounds.   Abdominal:      General: There is no distension.      Palpations: Abdomen is soft. There is no mass.      Tenderness: There is no abdominal tenderness.   Musculoskeletal:         General: Normal range of motion.      Cervical back: Normal range of motion and neck supple.   Skin:     General: Skin is warm.   Neurological:      General: No focal deficit present.      Mental Status: He is alert.   Psychiatric:         Mood and Affect: Mood normal.       Lab Results - Last 18 Months   Lab Units 10/16/23  0833 10/02/23  0826 09/25/23  0910   WBC 10*3/mm3 4.42 4.72 5.26   HEMOGLOBIN g/dL 11.4* 10.7* 11.3*   HEMATOCRIT % 34.4* 32.2* 34.1*   PLATELETS 10*3/mm3 209 275 241   MCV fL 88.0 90.7 91.4     Lab Results - Last 18 Months   Lab Units 10/16/23  0833 10/02/23  0826 09/18/23  0746   SODIUM mmol/L 131* 129* 134*   POTASSIUM mmol/L 4.2 4.3 4.2   CHLORIDE mmol/L 96* 95* 99   CO2 mmol/L 25.0 25.0 25.0   BUN mg/dL 16 13 11   CREATININE mg/dL 0.77 0.73* 0.71*   CALCIUM mg/dL 9.6 9.4 9.3   BILIRUBIN mg/dL 0.2 0.2 0.2   ALK PHOS U/L 95 87 76   ALT (SGPT) U/L 14 9 13   AST (SGOT) U/L 22 17 16   GLUCOSE mg/dL 89 122* 86       Lab Results   Component Value Date    GLUCOSE 89 10/16/2023    BUN 16 10/16/2023    CREATININE 0.77 10/16/2023    BCR 20.8 10/16/2023    K  "4.2 10/16/2023    CO2 25.0 10/16/2023    CALCIUM 9.6 10/16/2023    ALBUMIN 4.0 10/16/2023    AST 22 10/16/2023    ALT 14 10/16/2023       Lab Results - Last 18 Months   Lab Units 08/29/23  1031   INR  0.94   APTT seconds 28.6       No results found for: \"IRON\", \"TIBC\", \"FERRITIN\"    No results found for: \"FOLATE\"    No results found for: \"OCCULTBLD\"    No results found for: \"RETICCTPCT\"  No results found for: \"WADFTLKY76\"  No results found for: \"SPEP\", \"UPEP\"  No results found for: \"LDH\", \"URICACID\"  No results found for: \"LEO\", \"RF\", \"SEDRATE\"  No results found for: \"FIBRINOGEN\", \"HAPTOGLOBIN\"  Lab Results   Component Value Date    PTT 28.6 08/29/2023    INR 0.94 08/29/2023     No results found for: \"\"  No results found for: \"CEA\"  No components found for: \"CA-19-9\"  No results found for: \"PSA\"  No results found for: \"SEDRATE\"       Assessment & Plan     Patient is a 73-year-old male with esophageal adenocarcinoma of the distal esophagus.    Esophageal adenocarcinoma  Given CT imaging there is concern for metastatic lymph node in the mesentery  PET/CT with hypermetabolic lymph node, metastasis in the lumbar spine L4  Case discussed at our multidisciplinary tumor board conference    S/p biopsy of spinal lesion with metastatic disease.   Discussed starting treatment with FOLFOX opdivo given negative HER and NGS profile though PDL1 is low CPS 3  Started FOLFOX had significant chemo-induced nausea  Now improved added emend to this cycle  Stop zyprexa  Continue treatment clinical improvement.     Chemotherapy induced nausea vomiting  Added emend , hold zyprexa.     Poor appetite  Started marinol but it caused side effects  Now appetite is improved, no marinol    Thank you very much for providing the opportunity to participate in this patient’s care. Please do not hesitate to call if there are any other questions.    Follow-up in 2 weeks with next treatment        "

## 2023-10-30 ENCOUNTER — OFFICE VISIT (OUTPATIENT)
Dept: ONCOLOGY | Facility: CLINIC | Age: 73
End: 2023-10-30
Payer: MEDICARE

## 2023-10-30 ENCOUNTER — HOSPITAL ENCOUNTER (OUTPATIENT)
Dept: ONCOLOGY | Facility: HOSPITAL | Age: 73
Discharge: HOME | End: 2023-10-30
Admitting: INTERNAL MEDICINE
Payer: MEDICARE

## 2023-10-30 VITALS
HEART RATE: 80 BPM | WEIGHT: 140 LBS | SYSTOLIC BLOOD PRESSURE: 124 MMHG | DIASTOLIC BLOOD PRESSURE: 56 MMHG | HEIGHT: 73 IN | RESPIRATION RATE: 16 BRPM | OXYGEN SATURATION: 97 % | BODY MASS INDEX: 18.55 KG/M2 | TEMPERATURE: 97.7 F

## 2023-10-30 VITALS
SYSTOLIC BLOOD PRESSURE: 124 MMHG | WEIGHT: 140 LBS | DIASTOLIC BLOOD PRESSURE: 56 MMHG | HEART RATE: 80 BPM | BODY MASS INDEX: 18.55 KG/M2 | OXYGEN SATURATION: 97 % | TEMPERATURE: 97.7 F | RESPIRATION RATE: 16 BRPM | HEIGHT: 73 IN

## 2023-10-30 DIAGNOSIS — K22.89 ESOPHAGEAL MASS: Primary | ICD-10-CM

## 2023-10-30 DIAGNOSIS — C79.51 METASTATIC CANCER TO SPINE: ICD-10-CM

## 2023-10-30 DIAGNOSIS — R11.2 CHEMOTHERAPY INDUCED NAUSEA AND VOMITING: ICD-10-CM

## 2023-10-30 DIAGNOSIS — T45.1X5A CHEMOTHERAPY INDUCED NAUSEA AND VOMITING: ICD-10-CM

## 2023-10-30 LAB
ALBUMIN SERPL-MCNC: 3.8 G/DL (ref 3.5–5.2)
ALBUMIN/GLOB SERPL: 1.4 G/DL
ALP SERPL-CCNC: 90 U/L (ref 39–117)
ALT SERPL W P-5'-P-CCNC: 16 U/L (ref 1–41)
ANION GAP SERPL CALCULATED.3IONS-SCNC: 11 MMOL/L (ref 5–15)
AST SERPL-CCNC: 21 U/L (ref 1–40)
BASOPHILS # BLD AUTO: 0.01 10*3/MM3 (ref 0–0.2)
BASOPHILS NFR BLD AUTO: 0.3 % (ref 0–1.5)
BILIRUB SERPL-MCNC: 0.2 MG/DL (ref 0–1.2)
BUN SERPL-MCNC: 13 MG/DL (ref 8–23)
BUN/CREAT SERPL: 20.6 (ref 7–25)
CALCIUM SPEC-SCNC: 9.2 MG/DL (ref 8.6–10.5)
CHLORIDE SERPL-SCNC: 96 MMOL/L (ref 98–107)
CO2 SERPL-SCNC: 23 MMOL/L (ref 22–29)
CREAT SERPL-MCNC: 0.63 MG/DL (ref 0.76–1.27)
DEPRECATED RDW RBC AUTO: 41.9 FL (ref 37–54)
EGFRCR SERPLBLD CKD-EPI 2021: 101.1 ML/MIN/1.73
EOSINOPHIL # BLD AUTO: 0.05 10*3/MM3 (ref 0–0.4)
EOSINOPHIL NFR BLD AUTO: 1.3 % (ref 0.3–6.2)
ERYTHROCYTE [DISTWIDTH] IN BLOOD BY AUTOMATED COUNT: 13.9 % (ref 12.3–15.4)
GLOBULIN UR ELPH-MCNC: 2.7 GM/DL
GLUCOSE BLDC GLUCOMTR-MCNC: 114 MG/DL (ref 70–105)
GLUCOSE SERPL-MCNC: 112 MG/DL (ref 65–99)
HCT VFR BLD AUTO: 33.7 % (ref 37.5–51)
HGB BLD-MCNC: 10.9 G/DL (ref 13–17.7)
LYMPHOCYTES # BLD AUTO: 1.08 10*3/MM3 (ref 0.7–3.1)
LYMPHOCYTES NFR BLD AUTO: 27.3 % (ref 19.6–45.3)
MCH RBC QN AUTO: 28.6 PG (ref 26.6–33)
MCHC RBC AUTO-ENTMCNC: 32.3 G/DL (ref 31.5–35.7)
MCV RBC AUTO: 88.5 FL (ref 79–97)
MONOCYTES # BLD AUTO: 0.61 10*3/MM3 (ref 0.1–0.9)
MONOCYTES NFR BLD AUTO: 15.4 % (ref 5–12)
NEUTROPHILS NFR BLD AUTO: 2.21 10*3/MM3 (ref 1.7–7)
NEUTROPHILS NFR BLD AUTO: 55.7 % (ref 42.7–76)
PLATELET # BLD AUTO: 147 10*3/MM3 (ref 140–450)
PMV BLD AUTO: 9.5 FL (ref 6–12)
POTASSIUM SERPL-SCNC: 4.5 MMOL/L (ref 3.5–5.2)
PROT SERPL-MCNC: 6.5 G/DL (ref 6–8.5)
RBC # BLD AUTO: 3.81 10*6/MM3 (ref 4.14–5.8)
SODIUM SERPL-SCNC: 130 MMOL/L (ref 136–145)
T4 FREE SERPL-MCNC: 1.34 NG/DL (ref 0.93–1.7)
TSH SERPL DL<=0.05 MIU/L-ACNC: 6.08 UIU/ML (ref 0.27–4.2)
WBC NRBC COR # BLD: 3.96 10*3/MM3 (ref 3.4–10.8)

## 2023-10-30 PROCEDURE — 96367 TX/PROPH/DG ADDL SEQ IV INF: CPT

## 2023-10-30 PROCEDURE — 1160F RVW MEDS BY RX/DR IN RCRD: CPT | Performed by: INTERNAL MEDICINE

## 2023-10-30 PROCEDURE — 0 DEXTROSE 5 % SOLUTION: Performed by: INTERNAL MEDICINE

## 2023-10-30 PROCEDURE — 25010000002 FLUOROURACIL PER 500 MG: Performed by: INTERNAL MEDICINE

## 2023-10-30 PROCEDURE — 96417 CHEMO IV INFUS EACH ADDL SEQ: CPT

## 2023-10-30 PROCEDURE — 96413 CHEMO IV INFUSION 1 HR: CPT

## 2023-10-30 PROCEDURE — 25010000002 FOSAPREPITANT PER 1 MG: Performed by: INTERNAL MEDICINE

## 2023-10-30 PROCEDURE — 25010000002 OXALIPLATIN PER 0.5 MG: Performed by: INTERNAL MEDICINE

## 2023-10-30 PROCEDURE — 96411 CHEMO IV PUSH ADDL DRUG: CPT

## 2023-10-30 PROCEDURE — 80053 COMPREHEN METABOLIC PANEL: CPT | Performed by: INTERNAL MEDICINE

## 2023-10-30 PROCEDURE — 82948 REAGENT STRIP/BLOOD GLUCOSE: CPT

## 2023-10-30 PROCEDURE — 25010000002 DEXAMETHASONE SODIUM PHOSPHATE 120 MG/30ML SOLUTION: Performed by: INTERNAL MEDICINE

## 2023-10-30 PROCEDURE — 0 DEXTROSE 5 % SOLUTION 250 ML FLEX CONT: Performed by: INTERNAL MEDICINE

## 2023-10-30 PROCEDURE — 84443 ASSAY THYROID STIM HORMONE: CPT | Performed by: INTERNAL MEDICINE

## 2023-10-30 PROCEDURE — 1126F AMNT PAIN NOTED NONE PRSNT: CPT | Performed by: INTERNAL MEDICINE

## 2023-10-30 PROCEDURE — 25810000003 SODIUM CHLORIDE 0.9 % SOLUTION 250 ML FLEX CONT: Performed by: INTERNAL MEDICINE

## 2023-10-30 PROCEDURE — 25010000002 PALONOSETRON 0.25 MG/5ML SOLUTION PREFILLED SYRINGE: Performed by: INTERNAL MEDICINE

## 2023-10-30 PROCEDURE — 84439 ASSAY OF FREE THYROXINE: CPT | Performed by: INTERNAL MEDICINE

## 2023-10-30 PROCEDURE — G0498 CHEMO EXTEND IV INFUS W/PUMP: HCPCS

## 2023-10-30 PROCEDURE — 96375 TX/PRO/DX INJ NEW DRUG ADDON: CPT

## 2023-10-30 PROCEDURE — 25010000002 LEUCOVORIN CALCIUM PER 50 MG: Performed by: INTERNAL MEDICINE

## 2023-10-30 PROCEDURE — 25010000002 NIVOLUMAB 240 MG/24ML SOLUTION 24 ML VIAL: Performed by: INTERNAL MEDICINE

## 2023-10-30 PROCEDURE — 96368 THER/DIAG CONCURRENT INF: CPT

## 2023-10-30 PROCEDURE — 1159F MED LIST DOCD IN RCRD: CPT | Performed by: INTERNAL MEDICINE

## 2023-10-30 PROCEDURE — 96416 CHEMO PROLONG INFUSE W/PUMP: CPT

## 2023-10-30 PROCEDURE — 85025 COMPLETE CBC W/AUTO DIFF WBC: CPT | Performed by: INTERNAL MEDICINE

## 2023-10-30 PROCEDURE — 96415 CHEMO IV INFUSION ADDL HR: CPT

## 2023-10-30 RX ORDER — FLUOROURACIL 50 MG/ML
400 INJECTION, SOLUTION INTRAVENOUS ONCE
Status: COMPLETED | OUTPATIENT
Start: 2023-10-30 | End: 2023-10-30

## 2023-10-30 RX ORDER — HEPARIN SODIUM (PORCINE) LOCK FLUSH IV SOLN 100 UNIT/ML 100 UNIT/ML
500 SOLUTION INTRAVENOUS AS NEEDED
Status: DISCONTINUED | OUTPATIENT
Start: 2023-10-30 | End: 2023-10-31 | Stop reason: HOSPADM

## 2023-10-30 RX ORDER — DIPHENHYDRAMINE HYDROCHLORIDE 50 MG/ML
50 INJECTION INTRAMUSCULAR; INTRAVENOUS AS NEEDED
Status: CANCELLED | OUTPATIENT
Start: 2023-10-30

## 2023-10-30 RX ORDER — FAMOTIDINE 10 MG/ML
20 INJECTION, SOLUTION INTRAVENOUS AS NEEDED
Status: CANCELLED | OUTPATIENT
Start: 2023-10-30

## 2023-10-30 RX ORDER — HEPARIN SODIUM (PORCINE) LOCK FLUSH IV SOLN 100 UNIT/ML 100 UNIT/ML
500 SOLUTION INTRAVENOUS AS NEEDED
Status: CANCELLED | OUTPATIENT
Start: 2023-10-30

## 2023-10-30 RX ORDER — SODIUM CHLORIDE 0.9 % (FLUSH) 0.9 %
20 SYRINGE (ML) INJECTION AS NEEDED
Status: DISCONTINUED | OUTPATIENT
Start: 2023-10-30 | End: 2023-10-31 | Stop reason: HOSPADM

## 2023-10-30 RX ORDER — SODIUM CHLORIDE 0.9 % (FLUSH) 0.9 %
20 SYRINGE (ML) INJECTION AS NEEDED
Status: CANCELLED | OUTPATIENT
Start: 2023-10-30

## 2023-10-30 RX ORDER — PALONOSETRON 0.05 MG/ML
0.25 INJECTION, SOLUTION INTRAVENOUS ONCE
Status: COMPLETED | OUTPATIENT
Start: 2023-10-30 | End: 2023-10-30

## 2023-10-30 RX ORDER — DEXTROSE MONOHYDRATE 50 MG/ML
250 INJECTION, SOLUTION INTRAVENOUS ONCE
Status: COMPLETED | OUTPATIENT
Start: 2023-10-30 | End: 2023-10-30

## 2023-10-30 RX ADMIN — LEUCOVORIN CALCIUM 760 MG: 350 INJECTION, POWDER, LYOPHILIZED, FOR SUSPENSION INTRAMUSCULAR; INTRAVENOUS at 11:58

## 2023-10-30 RX ADMIN — FLUOROURACIL 4580 MG: 50 INJECTION, SOLUTION INTRAVENOUS at 14:16

## 2023-10-30 RX ADMIN — FLUOROURACIL 760 MG: 50 INJECTION, SOLUTION INTRAVENOUS at 14:11

## 2023-10-30 RX ADMIN — DEXTROSE MONOHYDRATE 250 ML: 50 INJECTION, SOLUTION INTRAVENOUS at 09:56

## 2023-10-30 RX ADMIN — FOSAPREPITANT 100 ML: 150 INJECTION, POWDER, LYOPHILIZED, FOR SOLUTION INTRAVENOUS at 10:40

## 2023-10-30 RX ADMIN — OXALIPLATIN 160 MG: 100 INJECTION, SOLUTION, CONCENTRATE INTRAVENOUS at 12:02

## 2023-10-30 RX ADMIN — PALONOSETRON 0.25 MG: 0.25 INJECTION, SOLUTION INTRAVENOUS at 11:26

## 2023-10-30 RX ADMIN — SODIUM CHLORIDE 240 MG: 9 INJECTION, SOLUTION INTRAVENOUS at 10:05

## 2023-10-30 RX ADMIN — DEXAMETHASONE SODIUM PHOSPHATE 12 MG: 4 INJECTION, SOLUTION INTRA-ARTICULAR; INTRALESIONAL; INTRAMUSCULAR; INTRAVENOUS; SOFT TISSUE at 11:31

## 2023-11-01 ENCOUNTER — HOSPITAL ENCOUNTER (OUTPATIENT)
Dept: ONCOLOGY | Facility: HOSPITAL | Age: 73
Discharge: HOME | End: 2023-11-01
Admitting: INTERNAL MEDICINE
Payer: MEDICARE

## 2023-11-01 DIAGNOSIS — K22.89 ESOPHAGEAL MASS: Primary | ICD-10-CM

## 2023-11-01 PROCEDURE — 25010000002 HEPARIN LOCK FLUSH PER 10 UNITS: Performed by: INTERNAL MEDICINE

## 2023-11-01 RX ORDER — SODIUM CHLORIDE 0.9 % (FLUSH) 0.9 %
20 SYRINGE (ML) INJECTION AS NEEDED
OUTPATIENT
Start: 2023-11-01

## 2023-11-01 RX ORDER — HEPARIN SODIUM (PORCINE) LOCK FLUSH IV SOLN 100 UNIT/ML 100 UNIT/ML
500 SOLUTION INTRAVENOUS AS NEEDED
Status: DISCONTINUED | OUTPATIENT
Start: 2023-11-01 | End: 2023-11-02 | Stop reason: HOSPADM

## 2023-11-01 RX ORDER — SODIUM CHLORIDE 0.9 % (FLUSH) 0.9 %
20 SYRINGE (ML) INJECTION AS NEEDED
Status: DISCONTINUED | OUTPATIENT
Start: 2023-11-01 | End: 2023-11-02 | Stop reason: HOSPADM

## 2023-11-01 RX ORDER — HEPARIN SODIUM (PORCINE) LOCK FLUSH IV SOLN 100 UNIT/ML 100 UNIT/ML
500 SOLUTION INTRAVENOUS AS NEEDED
OUTPATIENT
Start: 2023-11-01

## 2023-11-01 RX ADMIN — Medication 20 ML: at 14:25

## 2023-11-01 RX ADMIN — HEPARIN 500 UNITS: 100 SYRINGE at 14:25

## 2023-11-09 NOTE — PROGRESS NOTES
HEMATOLOGY ONCOLOGY OUTPATIENT FOLLOW UP       Patient name: Aravind Lim  : 1950  MRN: [id number]  Primary Care Physician: Abiodun Steele MD  Referring Physician: No ref. provider found  Reason For Consult: Esophageal cancer      History of Present Illness:  Patient is a 73 y.o. male with recently diagnosed esophageal adenocarcinoma.    Patient has been having difficulty swallowing for the 5 months prior to presentation this is been progressing gradually.  Patient has been an active smoker with 50-pack-year smoking history.  He denies significant alcohol intake or GERD history.  His family history is positive for malignancy and 2 sisters and one nephew but he is unsure of the type of malignancy.  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  Enlarged mesenteric lymph node    2023 -PET/CT with large distal esophageal mass extending to the digastric cardia compatible with neoplasm.  Hypermetabolic node in the right thoracic inlet compatible with metastatic disease.  Bone lesion of L4 is most compatible with metastatic disease.  Largest lung nodule was not hypermetabolic likely benign process  CARIS NGS negative for HER 2, low TMB, FGFR2 amplified, PDL1 CPS 3      23 - CT guided biopsy of L4 - metastatic poorly differentiated carcinoma. Likely from esophageal primary.    2023 cycle 1 FOLFOX Opdivo  10/16/2023 - C 3 FOLFOX Opdivo.  10/30/2023 - C4 FOLFOX opdivo  23 - C5 FOLFOX opdivo decrease dose of oxaliplatin to 65 mg/m2    Subjective:  Swallowing normal, loss of taste.    Past Medical History:   Diagnosis Date    Anemia 2023     Anxiety 06/07/2022    Bilateral cataracts 06/23/2023    Colon polyp     Constipation 05/03/2022    BM daily w/ mineral oil.    COPD (chronic obstructive pulmonary disease)     COVID-19 08/16/2022    Dysphagia     Fatigue 05/03/2022    GERD (gastroesophageal reflux disease)     History of hiatal hernia     Impacted cerumen of right ear 05/03/2022    Prediabetes 05/10/2022       No past surgical history on file.      Current Outpatient Medications:     albuterol sulfate  (90 Base) MCG/ACT inhaler, Inhale 2 puffs Every 4 (Four) Hours As Needed., Disp: , Rfl:     budesonide-formoterol (SYMBICORT) 160-4.5 MCG/ACT inhaler, Inhale 2 puffs 2 (Two) Times a Day., Disp: , Rfl:     lidocaine-prilocaine (EMLA) 2.5-2.5 % cream, Apply 1 application  topically to the appropriate area as directed As Needed (apply 30-60 minutes prior to port access)., Disp: 30 g, Rfl: 2    ondansetron (ZOFRAN) 8 MG tablet, Take 1 tablet by mouth 3 (Three) Times a Day As Needed for Nausea or Vomiting., Disp: 30 tablet, Rfl: 5    vitamin B-12 (CYANOCOBALAMIN) 1000 MCG tablet, Take 1 tablet by mouth Daily., Disp: , Rfl:     Cholecalciferol 50 MCG (2000 UT) tablet, Take 1 tablet by mouth Daily. (Patient not taking: Reported on 10/2/2023), Disp: , Rfl:     omeprazole (priLOSEC) 40 MG capsule, Take 1 capsule by mouth Every Morning. (Patient not taking: Reported on 10/2/2023), Disp: , Rfl:     Sorbitol 70 % solution, TAKE 100 ML BY MOUTH as directed FOR ONE DAY (Patient not taking: Reported on 8/21/2023), Disp: , Rfl:   No current facility-administered medications for this visit.    Facility-Administered Medications Ordered in Other Visits:     fluorouracil (ADRUCIL) 4,580 mg in sodium chloride 0.9 % 240 mL chemo infusion - FOR HOME USE, 2,400 mg/m2 (Treatment Plan Recorded), Intravenous, Once, Sophie Benito MD    fluorouracil (ADRUCIL) chemo injection 760 mg, 400 mg/m2 (Treatment Plan Recorded), Intravenous, Once, Sophie Benito MD    OXALIplatin  "(ELOXATIN) 125 mg in dextrose (D5W) 5 % 275 mL chemo IVPB, 65 mg/m2 (Treatment Plan Recorded), Intravenous, Once, Sophie Benito MD, Last Rate: 145 mL/hr at 11/13/23 1058, 125 mg at 11/13/23 1058    No Known Allergies    Family History   Problem Relation Age of Onset    Diabetes Mother     Diabetes Father     Cancer Sister         Unknown of type       Cancer-related family history includes Cancer in his sister.      Social History     Tobacco Use    Smoking status: Every Day     Packs/day: 1     Types: Cigarettes   Vaping Use    Vaping Use: Never used   Substance Use Topics    Alcohol use: Never    Drug use: Never     Social History     Social History Narrative    Not on file       Objective:    Vital Signs:  Vitals:    11/13/23 0843   BP: 144/68   Pulse: 102   Resp: 16   Temp: 97.3 °F (36.3 °C)   SpO2: 100%   Weight: 62.1 kg (137 lb)   Height: 185.4 cm (73\")   PainSc: 0-No pain         Body mass index is 18.07 kg/m².    ECOG  (1) Restricted in physically strenuous activity, ambulatory and able to do work of light nature    Physical Exam:   Physical Exam  Constitutional:       Appearance: Normal appearance.   HENT:      Head: Normocephalic and atraumatic.   Eyes:      Pupils: Pupils are equal, round, and reactive to light.   Cardiovascular:      Rate and Rhythm: Normal rate and regular rhythm.      Pulses: Normal pulses.      Heart sounds: No murmur heard.  Pulmonary:      Effort: Pulmonary effort is normal.      Breath sounds: Normal breath sounds.   Abdominal:      General: There is no distension.      Palpations: Abdomen is soft. There is no mass.      Tenderness: There is no abdominal tenderness.   Musculoskeletal:         General: Normal range of motion.      Cervical back: Normal range of motion and neck supple.   Skin:     General: Skin is warm.   Neurological:      General: No focal deficit present.      Mental Status: He is alert and oriented to person, place, and time.   Psychiatric:         Mood and " "Affect: Mood normal.         Lab Results - Last 18 Months   Lab Units 11/13/23  0810 10/30/23  0840 10/16/23  0833   WBC 10*3/mm3 3.46 3.96 4.42   HEMOGLOBIN g/dL 11.5* 10.9* 11.4*   HEMATOCRIT % 35.4* 33.7* 34.4*   PLATELETS 10*3/mm3 92* 147 209   MCV fL 87.4 88.5 88.0     Lab Results - Last 18 Months   Lab Units 11/13/23  0810 10/30/23  0839 10/16/23  0833   SODIUM mmol/L 130* 130* 131*   POTASSIUM mmol/L 4.2 4.5 4.2   CHLORIDE mmol/L 93* 96* 96*   CO2 mmol/L 26.0 23.0 25.0   BUN mg/dL 12 13 16   CREATININE mg/dL 0.68* 0.63* 0.77   CALCIUM mg/dL 9.7 9.2 9.6   BILIRUBIN mg/dL 0.3 0.2 0.2   ALK PHOS U/L 98 90 95   ALT (SGPT) U/L 53* 16 14   AST (SGOT) U/L 52* 21 22   GLUCOSE mg/dL 169* 112* 89       Lab Results   Component Value Date    GLUCOSE 169 (H) 11/13/2023    BUN 12 11/13/2023    CREATININE 0.68 (L) 11/13/2023    BCR 17.6 11/13/2023    K 4.2 11/13/2023    CO2 26.0 11/13/2023    CALCIUM 9.7 11/13/2023    ALBUMIN 4.0 11/13/2023    AST 52 (H) 11/13/2023    ALT 53 (H) 11/13/2023       Lab Results - Last 18 Months   Lab Units 08/29/23  1031   INR  0.94   APTT seconds 28.6       No results found for: \"IRON\", \"TIBC\", \"FERRITIN\"    No results found for: \"FOLATE\"    No results found for: \"OCCULTBLD\"    No results found for: \"RETICCTPCT\"  No results found for: \"LERIKGKD92\"  No results found for: \"SPEP\", \"UPEP\"  No results found for: \"LDH\", \"URICACID\"  No results found for: \"LEO\", \"RF\", \"SEDRATE\"  No results found for: \"FIBRINOGEN\", \"HAPTOGLOBIN\"  Lab Results   Component Value Date    PTT 28.6 08/29/2023    INR 0.94 08/29/2023     No results found for: \"\"  No results found for: \"CEA\"  No components found for: \"CA-19-9\"  No results found for: \"PSA\"  No results found for: \"SEDRATE\"       Assessment & Plan     Patient is a 73-year-old male with esophageal adenocarcinoma of the distal esophagus.    Esophageal adenocarcinoma  Given CT imaging there is concern for metastatic lymph node in the mesentery  PET/CT with " hypermetabolic lymph node, metastasis in the lumbar spine L4  Case discussed at our multidisciplinary tumor board conference    S/p biopsy of spinal lesion with metastatic disease.   Discussed starting treatment with FOLFOX opdivo given negative HER and NGS profile though PDL1 is low CPS 3  Started FOLFOX had significant chemo-induced nausea  Now improved added emend   Clinical improvement with swallowing  Increased fatigue, myelosupression decrease oxaliplatin to 65 mg/m2  Repeat CT imaging now.     Chemotherapy induced nausea vomiting  emend     Poor appetite  Down with taste changes    Thank you very much for providing the opportunity to participate in this patient’s care. Please do not hesitate to call if there are any other questions.    Follow-up in 2 weeks with next treatment  Time spent on encounter including record review, history taking, exam, discussion, counseling and documentation at: 42 minutes

## 2023-11-13 ENCOUNTER — DOCUMENTATION (OUTPATIENT)
Dept: ONCOLOGY | Facility: CLINIC | Age: 73
End: 2023-11-13
Payer: MEDICARE

## 2023-11-13 ENCOUNTER — OFFICE VISIT (OUTPATIENT)
Dept: ONCOLOGY | Facility: CLINIC | Age: 73
End: 2023-11-13
Payer: MEDICARE

## 2023-11-13 ENCOUNTER — HOSPITAL ENCOUNTER (OUTPATIENT)
Dept: ONCOLOGY | Facility: HOSPITAL | Age: 73
Discharge: HOME | End: 2023-11-13
Admitting: INTERNAL MEDICINE
Payer: MEDICARE

## 2023-11-13 VITALS
HEART RATE: 102 BPM | OXYGEN SATURATION: 100 % | WEIGHT: 137.5 LBS | BODY MASS INDEX: 18.22 KG/M2 | RESPIRATION RATE: 16 BRPM | SYSTOLIC BLOOD PRESSURE: 144 MMHG | HEIGHT: 73 IN | DIASTOLIC BLOOD PRESSURE: 68 MMHG | TEMPERATURE: 97.3 F

## 2023-11-13 VITALS
OXYGEN SATURATION: 100 % | HEIGHT: 73 IN | BODY MASS INDEX: 18.16 KG/M2 | HEART RATE: 102 BPM | WEIGHT: 137 LBS | SYSTOLIC BLOOD PRESSURE: 144 MMHG | TEMPERATURE: 97.3 F | DIASTOLIC BLOOD PRESSURE: 68 MMHG | RESPIRATION RATE: 16 BRPM

## 2023-11-13 DIAGNOSIS — C78.89 METASTATIC ADENOCARCINOMA TO ESOPHAGUS: Primary | ICD-10-CM

## 2023-11-13 DIAGNOSIS — K22.89 ESOPHAGEAL MASS: Primary | ICD-10-CM

## 2023-11-13 DIAGNOSIS — C79.51 METASTATIC CANCER TO SPINE: ICD-10-CM

## 2023-11-13 LAB
ALBUMIN SERPL-MCNC: 4 G/DL (ref 3.5–5.2)
ALBUMIN/GLOB SERPL: 1.7 G/DL
ALP SERPL-CCNC: 98 U/L (ref 39–117)
ALT SERPL W P-5'-P-CCNC: 53 U/L (ref 1–41)
ANION GAP SERPL CALCULATED.3IONS-SCNC: 11 MMOL/L (ref 5–15)
AST SERPL-CCNC: 52 U/L (ref 1–40)
BASOPHILS # BLD AUTO: 0.01 10*3/MM3 (ref 0–0.2)
BASOPHILS NFR BLD AUTO: 0.3 % (ref 0–1.5)
BILIRUB SERPL-MCNC: 0.3 MG/DL (ref 0–1.2)
BUN SERPL-MCNC: 12 MG/DL (ref 8–23)
BUN/CREAT SERPL: 17.6 (ref 7–25)
CALCIUM SPEC-SCNC: 9.7 MG/DL (ref 8.6–10.5)
CHLORIDE SERPL-SCNC: 93 MMOL/L (ref 98–107)
CO2 SERPL-SCNC: 26 MMOL/L (ref 22–29)
CREAT SERPL-MCNC: 0.68 MG/DL (ref 0.76–1.27)
DEPRECATED RDW RBC AUTO: 45.1 FL (ref 37–54)
EGFRCR SERPLBLD CKD-EPI 2021: 98.8 ML/MIN/1.73
EOSINOPHIL # BLD AUTO: 0.03 10*3/MM3 (ref 0–0.4)
EOSINOPHIL NFR BLD AUTO: 0.9 % (ref 0.3–6.2)
ERYTHROCYTE [DISTWIDTH] IN BLOOD BY AUTOMATED COUNT: 14.9 % (ref 12.3–15.4)
GLOBULIN UR ELPH-MCNC: 2.4 GM/DL
GLUCOSE BLDC GLUCOMTR-MCNC: 165 MG/DL (ref 70–105)
GLUCOSE SERPL-MCNC: 169 MG/DL (ref 65–99)
HCT VFR BLD AUTO: 35.4 % (ref 37.5–51)
HGB BLD-MCNC: 11.5 G/DL (ref 13–17.7)
LYMPHOCYTES # BLD AUTO: 1.14 10*3/MM3 (ref 0.7–3.1)
LYMPHOCYTES NFR BLD AUTO: 32.9 % (ref 19.6–45.3)
MCH RBC QN AUTO: 28.4 PG (ref 26.6–33)
MCHC RBC AUTO-ENTMCNC: 32.5 G/DL (ref 31.5–35.7)
MCV RBC AUTO: 87.4 FL (ref 79–97)
MONOCYTES # BLD AUTO: 0.61 10*3/MM3 (ref 0.1–0.9)
MONOCYTES NFR BLD AUTO: 17.6 % (ref 5–12)
NEUTROPHILS NFR BLD AUTO: 1.67 10*3/MM3 (ref 1.7–7)
NEUTROPHILS NFR BLD AUTO: 48.3 % (ref 42.7–76)
PLATELET # BLD AUTO: 92 10*3/MM3 (ref 140–450)
PMV BLD AUTO: 10 FL (ref 6–12)
POTASSIUM SERPL-SCNC: 4.2 MMOL/L (ref 3.5–5.2)
PROT SERPL-MCNC: 6.4 G/DL (ref 6–8.5)
RBC # BLD AUTO: 4.05 10*6/MM3 (ref 4.14–5.8)
SODIUM SERPL-SCNC: 130 MMOL/L (ref 136–145)
WBC NRBC COR # BLD: 3.46 10*3/MM3 (ref 3.4–10.8)

## 2023-11-13 PROCEDURE — 96417 CHEMO IV INFUS EACH ADDL SEQ: CPT

## 2023-11-13 PROCEDURE — 96416 CHEMO PROLONG INFUSE W/PUMP: CPT

## 2023-11-13 PROCEDURE — 25010000002 NIVOLUMAB 240 MG/24ML SOLUTION 24 ML VIAL: Performed by: INTERNAL MEDICINE

## 2023-11-13 PROCEDURE — 96415 CHEMO IV INFUSION ADDL HR: CPT

## 2023-11-13 PROCEDURE — 96375 TX/PRO/DX INJ NEW DRUG ADDON: CPT

## 2023-11-13 PROCEDURE — 25810000003 SODIUM CHLORIDE 0.9 % SOLUTION 250 ML FLEX CONT: Performed by: INTERNAL MEDICINE

## 2023-11-13 PROCEDURE — G0498 CHEMO EXTEND IV INFUS W/PUMP: HCPCS

## 2023-11-13 PROCEDURE — 25010000002 OXALIPLATIN PER 0.5 MG: Performed by: INTERNAL MEDICINE

## 2023-11-13 PROCEDURE — 0 DEXTROSE 5 % SOLUTION 250 ML FLEX CONT: Performed by: INTERNAL MEDICINE

## 2023-11-13 PROCEDURE — 96409 CHEMO IV PUSH SNGL DRUG: CPT

## 2023-11-13 PROCEDURE — 25010000002 FOSAPREPITANT PER 1 MG: Performed by: INTERNAL MEDICINE

## 2023-11-13 PROCEDURE — 80053 COMPREHEN METABOLIC PANEL: CPT | Performed by: INTERNAL MEDICINE

## 2023-11-13 PROCEDURE — 25010000002 LEUCOVORIN CALCIUM PER 50 MG: Performed by: INTERNAL MEDICINE

## 2023-11-13 PROCEDURE — 96367 TX/PROPH/DG ADDL SEQ IV INF: CPT

## 2023-11-13 PROCEDURE — 82948 REAGENT STRIP/BLOOD GLUCOSE: CPT

## 2023-11-13 PROCEDURE — 96368 THER/DIAG CONCURRENT INF: CPT

## 2023-11-13 PROCEDURE — 25010000002 PALONOSETRON 0.25 MG/5ML SOLUTION PREFILLED SYRINGE: Performed by: INTERNAL MEDICINE

## 2023-11-13 PROCEDURE — 25010000002 DEXAMETHASONE SODIUM PHOSPHATE 120 MG/30ML SOLUTION: Performed by: INTERNAL MEDICINE

## 2023-11-13 PROCEDURE — 0 DEXTROSE 5 % SOLUTION: Performed by: INTERNAL MEDICINE

## 2023-11-13 PROCEDURE — 85025 COMPLETE CBC W/AUTO DIFF WBC: CPT | Performed by: INTERNAL MEDICINE

## 2023-11-13 PROCEDURE — 96411 CHEMO IV PUSH ADDL DRUG: CPT

## 2023-11-13 PROCEDURE — 25010000002 FLUOROURACIL PER 500 MG: Performed by: INTERNAL MEDICINE

## 2023-11-13 PROCEDURE — 96413 CHEMO IV INFUSION 1 HR: CPT

## 2023-11-13 RX ORDER — FAMOTIDINE 10 MG/ML
20 INJECTION, SOLUTION INTRAVENOUS AS NEEDED
Status: CANCELLED | OUTPATIENT
Start: 2023-11-13

## 2023-11-13 RX ORDER — DEXTROSE MONOHYDRATE 50 MG/ML
250 INJECTION, SOLUTION INTRAVENOUS ONCE
Status: COMPLETED | OUTPATIENT
Start: 2023-11-13 | End: 2023-11-13

## 2023-11-13 RX ORDER — PALONOSETRON 0.05 MG/ML
0.25 INJECTION, SOLUTION INTRAVENOUS ONCE
Status: COMPLETED | OUTPATIENT
Start: 2023-11-13 | End: 2023-11-13

## 2023-11-13 RX ORDER — FLUOROURACIL 50 MG/ML
400 INJECTION, SOLUTION INTRAVENOUS ONCE
Status: COMPLETED | OUTPATIENT
Start: 2023-11-13 | End: 2023-11-13

## 2023-11-13 RX ORDER — DIPHENHYDRAMINE HYDROCHLORIDE 50 MG/ML
50 INJECTION INTRAMUSCULAR; INTRAVENOUS AS NEEDED
Status: CANCELLED | OUTPATIENT
Start: 2023-11-13

## 2023-11-13 RX ADMIN — FLUOROURACIL 4580 MG: 50 INJECTION, SOLUTION INTRAVENOUS at 13:15

## 2023-11-13 RX ADMIN — FOSAPREPITANT 100 ML: 150 INJECTION, POWDER, LYOPHILIZED, FOR SOLUTION INTRAVENOUS at 09:49

## 2023-11-13 RX ADMIN — LEUCOVORIN CALCIUM 760 MG: 100 INJECTION, POWDER, LYOPHILIZED, FOR SUSPENSION INTRAMUSCULAR; INTRAVENOUS at 10:56

## 2023-11-13 RX ADMIN — PALONOSETRON 0.25 MG: 0.25 INJECTION, SOLUTION INTRAVENOUS at 09:01

## 2023-11-13 RX ADMIN — DEXAMETHASONE SODIUM PHOSPHATE 12 MG: 4 INJECTION, SOLUTION INTRA-ARTICULAR; INTRALESIONAL; INTRAMUSCULAR; INTRAVENOUS; SOFT TISSUE at 10:30

## 2023-11-13 RX ADMIN — DEXTROSE MONOHYDRATE 250 ML: 50 INJECTION, SOLUTION INTRAVENOUS at 09:00

## 2023-11-13 RX ADMIN — SODIUM CHLORIDE 240 MG: 9 INJECTION, SOLUTION INTRAVENOUS at 09:16

## 2023-11-13 RX ADMIN — FLUOROURACIL 760 MG: 50 INJECTION, SOLUTION INTRAVENOUS at 13:09

## 2023-11-13 RX ADMIN — OXALIPLATIN 125 MG: 5 INJECTION, SOLUTION INTRAVENOUS at 10:58

## 2023-11-13 NOTE — PROGRESS NOTES
Patient is here for C5D1 opdivo, oxalaplatin, leucovorin and 5FU push/pump. Port accessed and flushed with good blood return noted. 10cc of blood wasted prior to specimen collection. Blood specimen obtained and sent to lab for processing per protocol.     Dr. Benito sent: Patient is here for C5D1 opdivo, oxaliplatin, 5FUpump/push. His main complaint today is that his cough and mucous is getting worse after each treatment to the point that sometimes when he is trying to get the mucous up it makes it hard to breath. He stated laying down it is harder to breath as well. He stated the fatigue after each treatment is lasting longer and he does not have much of an appetite. He stated he usually feels good for 3 days after treatment and then he starts feeling the above until the day before his next treatment. His platelets were not in parameters-they were 92 today. you are seeing him today. Is it ok to start treatment with his platelets at 92 and prior to you seeing him?11/13/23  [ Day 1 ], Cycle 5  WBC: 3.46  Neutrophils Absolute: 1.67 (L)  Hemoglobin: 11.5 (L)  Hematocrit: 35.4 (L)  Platelets: 92 (L)  Glucose: 165 (H) (Comment: Serial Number: [id number])     Dr. Benito responded: with his symptoms, borderline counts, we can dose reduce oxaliplatin to 65 mg/m2. neuropathy?     Dr. Benito sent: neuropathy-he did get some in his hands after the last treatment but it did go away and he does not have any tingling currently. Patient tolerated treatment and was discharged with AVS.

## 2023-11-15 ENCOUNTER — HOSPITAL ENCOUNTER (OUTPATIENT)
Dept: ONCOLOGY | Facility: HOSPITAL | Age: 73
Discharge: HOME | End: 2023-11-15
Admitting: INTERNAL MEDICINE
Payer: MEDICARE

## 2023-11-15 VITALS — SYSTOLIC BLOOD PRESSURE: 108 MMHG | HEART RATE: 90 BPM | DIASTOLIC BLOOD PRESSURE: 64 MMHG

## 2023-11-15 DIAGNOSIS — K22.89 ESOPHAGEAL MASS: Primary | ICD-10-CM

## 2023-11-15 DIAGNOSIS — E86.0 DEHYDRATION: ICD-10-CM

## 2023-11-15 PROCEDURE — 96360 HYDRATION IV INFUSION INIT: CPT

## 2023-11-15 PROCEDURE — 25810000003 SODIUM CHLORIDE 0.9 % SOLUTION: Performed by: INTERNAL MEDICINE

## 2023-11-15 PROCEDURE — 25010000002 HEPARIN LOCK FLUSH PER 10 UNITS: Performed by: INTERNAL MEDICINE

## 2023-11-15 RX ORDER — HEPARIN SODIUM (PORCINE) LOCK FLUSH IV SOLN 100 UNIT/ML 100 UNIT/ML
500 SOLUTION INTRAVENOUS AS NEEDED
OUTPATIENT
Start: 2023-11-15

## 2023-11-15 RX ORDER — SODIUM CHLORIDE 0.9 % (FLUSH) 0.9 %
20 SYRINGE (ML) INJECTION AS NEEDED
Status: DISCONTINUED | OUTPATIENT
Start: 2023-11-15 | End: 2023-11-16 | Stop reason: HOSPADM

## 2023-11-15 RX ORDER — HEPARIN SODIUM (PORCINE) LOCK FLUSH IV SOLN 100 UNIT/ML 100 UNIT/ML
500 SOLUTION INTRAVENOUS AS NEEDED
Status: DISCONTINUED | OUTPATIENT
Start: 2023-11-15 | End: 2023-11-16 | Stop reason: HOSPADM

## 2023-11-15 RX ORDER — SODIUM CHLORIDE 0.9 % (FLUSH) 0.9 %
20 SYRINGE (ML) INJECTION AS NEEDED
OUTPATIENT
Start: 2023-11-15

## 2023-11-15 RX ADMIN — SODIUM CHLORIDE 1000 ML: 9 INJECTION, SOLUTION INTRAVENOUS at 14:30

## 2023-11-15 RX ADMIN — HEPARIN 500 UNITS: 100 SYRINGE at 15:31

## 2023-11-15 RX ADMIN — Medication 20 ML: at 15:31

## 2023-11-22 ENCOUNTER — HOSPITAL ENCOUNTER (OUTPATIENT)
Dept: PET IMAGING | Facility: HOSPITAL | Age: 73
Discharge: HOME | End: 2023-11-22
Admitting: INTERNAL MEDICINE
Payer: MEDICARE

## 2023-11-22 DIAGNOSIS — C79.51 METASTATIC CANCER TO SPINE: ICD-10-CM

## 2023-11-22 DIAGNOSIS — C78.89 METASTATIC ADENOCARCINOMA TO ESOPHAGUS: ICD-10-CM

## 2023-11-22 PROCEDURE — 74177 CT ABD & PELVIS W/CONTRAST: CPT

## 2023-11-22 PROCEDURE — 71260 CT THORAX DX C+: CPT

## 2023-11-22 PROCEDURE — 25510000001 IOPAMIDOL PER 1 ML: Performed by: INTERNAL MEDICINE

## 2023-11-22 RX ADMIN — IOPAMIDOL 100 ML: 755 INJECTION, SOLUTION INTRAVENOUS at 13:26

## 2023-11-27 ENCOUNTER — HOSPITAL ENCOUNTER (OUTPATIENT)
Dept: ONCOLOGY | Facility: HOSPITAL | Age: 73
Discharge: HOME | End: 2023-11-27
Admitting: INTERNAL MEDICINE
Payer: MEDICARE

## 2023-11-27 ENCOUNTER — OFFICE VISIT (OUTPATIENT)
Dept: ONCOLOGY | Facility: CLINIC | Age: 73
End: 2023-11-27
Payer: MEDICARE

## 2023-11-27 VITALS
HEIGHT: 73 IN | TEMPERATURE: 97.4 F | DIASTOLIC BLOOD PRESSURE: 70 MMHG | HEART RATE: 99 BPM | SYSTOLIC BLOOD PRESSURE: 133 MMHG | BODY MASS INDEX: 18.16 KG/M2 | WEIGHT: 137 LBS | RESPIRATION RATE: 16 BRPM | OXYGEN SATURATION: 97 %

## 2023-11-27 VITALS
DIASTOLIC BLOOD PRESSURE: 70 MMHG | TEMPERATURE: 97.4 F | BODY MASS INDEX: 18.16 KG/M2 | RESPIRATION RATE: 16 BRPM | SYSTOLIC BLOOD PRESSURE: 133 MMHG | HEART RATE: 99 BPM | HEIGHT: 73 IN | WEIGHT: 137 LBS | OXYGEN SATURATION: 97 %

## 2023-11-27 DIAGNOSIS — C78.89 METASTATIC ADENOCARCINOMA TO ESOPHAGUS: Primary | ICD-10-CM

## 2023-11-27 DIAGNOSIS — K22.89 ESOPHAGEAL MASS: Primary | ICD-10-CM

## 2023-11-27 DIAGNOSIS — K22.89 ESOPHAGEAL MASS: ICD-10-CM

## 2023-11-27 LAB
ALBUMIN SERPL-MCNC: 3.8 G/DL (ref 3.5–5.2)
ALBUMIN/GLOB SERPL: 1.5 G/DL
ALP SERPL-CCNC: 95 U/L (ref 39–117)
ALT SERPL W P-5'-P-CCNC: 40 U/L (ref 1–41)
ANION GAP SERPL CALCULATED.3IONS-SCNC: 10 MMOL/L (ref 5–15)
AST SERPL-CCNC: 48 U/L (ref 1–40)
BASOPHILS # BLD AUTO: 0.02 10*3/MM3 (ref 0–0.2)
BASOPHILS NFR BLD AUTO: 0.6 % (ref 0–1.5)
BILIRUB SERPL-MCNC: 0.3 MG/DL (ref 0–1.2)
BUN SERPL-MCNC: 14 MG/DL (ref 8–23)
BUN/CREAT SERPL: 17.9 (ref 7–25)
CALCIUM SPEC-SCNC: 9.4 MG/DL (ref 8.6–10.5)
CHLORIDE SERPL-SCNC: 96 MMOL/L (ref 98–107)
CO2 SERPL-SCNC: 26 MMOL/L (ref 22–29)
CREAT SERPL-MCNC: 0.78 MG/DL (ref 0.76–1.27)
DEPRECATED RDW RBC AUTO: 50.2 FL (ref 37–54)
EGFRCR SERPLBLD CKD-EPI 2021: 94.8 ML/MIN/1.73
EOSINOPHIL # BLD AUTO: 0.03 10*3/MM3 (ref 0–0.4)
EOSINOPHIL NFR BLD AUTO: 0.9 % (ref 0.3–6.2)
ERYTHROCYTE [DISTWIDTH] IN BLOOD BY AUTOMATED COUNT: 16.6 % (ref 12.3–15.4)
GLOBULIN UR ELPH-MCNC: 2.6 GM/DL
GLUCOSE BLDC GLUCOMTR-MCNC: 141 MG/DL (ref 70–105)
GLUCOSE SERPL-MCNC: 150 MG/DL (ref 65–99)
HCT VFR BLD AUTO: 35.5 % (ref 37.5–51)
HGB BLD-MCNC: 11.6 G/DL (ref 13–17.7)
LYMPHOCYTES # BLD AUTO: 1.07 10*3/MM3 (ref 0.7–3.1)
LYMPHOCYTES NFR BLD AUTO: 33.5 % (ref 19.6–45.3)
MCH RBC QN AUTO: 29 PG (ref 26.6–33)
MCHC RBC AUTO-ENTMCNC: 32.7 G/DL (ref 31.5–35.7)
MCV RBC AUTO: 88.8 FL (ref 79–97)
MONOCYTES # BLD AUTO: 0.65 10*3/MM3 (ref 0.1–0.9)
MONOCYTES NFR BLD AUTO: 20.4 % (ref 5–12)
NEUTROPHILS NFR BLD AUTO: 1.42 10*3/MM3 (ref 1.7–7)
NEUTROPHILS NFR BLD AUTO: 44.6 % (ref 42.7–76)
PLATELET # BLD AUTO: 81 10*3/MM3 (ref 140–450)
PMV BLD AUTO: 11.1 FL (ref 6–12)
POTASSIUM SERPL-SCNC: 4.2 MMOL/L (ref 3.5–5.2)
PROT SERPL-MCNC: 6.4 G/DL (ref 6–8.5)
RBC # BLD AUTO: 4 10*6/MM3 (ref 4.14–5.8)
SODIUM SERPL-SCNC: 132 MMOL/L (ref 136–145)
WBC NRBC COR # BLD AUTO: 3.19 10*3/MM3 (ref 3.4–10.8)

## 2023-11-27 PROCEDURE — 25010000002 LEUCOVORIN CALCIUM PER 50 MG: Performed by: INTERNAL MEDICINE

## 2023-11-27 PROCEDURE — 96375 TX/PRO/DX INJ NEW DRUG ADDON: CPT

## 2023-11-27 PROCEDURE — 96415 CHEMO IV INFUSION ADDL HR: CPT

## 2023-11-27 PROCEDURE — 25010000002 NIVOLUMAB 240 MG/24ML SOLUTION 24 ML VIAL: Performed by: INTERNAL MEDICINE

## 2023-11-27 PROCEDURE — 96368 THER/DIAG CONCURRENT INF: CPT

## 2023-11-27 PROCEDURE — 25010000002 DEXAMETHASONE SODIUM PHOSPHATE 120 MG/30ML SOLUTION: Performed by: INTERNAL MEDICINE

## 2023-11-27 PROCEDURE — 80053 COMPREHEN METABOLIC PANEL: CPT | Performed by: INTERNAL MEDICINE

## 2023-11-27 PROCEDURE — 0 DEXTROSE 5 % SOLUTION 250 ML FLEX CONT: Performed by: INTERNAL MEDICINE

## 2023-11-27 PROCEDURE — 96413 CHEMO IV INFUSION 1 HR: CPT

## 2023-11-27 PROCEDURE — 96367 TX/PROPH/DG ADDL SEQ IV INF: CPT

## 2023-11-27 PROCEDURE — 85025 COMPLETE CBC W/AUTO DIFF WBC: CPT | Performed by: INTERNAL MEDICINE

## 2023-11-27 PROCEDURE — 1126F AMNT PAIN NOTED NONE PRSNT: CPT | Performed by: INTERNAL MEDICINE

## 2023-11-27 PROCEDURE — 82948 REAGENT STRIP/BLOOD GLUCOSE: CPT

## 2023-11-27 PROCEDURE — G0498 CHEMO EXTEND IV INFUS W/PUMP: HCPCS

## 2023-11-27 PROCEDURE — 25010000002 LEUCOVORIN 200 MG RECONSTITUTED SOLUTION 200 MG VIAL: Performed by: INTERNAL MEDICINE

## 2023-11-27 PROCEDURE — 99215 OFFICE O/P EST HI 40 MIN: CPT | Performed by: INTERNAL MEDICINE

## 2023-11-27 PROCEDURE — 25010000002 PALONOSETRON 0.25 MG/5ML SOLUTION PREFILLED SYRINGE: Performed by: INTERNAL MEDICINE

## 2023-11-27 PROCEDURE — 96417 CHEMO IV INFUS EACH ADDL SEQ: CPT

## 2023-11-27 PROCEDURE — 25010000002 FOSAPREPITANT PER 1 MG: Performed by: INTERNAL MEDICINE

## 2023-11-27 PROCEDURE — 25810000003 SODIUM CHLORIDE 0.9 % SOLUTION 250 ML FLEX CONT: Performed by: INTERNAL MEDICINE

## 2023-11-27 PROCEDURE — 96416 CHEMO PROLONG INFUSE W/PUMP: CPT

## 2023-11-27 PROCEDURE — 25010000002 FLUOROURACIL PER 500 MG: Performed by: INTERNAL MEDICINE

## 2023-11-27 PROCEDURE — 25010000002 OXALIPLATIN PER 0.5 MG: Performed by: INTERNAL MEDICINE

## 2023-11-27 PROCEDURE — 0 DEXTROSE 5 % SOLUTION: Performed by: INTERNAL MEDICINE

## 2023-11-27 RX ORDER — SODIUM CHLORIDE 9 MG/ML
1000 INJECTION, SOLUTION INTRAVENOUS ONCE
Status: CANCELLED
Start: 2023-11-29 | End: 2023-11-29

## 2023-11-27 RX ORDER — DEXTROSE MONOHYDRATE 50 MG/ML
250 INJECTION, SOLUTION INTRAVENOUS ONCE
Status: CANCELLED | OUTPATIENT
Start: 2023-11-27

## 2023-11-27 RX ORDER — PALONOSETRON 0.05 MG/ML
0.25 INJECTION, SOLUTION INTRAVENOUS ONCE
Status: CANCELLED | OUTPATIENT
Start: 2023-11-27

## 2023-11-27 RX ORDER — DEXTROSE MONOHYDRATE 50 MG/ML
250 INJECTION, SOLUTION INTRAVENOUS ONCE
Status: COMPLETED | OUTPATIENT
Start: 2023-11-27 | End: 2023-11-27

## 2023-11-27 RX ORDER — PALONOSETRON 0.05 MG/ML
0.25 INJECTION, SOLUTION INTRAVENOUS ONCE
Status: COMPLETED | OUTPATIENT
Start: 2023-11-27 | End: 2023-11-27

## 2023-11-27 RX ORDER — FAMOTIDINE 10 MG/ML
20 INJECTION, SOLUTION INTRAVENOUS AS NEEDED
Status: CANCELLED | OUTPATIENT
Start: 2023-11-27

## 2023-11-27 RX ORDER — DIPHENHYDRAMINE HYDROCHLORIDE 50 MG/ML
50 INJECTION INTRAMUSCULAR; INTRAVENOUS AS NEEDED
Status: CANCELLED | OUTPATIENT
Start: 2023-11-27

## 2023-11-27 RX ADMIN — PALONOSETRON 0.25 MG: 0.25 INJECTION, SOLUTION INTRAVENOUS at 11:48

## 2023-11-27 RX ADMIN — LEUCOVORIN CALCIUM 750 MG: 350 INJECTION, POWDER, LYOPHILIZED, FOR SUSPENSION INTRAMUSCULAR; INTRAVENOUS at 12:17

## 2023-11-27 RX ADMIN — DEXTROSE MONOHYDRATE 250 ML: 50 INJECTION, SOLUTION INTRAVENOUS at 10:16

## 2023-11-27 RX ADMIN — FLUOROURACIL 4580 MG: 50 INJECTION, SOLUTION INTRAVENOUS at 14:29

## 2023-11-27 RX ADMIN — SODIUM CHLORIDE 240 MG: 9 INJECTION, SOLUTION INTRAVENOUS at 10:36

## 2023-11-27 RX ADMIN — DEXAMETHASONE SODIUM PHOSPHATE 12 MG: 4 INJECTION, SOLUTION INTRA-ARTICULAR; INTRALESIONAL; INTRAMUSCULAR; INTRAVENOUS; SOFT TISSUE at 11:50

## 2023-11-27 RX ADMIN — OXALIPLATIN 125 MG: 5 INJECTION, SOLUTION INTRAVENOUS at 12:18

## 2023-11-27 RX ADMIN — FOSAPREPITANT 100 ML: 150 INJECTION, POWDER, LYOPHILIZED, FOR SOLUTION INTRAVENOUS at 11:11

## 2023-11-27 NOTE — PROGRESS NOTES
HEMATOLOGY ONCOLOGY OUTPATIENT FOLLOW UP       Patient name: Aravind Lim  : 1950  MRN: [id number]  Primary Care Physician: Abiodun Steele MD  Referring Physician: Abiodun Steele MD  Reason For Consult: Esophageal cancer      History of Present Illness:  Patient is a 73 y.o. male with recently diagnosed esophageal adenocarcinoma.    Patient has been having difficulty swallowing for the 5 months prior to presentation this is been progressing gradually.  Patient has been an active smoker with 50-pack-year smoking history.  He denies significant alcohol intake or GERD history.  His family history is positive for malignancy and 2 sisters and one nephew but he is unsure of the type of malignancy.  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  Enlarged mesenteric lymph node    2023 -PET/CT with large distal esophageal mass extending to the digastric cardia compatible with neoplasm.  Hypermetabolic node in the right thoracic inlet compatible with metastatic disease.  Bone lesion of L4 is most compatible with metastatic disease.  Largest lung nodule was not hypermetabolic likely benign process  CARIS NGS negative for HER 2, low TMB, FGFR2 amplified, PDL1 CPS 3      23 - CT guided biopsy of L4 - metastatic poorly differentiated carcinoma. Likely from esophageal primary.    2023 cycle 1 FOLFOX Opdivo  10/16/2023 - C 3 FOLFOX Opdivo.  10/30/2023 - C4 FOLFOX opdivo  23 - C5 FOLFOX opdivo decrease dose of oxaliplatin to 65 mg/m2  Continues to be on FOLFOX Opdivo  2023 -CT chest abdomen pelvis with decrease in size of the right supraclavicular  and right paratracheal lymphadenopathy.  Circumferential masslike thickening of the distal esophagus consistent with the known primary.  Stable central mesenteric lymphadenopathy.  Increased sclerosis of the L4 vertebral body lesion indicating posttreatment change.  Cholelithiasis.    Subjective:  Continues to have no significant problems with swallowing.  Decreased appetite with taste changes    Past Medical History:   Diagnosis Date    Anemia 08/04/2023    Anxiety 06/07/2022    Bilateral cataracts 06/23/2023    Colon polyp     Constipation 05/03/2022    BM daily w/ mineral oil.    COPD (chronic obstructive pulmonary disease)     COVID-19 08/16/2022    Dysphagia     Fatigue 05/03/2022    GERD (gastroesophageal reflux disease)     History of hiatal hernia     Impacted cerumen of right ear 05/03/2022    Prediabetes 05/10/2022       No past surgical history on file.      Current Outpatient Medications:     albuterol sulfate  (90 Base) MCG/ACT inhaler, Inhale 2 puffs Every 4 (Four) Hours As Needed., Disp: , Rfl:     budesonide-formoterol (SYMBICORT) 160-4.5 MCG/ACT inhaler, Inhale 2 puffs 2 (Two) Times a Day., Disp: , Rfl:     Cholecalciferol 50 MCG (2000 UT) tablet, Take 1 tablet by mouth Daily. (Patient not taking: Reported on 10/2/2023), Disp: , Rfl:     lidocaine-prilocaine (EMLA) 2.5-2.5 % cream, Apply 1 application  topically to the appropriate area as directed As Needed (apply 30-60 minutes prior to port access)., Disp: 30 g, Rfl: 2    omeprazole (priLOSEC) 40 MG capsule, Take 1 capsule by mouth Every Morning. (Patient not taking: Reported on 10/2/2023), Disp: , Rfl:     ondansetron (ZOFRAN) 8 MG tablet, Take 1 tablet by mouth 3 (Three) Times a Day As Needed for Nausea or Vomiting., Disp: 30 tablet, Rfl: 5    Sorbitol 70 % solution, TAKE 100 ML BY MOUTH as directed FOR ONE DAY (Patient not taking: Reported on 8/21/2023), Disp: , Rfl:     vitamin B-12 (CYANOCOBALAMIN) 1000 MCG tablet, Take 1 tablet by  mouth Daily., Disp: , Rfl:     No Known Allergies    Family History   Problem Relation Age of Onset    Diabetes Mother     Diabetes Father     Cancer Sister         Unknown of type       Cancer-related family history includes Cancer in his sister.      Social History     Tobacco Use    Smoking status: Every Day     Packs/day: 1     Types: Cigarettes   Vaping Use    Vaping Use: Never used   Substance Use Topics    Alcohol use: Never    Drug use: Never     Social History     Social History Narrative    Not on file       Objective:    Vital Signs:  There were no vitals filed for this visit.        There is no height or weight on file to calculate BMI.    ECOG  (1) Restricted in physically strenuous activity, ambulatory and able to do work of light nature    Physical Exam:   Physical Exam  Constitutional:       Appearance: Normal appearance.   HENT:      Head: Normocephalic and atraumatic.   Eyes:      Pupils: Pupils are equal, round, and reactive to light.   Cardiovascular:      Rate and Rhythm: Normal rate and regular rhythm.      Pulses: Normal pulses.      Heart sounds: No murmur heard.  Pulmonary:      Effort: Pulmonary effort is normal.      Breath sounds: Normal breath sounds.   Abdominal:      General: There is no distension.      Palpations: Abdomen is soft. There is no mass.      Tenderness: There is no abdominal tenderness.   Musculoskeletal:         General: Normal range of motion.      Cervical back: Normal range of motion and neck supple.   Skin:     General: Skin is warm.   Neurological:      General: No focal deficit present.      Mental Status: He is alert and oriented to person, place, and time.   Psychiatric:         Mood and Affect: Mood normal.         Lab Results - Last 18 Months   Lab Units 11/13/23  0810 10/30/23  0840 10/16/23  0833   WBC 10*3/mm3 3.46 3.96 4.42   HEMOGLOBIN g/dL 11.5* 10.9* 11.4*   HEMATOCRIT % 35.4* 33.7* 34.4*   PLATELETS 10*3/mm3 92* 147 209   MCV fL 87.4 88.5 88.0     Lab  "Results - Last 18 Months   Lab Units 11/13/23  0810 10/30/23  0839 10/16/23  0833   SODIUM mmol/L 130* 130* 131*   POTASSIUM mmol/L 4.2 4.5 4.2   CHLORIDE mmol/L 93* 96* 96*   CO2 mmol/L 26.0 23.0 25.0   BUN mg/dL 12 13 16   CREATININE mg/dL 0.68* 0.63* 0.77   CALCIUM mg/dL 9.7 9.2 9.6   BILIRUBIN mg/dL 0.3 0.2 0.2   ALK PHOS U/L 98 90 95   ALT (SGPT) U/L 53* 16 14   AST (SGOT) U/L 52* 21 22   GLUCOSE mg/dL 169* 112* 89       Lab Results   Component Value Date    GLUCOSE 169 (H) 11/13/2023    BUN 12 11/13/2023    CREATININE 0.68 (L) 11/13/2023    BCR 17.6 11/13/2023    K 4.2 11/13/2023    CO2 26.0 11/13/2023    CALCIUM 9.7 11/13/2023    ALBUMIN 4.0 11/13/2023    AST 52 (H) 11/13/2023    ALT 53 (H) 11/13/2023       Lab Results - Last 18 Months   Lab Units 08/29/23  1031   INR  0.94   APTT seconds 28.6       No results found for: \"IRON\", \"TIBC\", \"FERRITIN\"    No results found for: \"FOLATE\"    No results found for: \"OCCULTBLD\"    No results found for: \"RETICCTPCT\"  No results found for: \"XTICXXNR51\"  No results found for: \"SPEP\", \"UPEP\"  No results found for: \"LDH\", \"URICACID\"  No results found for: \"LEO\", \"RF\", \"SEDRATE\"  No results found for: \"FIBRINOGEN\", \"HAPTOGLOBIN\"  Lab Results   Component Value Date    PTT 28.6 08/29/2023    INR 0.94 08/29/2023     No results found for: \"\"  No results found for: \"CEA\"  No components found for: \"CA-19-9\"  No results found for: \"PSA\"  No results found for: \"SEDRATE\"       Assessment & Plan     Patient is a 73-year-old male with esophageal adenocarcinoma of the distal esophagus.    Esophageal adenocarcinoma  Given CT imaging there is concern for metastatic lymph node in the mesentery  PET/CT with hypermetabolic lymph node, metastasis in the lumbar spine L4  Case discussed at our multidisciplinary tumor board conference    S/p biopsy of spinal lesion with metastatic disease.   Discussed starting treatment with FOLFOX opdivo given negative HER and NGS profile though PDL1 is low " CPS 3  Started FOLFOX had significant chemo-induced nausea  Now improved added emend   Clinical improvement with swallowing  Increased fatigue, myelosupression decrease oxaliplatin to 65 mg/m2  CT imaging with good response to treatment  Continue same regimen    Chemotherapy induced nausea vomiting  Patient is on emend continue same    Poor appetite  Continues to have taste changes weight is stable    Myelosuppression  Discontinue 5-FU push    Thank you very much for providing the opportunity to participate in this patient’s care. Please do not hesitate to call if there are any other questions.    F/u in 2 weeks    Time spent on encounter including record review, history taking, exam, discussion, counseling and documentation at: 40 minutes

## 2023-11-27 NOTE — PROGRESS NOTES
Patient is here for C6D1 opdivo, oxaliplatin, 5FU. Port accessed and flushed with good blood return noted. 10cc of blood wasted prior to specimen collection. Blood specimen obtained and sent to lab for processing per protocol. Patient is still having the mucous but it is coming up easier with the mucinex and he is not having the trouble breathing with it as long as he takes the mucinex. Dr. Benito notified of the mucous and Patients platelets being 81 and ANC 1.42. Dr. Benito verbally stated to discontinue the 5FU push this treatment and going forward. Dr. Benito also said it is ok to treat with his platelets and ANC if the STAT CMP is ok. Dr. Benito sent the STAT CMP results and he stated it was ok to treat. Patient tolerated treatment and was discharged with AVS.

## 2023-11-29 ENCOUNTER — HOSPITAL ENCOUNTER (OUTPATIENT)
Dept: ONCOLOGY | Facility: HOSPITAL | Age: 73
Discharge: HOME | End: 2023-11-29
Payer: MEDICARE

## 2023-11-29 DIAGNOSIS — K22.89 ESOPHAGEAL MASS: Primary | ICD-10-CM

## 2023-11-29 PROCEDURE — 25810000003 SODIUM CHLORIDE 0.9 % SOLUTION: Performed by: INTERNAL MEDICINE

## 2023-11-29 PROCEDURE — 25010000002 HEPARIN LOCK FLUSH PER 10 UNITS: Performed by: INTERNAL MEDICINE

## 2023-11-29 PROCEDURE — 96360 HYDRATION IV INFUSION INIT: CPT

## 2023-11-29 RX ORDER — SODIUM CHLORIDE 9 MG/ML
1000 INJECTION, SOLUTION INTRAVENOUS ONCE
Status: COMPLETED | OUTPATIENT
Start: 2023-11-29 | End: 2023-11-29

## 2023-11-29 RX ORDER — SODIUM CHLORIDE 0.9 % (FLUSH) 0.9 %
20 SYRINGE (ML) INJECTION AS NEEDED
Status: DISCONTINUED | OUTPATIENT
Start: 2023-11-29 | End: 2023-11-30 | Stop reason: HOSPADM

## 2023-11-29 RX ORDER — SODIUM CHLORIDE 0.9 % (FLUSH) 0.9 %
20 SYRINGE (ML) INJECTION AS NEEDED
OUTPATIENT
Start: 2023-11-29

## 2023-11-29 RX ORDER — HEPARIN SODIUM (PORCINE) LOCK FLUSH IV SOLN 100 UNIT/ML 100 UNIT/ML
500 SOLUTION INTRAVENOUS AS NEEDED
Status: DISCONTINUED | OUTPATIENT
Start: 2023-11-29 | End: 2023-11-30 | Stop reason: HOSPADM

## 2023-11-29 RX ORDER — HEPARIN SODIUM (PORCINE) LOCK FLUSH IV SOLN 100 UNIT/ML 100 UNIT/ML
500 SOLUTION INTRAVENOUS AS NEEDED
OUTPATIENT
Start: 2023-11-29

## 2023-11-29 RX ADMIN — Medication 20 ML: at 14:14

## 2023-11-29 RX ADMIN — SODIUM CHLORIDE 1000 ML/HR: 9 INJECTION, SOLUTION INTRAVENOUS at 13:05

## 2023-11-29 RX ADMIN — HEPARIN 500 UNITS: 100 SYRINGE at 14:15

## 2023-11-30 ENCOUNTER — TELEPHONE (OUTPATIENT)
Dept: ONCOLOGY | Facility: CLINIC | Age: 73
End: 2023-11-30

## 2023-11-30 NOTE — TELEPHONE ENCOUNTER
Spoke w/ pt and since he called us he has had a BM and feels better.  Nothing further needed at this time.   299.1

## 2023-12-13 ENCOUNTER — OFFICE VISIT (OUTPATIENT)
Dept: ONCOLOGY | Facility: CLINIC | Age: 73
End: 2023-12-13
Payer: MEDICARE

## 2023-12-13 ENCOUNTER — HOSPITAL ENCOUNTER (OUTPATIENT)
Dept: ONCOLOGY | Facility: HOSPITAL | Age: 73
Discharge: HOME | End: 2023-12-13
Admitting: INTERNAL MEDICINE
Payer: MEDICARE

## 2023-12-13 VITALS — DIASTOLIC BLOOD PRESSURE: 66 MMHG | HEART RATE: 92 BPM | TEMPERATURE: 97.5 F | SYSTOLIC BLOOD PRESSURE: 101 MMHG

## 2023-12-13 VITALS
RESPIRATION RATE: 16 BRPM | DIASTOLIC BLOOD PRESSURE: 66 MMHG | OXYGEN SATURATION: 97 % | TEMPERATURE: 97.5 F | HEART RATE: 92 BPM | SYSTOLIC BLOOD PRESSURE: 101 MMHG

## 2023-12-13 DIAGNOSIS — C15.9 ESOPHAGEAL ADENOCARCINOMA: ICD-10-CM

## 2023-12-13 DIAGNOSIS — E86.0 DEHYDRATION: Primary | ICD-10-CM

## 2023-12-13 DIAGNOSIS — R05.9 COUGH, UNSPECIFIED TYPE: ICD-10-CM

## 2023-12-13 DIAGNOSIS — K22.89 ESOPHAGEAL MASS: ICD-10-CM

## 2023-12-13 LAB
ALBUMIN SERPL-MCNC: 3 G/DL (ref 3.5–5.2)
ALBUMIN/GLOB SERPL: 1 G/DL
ALP SERPL-CCNC: 86 U/L (ref 39–117)
ALT SERPL W P-5'-P-CCNC: 26 U/L (ref 1–41)
ANION GAP SERPL CALCULATED.3IONS-SCNC: 10 MMOL/L (ref 5–15)
AST SERPL-CCNC: 60 U/L (ref 1–40)
BASOPHILS # BLD AUTO: 0.01 10*3/MM3 (ref 0–0.2)
BASOPHILS NFR BLD AUTO: 0.4 % (ref 0–1.5)
BILIRUB SERPL-MCNC: 0.4 MG/DL (ref 0–1.2)
BUN SERPL-MCNC: 25 MG/DL (ref 8–23)
BUN/CREAT SERPL: 35.7 (ref 7–25)
CALCIUM SPEC-SCNC: 8.3 MG/DL (ref 8.6–10.5)
CHLORIDE SERPL-SCNC: 101 MMOL/L (ref 98–107)
CO2 SERPL-SCNC: 24 MMOL/L (ref 22–29)
CREAT SERPL-MCNC: 0.7 MG/DL (ref 0.76–1.27)
DEPRECATED RDW RBC AUTO: 56.1 FL (ref 37–54)
EGFRCR SERPLBLD CKD-EPI 2021: 97.9 ML/MIN/1.73
EOSINOPHIL # BLD AUTO: 0.01 10*3/MM3 (ref 0–0.4)
EOSINOPHIL NFR BLD AUTO: 0.4 % (ref 0.3–6.2)
ERYTHROCYTE [DISTWIDTH] IN BLOOD BY AUTOMATED COUNT: 18.3 % (ref 12.3–15.4)
GLOBULIN UR ELPH-MCNC: 3 GM/DL
GLUCOSE SERPL-MCNC: 98 MG/DL (ref 65–99)
HCT VFR BLD AUTO: 37 % (ref 37.5–51)
HGB BLD-MCNC: 12 G/DL (ref 13–17.7)
LYMPHOCYTES # BLD AUTO: 0.78 10*3/MM3 (ref 0.7–3.1)
LYMPHOCYTES NFR BLD AUTO: 32.5 % (ref 19.6–45.3)
MCH RBC QN AUTO: 28.8 PG (ref 26.6–33)
MCHC RBC AUTO-ENTMCNC: 32.4 G/DL (ref 31.5–35.7)
MCV RBC AUTO: 88.7 FL (ref 79–97)
MONOCYTES # BLD AUTO: 0.43 10*3/MM3 (ref 0.1–0.9)
MONOCYTES NFR BLD AUTO: 17.9 % (ref 5–12)
NEUTROPHILS NFR BLD AUTO: 1.17 10*3/MM3 (ref 1.7–7)
NEUTROPHILS NFR BLD AUTO: 48.8 % (ref 42.7–76)
PLATELET # BLD AUTO: 65 10*3/MM3 (ref 140–450)
PMV BLD AUTO: 12 FL (ref 6–12)
POTASSIUM SERPL-SCNC: 3.9 MMOL/L (ref 3.5–5.2)
PROT SERPL-MCNC: 6 G/DL (ref 6–8.5)
RBC # BLD AUTO: 4.17 10*6/MM3 (ref 4.14–5.8)
SODIUM SERPL-SCNC: 135 MMOL/L (ref 136–145)
WBC NRBC COR # BLD AUTO: 2.4 10*3/MM3 (ref 3.4–10.8)

## 2023-12-13 PROCEDURE — 96360 HYDRATION IV INFUSION INIT: CPT

## 2023-12-13 PROCEDURE — 25810000003 SODIUM CHLORIDE 0.9 % SOLUTION: Performed by: INTERNAL MEDICINE

## 2023-12-13 PROCEDURE — 85025 COMPLETE CBC W/AUTO DIFF WBC: CPT

## 2023-12-13 PROCEDURE — 80053 COMPREHEN METABOLIC PANEL: CPT | Performed by: PHYSICIAN ASSISTANT

## 2023-12-13 PROCEDURE — 25010000002 HEPARIN LOCK FLUSH PER 10 UNITS: Performed by: INTERNAL MEDICINE

## 2023-12-13 RX ORDER — HEPARIN SODIUM (PORCINE) LOCK FLUSH IV SOLN 100 UNIT/ML 100 UNIT/ML
500 SOLUTION INTRAVENOUS AS NEEDED
Status: DISCONTINUED | OUTPATIENT
Start: 2023-12-13 | End: 2023-12-14 | Stop reason: HOSPADM

## 2023-12-13 RX ORDER — SODIUM CHLORIDE 0.9 % (FLUSH) 0.9 %
20 SYRINGE (ML) INJECTION AS NEEDED
Status: DISCONTINUED | OUTPATIENT
Start: 2023-12-13 | End: 2023-12-14 | Stop reason: HOSPADM

## 2023-12-13 RX ORDER — SODIUM CHLORIDE 0.9 % (FLUSH) 0.9 %
20 SYRINGE (ML) INJECTION AS NEEDED
OUTPATIENT
Start: 2023-12-13

## 2023-12-13 RX ORDER — HEPARIN SODIUM (PORCINE) LOCK FLUSH IV SOLN 100 UNIT/ML 100 UNIT/ML
500 SOLUTION INTRAVENOUS AS NEEDED
OUTPATIENT
Start: 2023-12-13

## 2023-12-13 RX ADMIN — Medication 20 ML: at 15:42

## 2023-12-13 RX ADMIN — SODIUM CHLORIDE 1000 ML: 9 INJECTION, SOLUTION INTRAVENOUS at 14:36

## 2023-12-13 RX ADMIN — HEPARIN 500 UNITS: 100 SYRINGE at 15:42

## 2023-12-13 NOTE — PROGRESS NOTES
HEMATOLOGY ONCOLOGY OUTPATIENT FOLLOW UP       Patient name: Aravind Lim  : 1950  MRN: [id number]  Primary Care Physician: Abiodun Steele MD  Referring Physician: No ref. provider found  Reason For Consult: Esophageal cancer      History of Present Illness:  Patient is a 73 y.o. male with recently diagnosed esophageal adenocarcinoma.    Patient has been having difficulty swallowing for the 5 months prior to presentation this is been progressing gradually.  Patient has been an active smoker with 50-pack-year smoking history.  He denies significant alcohol intake or GERD history.  His family history is positive for malignancy and 2 sisters and one nephew but he is unsure of the type of malignancy.  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  Enlarged mesenteric lymph node    2023 -PET/CT with large distal esophageal mass extending to the digastric cardia compatible with neoplasm.  Hypermetabolic node in the right thoracic inlet compatible with metastatic disease.  Bone lesion of L4 is most compatible with metastatic disease.  Largest lung nodule was not hypermetabolic likely benign process  CARIS NGS negative for HER 2, low TMB, FGFR2 amplified, PDL1 CPS 3      23 - CT guided biopsy of L4 - metastatic poorly differentiated carcinoma. Likely from esophageal primary.    2023 cycle 1 FOLFOX Opdivo  10/16/2023 - C 3 FOLFOX Opdivo.  10/30/2023 - C4 FOLFOX opdivo  23 - C5 FOLFOX opdivo decrease dose of oxaliplatin to 65 mg/m2  Continues to be on FOLFOX Opdivo  2023 -CT chest abdomen pelvis with decrease in size of the right  supraclavicular and right paratracheal lymphadenopathy.  Circumferential masslike thickening of the distal esophagus consistent with the known primary.  Stable central mesenteric lymphadenopathy.  Increased sclerosis of the L4 vertebral body lesion indicating posttreatment change.  Cholelithiasis.    11/27/2023: C6 FOLFOX/nivolumab. D/C 5-FU push    Subjective:  Patient seen as an add on due to increased fatigue following his cycle 6 infusion. Patient reports that he started to have increased fatigue on 11/30 and he felt like he had the flu for about 10 days following. He is not sure if he had a fever. He did not have any Flu or COVID testing. He states that his wife was also feeling poorly during this time. She was admitted yesterday, 12/12, for pneumonia (per patient is flu positive). Today he states he is starting to feel better, not quite to baseline. His appetite and energy levels are improving. He denies worsening cough, hemoptysis, chest pain, nvd, headache. Denies dysphagia or sore throat.    Past Medical History:   Diagnosis Date    Anemia 08/04/2023    Anxiety 06/07/2022    Bilateral cataracts 06/23/2023    Colon polyp     Constipation 05/03/2022    BM daily w/ mineral oil.    COPD (chronic obstructive pulmonary disease)     COVID-19 08/16/2022    Dysphagia     Fatigue 05/03/2022    GERD (gastroesophageal reflux disease)     History of hiatal hernia     Impacted cerumen of right ear 05/03/2022    Prediabetes 05/10/2022       No past surgical history on file.      Current Outpatient Medications:     albuterol sulfate  (90 Base) MCG/ACT inhaler, Inhale 2 puffs Every 4 (Four) Hours As Needed., Disp: , Rfl:     budesonide-formoterol (SYMBICORT) 160-4.5 MCG/ACT inhaler, Inhale 2 puffs 2 (Two) Times a Day., Disp: , Rfl:     Cholecalciferol 50 MCG (2000 UT) tablet, Take 1 tablet by mouth Daily. (Patient not taking: Reported on 10/2/2023), Disp: , Rfl:     lidocaine-prilocaine (EMLA) 2.5-2.5 % cream, Apply  1 application  topically to the appropriate area as directed As Needed (apply 30-60 minutes prior to port access)., Disp: 30 g, Rfl: 2    omeprazole (priLOSEC) 40 MG capsule, Take 1 capsule by mouth Every Morning. (Patient not taking: Reported on 10/2/2023), Disp: , Rfl:     ondansetron (ZOFRAN) 8 MG tablet, Take 1 tablet by mouth 3 (Three) Times a Day As Needed for Nausea or Vomiting., Disp: 30 tablet, Rfl: 5    Sorbitol 70 % solution, TAKE 100 ML BY MOUTH as directed FOR ONE DAY (Patient not taking: Reported on 8/21/2023), Disp: , Rfl:     vitamin B-12 (CYANOCOBALAMIN) 1000 MCG tablet, Take 1 tablet by mouth Daily., Disp: , Rfl:   No current facility-administered medications for this visit.    Facility-Administered Medications Ordered in Other Visits:     heparin injection 500 Units, 500 Units, Intravenous, PRDAYSI, Sophie Benito MD, 500 Units at 12/13/23 1542    sodium chloride 0.9 % flush 20 mL, 20 mL, Intravenous, PRN, Sophie Bneito MD, 20 mL at 12/13/23 1542    No Known Allergies    Family History   Problem Relation Age of Onset    Diabetes Mother     Diabetes Father     Cancer Sister         Unknown of type       Cancer-related family history includes Cancer in his sister.      Social History     Tobacco Use    Smoking status: Every Day     Packs/day: 1     Types: Cigarettes   Vaping Use    Vaping Use: Never used   Substance Use Topics    Alcohol use: Never    Drug use: Never     Social History     Social History Narrative    Not on file       Objective:    Vital Signs:    Vitals:    12/13/23 1500   BP: 101/66   Pulse: 92   Resp: 16   Temp: 97.5 °F (36.4 °C)   TempSrc: Oral   SpO2: 97%         There is no height or weight on file to calculate BMI.    ECOG  (1) Restricted in physically strenuous activity, ambulatory and able to do work of light nature    Physical Exam:   Physical Exam  Constitutional:       General: He is not in acute distress.     Appearance: Normal appearance.   HENT:      Head: Normocephalic  and atraumatic.   Eyes:      Pupils: Pupils are equal, round, and reactive to light.   Cardiovascular:      Rate and Rhythm: Normal rate and regular rhythm.      Pulses: Normal pulses.      Heart sounds: No murmur heard.  Pulmonary:      Effort: Pulmonary effort is normal.      Breath sounds: Normal breath sounds. No rhonchi.   Chest:      Chest wall: No tenderness.   Abdominal:      General: There is no distension.      Palpations: Abdomen is soft. There is no mass.      Tenderness: There is no abdominal tenderness.   Musculoskeletal:         General: No swelling. Normal range of motion.      Cervical back: Normal range of motion and neck supple.   Skin:     General: Skin is warm.      Findings: No bruising.   Neurological:      General: No focal deficit present.      Mental Status: He is alert and oriented to person, place, and time.   Psychiatric:         Mood and Affect: Mood normal.         Lab Results - Last 18 Months   Lab Units 12/13/23  1456 11/27/23  0912 11/13/23  0810   WBC 10*3/mm3 2.40* 3.19* 3.46   HEMOGLOBIN g/dL 12.0* 11.6* 11.5*   HEMATOCRIT % 37.0* 35.5* 35.4*   PLATELETS 10*3/mm3 65* 81* 92*   MCV fL 88.7 88.8 87.4     Lab Results - Last 18 Months   Lab Units 11/27/23  0912 11/13/23  0810 10/30/23  0839   SODIUM mmol/L 132* 130* 130*   POTASSIUM mmol/L 4.2 4.2 4.5   CHLORIDE mmol/L 96* 93* 96*   CO2 mmol/L 26.0 26.0 23.0   BUN mg/dL 14 12 13   CREATININE mg/dL 0.78 0.68* 0.63*   CALCIUM mg/dL 9.4 9.7 9.2   BILIRUBIN mg/dL 0.3 0.3 0.2   ALK PHOS U/L 95 98 90   ALT (SGPT) U/L 40 53* 16   AST (SGOT) U/L 48* 52* 21   GLUCOSE mg/dL 150* 169* 112*       Lab Results   Component Value Date    GLUCOSE 150 (H) 11/27/2023    BUN 14 11/27/2023    CREATININE 0.78 11/27/2023    BCR 17.9 11/27/2023    K 4.2 11/27/2023    CO2 26.0 11/27/2023    CALCIUM 9.4 11/27/2023    ALBUMIN 3.8 11/27/2023    AST 48 (H) 11/27/2023    ALT 40 11/27/2023       Lab Results - Last 18 Months   Lab Units 08/29/23  1031   INR  0.94  "  APTT seconds 28.6       No results found for: \"IRON\", \"TIBC\", \"FERRITIN\"    No results found for: \"FOLATE\"    No results found for: \"OCCULTBLD\"    No results found for: \"RETICCTPCT\"  No results found for: \"[id number]\"  No results found for: \"SPEP\", \"UPEP\"  No results found for: \"LDH\", \"URICACID\"  No results found for: \"LEO\", \"RF\", \"SEDRATE\"  No results found for: \"FIBRINOGEN\", \"HAPTOGLOBIN\"  Lab Results   Component Value Date    PTT 28.6 08/29/2023    INR 0.94 08/29/2023     No results found for: \"\"  No results found for: \"CEA\"  No components found for: \"CA-19-9\"  No results found for: \"PSA\"  No results found for: \"SEDRATE\"       Assessment & Plan     Patient is a 73-year-old male with esophageal adenocarcinoma of the distal esophagus.    Esophageal adenocarcinoma  Given CT imaging there is concern for metastatic lymph node in the mesentery  PET/CT with hypermetabolic lymph node, metastasis in the lumbar spine L4  Case discussed at our multidisciplinary tumor board conference    S/p biopsy of spinal lesion with metastatic disease.   Discussed starting treatment with FOLFOX opdivo given negative HER and NGS profile though PDL1 is low CPS 3  Started FOLFOX had significant chemo-induced nausea  Now improved added emend   Clinical improvement with swallowing  Increased fatigue, myelosupression decrease oxaliplatin to 65 mg/m2  CT imaging with good response to treatment  Continue same regimen - Most recent infusion 11/27/2023. Next due 12/18/2023.    Chemotherapy induced nausea vomiting  Patient is on emend continue same    Poor appetite  Continues to have taste changes   This is improving.    Myelosuppression  Discontinue 5-FU push  Discussed neutropenic and thrombocytopenic precautions.     Fatigue  Patient reports improvement in fatigue - 1 L NS fluids given today. Continue to monitor.     Cough  Patient reports as chronic. Uses inhalers and Mucinex with relief. Reports no changes, is not worse, and he is not " SOA. Lungs CTA. Will continue to monitor.       Patient is scheduled for C7 FOLFOX/Nivolumab on 12/18/2023. Will follow up at that time.     Alda Au PA-C    Time spent on encounter including record review, history taking, exam, discussion, counseling and documentation at: 30 minutes

## 2023-12-13 NOTE — PROGRESS NOTES
Patient came in needing IVFs. 1L ns given over 1 hour. Patient assessed by RICO Lizama. No new orders at this time. Patient denies further needs today, AVS given.

## 2023-12-14 NOTE — PROGRESS NOTES
HEMATOLOGY ONCOLOGY OUTPATIENT FOLLOW UP       Patient name: Aravind Lim  : 1950  MRN: [id number]  Primary Care Physician: Abiodun Steele MD  Referring Physician: Abiodun Steele MD  Reason For Consult: Esophageal cancer      History of Present Illness:  Patient is a 73 y.o. male with recently diagnosed esophageal adenocarcinoma.    Patient has been having difficulty swallowing for the 5 months prior to presentation this is been progressing gradually.  Patient has been an active smoker with 50-pack-year smoking history.  He denies significant alcohol intake or GERD history.  His family history is positive for malignancy and 2 sisters and one nephew but he is unsure of the type of malignancy.  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  Enlarged mesenteric lymph node    2023 -PET/CT with large distal esophageal mass extending to the digastric cardia compatible with neoplasm.  Hypermetabolic node in the right thoracic inlet compatible with metastatic disease.  Bone lesion of L4 is most compatible with metastatic disease.  Largest lung nodule was not hypermetabolic likely benign process  CARIS NGS negative for HER 2, low TMB, FGFR2 amplified, PDL1 CPS 3      23 - CT guided biopsy of L4 - metastatic poorly differentiated carcinoma. Likely from esophageal primary.    2023 cycle 1 FOLFOX Opdivo  10/16/2023 - C 3 FOLFOX Opdivo.  10/30/2023 - C4 FOLFOX opdivo  23 - C5 FOLFOX opdivo decrease dose of oxaliplatin to 65 mg/m2  Continues to be on FOLFOX Opdivo  2023 -CT chest abdomen pelvis with decrease in size of the right supraclavicular  "and right paratracheal lymphadenopathy.  Circumferential masslike thickening of the distal esophagus consistent with the known primary.  Stable central mesenteric lymphadenopathy.  Increased sclerosis of the L4 vertebral body lesion indicating posttreatment change.  Cholelithiasis.    11/27/2023: C6 FOLFOX/nivolumab. D/C 5-FU push    Subjective:  Patient seen today in follow up prior to treatment. He is scheduled for C7 FOLFOX/nivolumab. Patient reports continued improvement in fatigue and URI symptoms. He denies SOA, chest pain, dysphagia, fever, neuropathy. He does have poor appetite - states \"things don't taste good\". He is trying to eat more now and is supplementing with protein drinks. Otherwise no new complaints.     Past Medical History:   Diagnosis Date    Anemia 08/04/2023    Anxiety 06/07/2022    Bilateral cataracts 06/23/2023    Colon polyp     Constipation 05/03/2022    BM daily w/ mineral oil.    COPD (chronic obstructive pulmonary disease)     COVID-19 08/16/2022    Dysphagia     Fatigue 05/03/2022    GERD (gastroesophageal reflux disease)     History of hiatal hernia     Impacted cerumen of right ear 05/03/2022    Prediabetes 05/10/2022       No past surgical history on file.      Current Outpatient Medications:     albuterol sulfate  (90 Base) MCG/ACT inhaler, Inhale 2 puffs Every 4 (Four) Hours As Needed., Disp: , Rfl:     budesonide-formoterol (SYMBICORT) 160-4.5 MCG/ACT inhaler, Inhale 2 puffs 2 (Two) Times a Day., Disp: , Rfl:     Cholecalciferol 50 MCG (2000 UT) tablet, Take 1 tablet by mouth Daily. (Patient not taking: Reported on 10/2/2023), Disp: , Rfl:     lidocaine-prilocaine (EMLA) 2.5-2.5 % cream, Apply 1 application  topically to the appropriate area as directed As Needed (apply 30-60 minutes prior to port access)., Disp: 30 g, Rfl: 2    omeprazole (priLOSEC) 40 MG capsule, Take 1 capsule by mouth Every Morning. (Patient not taking: Reported on 10/2/2023), Disp: , Rfl:     " "ondansetron (ZOFRAN) 8 MG tablet, Take 1 tablet by mouth 3 (Three) Times a Day As Needed for Nausea or Vomiting., Disp: 30 tablet, Rfl: 5    vitamin B-12 (CYANOCOBALAMIN) 1000 MCG tablet, Take 1 tablet by mouth Daily., Disp: , Rfl:   No current facility-administered medications for this visit.    Facility-Administered Medications Ordered in Other Visits:     dexAMETHasone (DECADRON) IVPB 12 mg, 12 mg, Intravenous, Once, Sophie Benito MD    dextrose (D5W) 5 % infusion 250 mL, 250 mL, Intravenous, Once, Sophie Benito MD    fluorouracil (ADRUCIL) 4,200 mg in sodium chloride 0.9 % 94 mL chemo infusion - FOR HOME USE, 4,200 mg, Intravenous, Once, Sophie Benito MD    FOSAPREPITANT 150 MG/100ML NORMAL SALINE (CBC) IVPB 100 mL 100 mL, 150 mg, Intravenous, Once, Sophie Benito MD    leucovorin 700 mg in dextrose (D5W) 5 % 285 mL IVPB, 700 mg, Intravenous, Once, Sophie Benito MD    Nivolumab (OPDIVO) 240 mg in sodium chloride 0.9 % 124 mL chemo IVPB, 240 mg, Intravenous, Once, Sophie Benito MD    OXALIplatin (ELOXATIN) 114 mg in dextrose (D5W) 5 % 272.8 mL chemo IVPB, 114 mg, Intravenous, Once, Sophie Benito MD    palonosetron (ALOXI) injection 0.25 mg, 0.25 mg, Intravenous, Once, Sophie Benito MD    No Known Allergies    Family History   Problem Relation Age of Onset    Diabetes Mother     Diabetes Father     Cancer Sister         Unknown of type       Cancer-related family history includes Cancer in his sister.      Social History     Tobacco Use    Smoking status: Every Day     Packs/day: 1     Types: Cigarettes   Vaping Use    Vaping Use: Never used   Substance Use Topics    Alcohol use: Never    Drug use: Never     Social History     Social History Narrative    Not on file       Objective:    Vital Signs:    Vitals:    12/18/23 0815   BP: 105/67   Pulse: 111   Resp: 16   Temp: 97.5 °F (36.4 °C)   TempSrc: Oral   SpO2: 94%   Weight: 56.2 kg (124 lb)   Height: 185.4 cm (73\")   PainSc: 0-No pain           Body mass index is " 16.36 kg/m².    ECOG  (1) Restricted in physically strenuous activity, ambulatory and able to do work of light nature    Physical Exam:   Physical Exam  Constitutional:       Appearance: Normal appearance.   HENT:      Head: Normocephalic and atraumatic.      Mouth/Throat:      Mouth: Mucous membranes are moist.   Eyes:      Pupils: Pupils are equal, round, and reactive to light.   Cardiovascular:      Rate and Rhythm: Normal rate and regular rhythm.      Pulses: Normal pulses.      Heart sounds: No murmur heard.  Pulmonary:      Effort: Pulmonary effort is normal. No respiratory distress.      Breath sounds: Normal breath sounds. No rhonchi.   Abdominal:      General: There is no distension.      Palpations: Abdomen is soft. There is no mass.      Tenderness: There is no abdominal tenderness.   Musculoskeletal:         General: Normal range of motion.      Cervical back: Normal range of motion and neck supple.   Skin:     General: Skin is warm.      Coloration: Skin is not pale.      Findings: No rash.   Neurological:      General: No focal deficit present.      Mental Status: He is alert and oriented to person, place, and time.      Motor: No weakness.   Psychiatric:         Mood and Affect: Mood normal.         Lab Results - Last 18 Months   Lab Units 12/18/23  0815 12/13/23  1456 11/27/23  0912   WBC 10*3/mm3 5.08 2.40* 3.19*   HEMOGLOBIN g/dL 12.3* 12.0* 11.6*   HEMATOCRIT % 38.0 37.0* 35.5*   PLATELETS 10*3/mm3 170 65* 81*   MCV fL 88.2 88.7 88.8     Lab Results - Last 18 Months   Lab Units 12/13/23  1456 11/27/23  0912 11/13/23  0810   SODIUM mmol/L 135* 132* 130*   POTASSIUM mmol/L 3.9 4.2 4.2   CHLORIDE mmol/L 101 96* 93*   CO2 mmol/L 24.0 26.0 26.0   BUN mg/dL 25* 14 12   CREATININE mg/dL 0.70* 0.78 0.68*   CALCIUM mg/dL 8.3* 9.4 9.7   BILIRUBIN mg/dL 0.4 0.3 0.3   ALK PHOS U/L 86 95 98   ALT (SGPT) U/L 26 40 53*   AST (SGOT) U/L 60* 48* 52*   GLUCOSE mg/dL 98 150* 169*       Lab Results   Component Value  "Date    GLUCOSE 98 12/13/2023    BUN 25 (H) 12/13/2023    CREATININE 0.70 (L) 12/13/2023    BCR 35.7 (H) 12/13/2023    K 3.9 12/13/2023    CO2 24.0 12/13/2023    CALCIUM 8.3 (L) 12/13/2023    ALBUMIN 3.0 (L) 12/13/2023    AST 60 (H) 12/13/2023    ALT 26 12/13/2023       Lab Results - Last 18 Months   Lab Units 08/29/23  1031   INR  0.94   APTT seconds 28.6       No results found for: \"IRON\", \"TIBC\", \"FERRITIN\"    No results found for: \"FOLATE\"    No results found for: \"OCCULTBLD\"    No results found for: \"RETICCTPCT\"  No results found for: \"HWZJBBIH92\"  No results found for: \"SPEP\", \"UPEP\"  No results found for: \"LDH\", \"URICACID\"  No results found for: \"LEO\", \"RF\", \"SEDRATE\"  No results found for: \"FIBRINOGEN\", \"HAPTOGLOBIN\"  Lab Results   Component Value Date    PTT 28.6 08/29/2023    INR 0.94 08/29/2023     No results found for: \"\"  No results found for: \"CEA\"  No components found for: \"CA-19-9\"  No results found for: \"PSA\"  No results found for: \"SEDRATE\"       Assessment & Plan     Patient is a 73-year-old male with esophageal adenocarcinoma of the distal esophagus.    Esophageal adenocarcinoma  Given CT imaging there is concern for metastatic lymph node in the mesentery  PET/CT with hypermetabolic lymph node, metastasis in the lumbar spine L4  Case discussed at our multidisciplinary tumor board conference    S/p biopsy of spinal lesion with metastatic disease.   Discussed starting treatment with FOLFOX opdivo given negative HER and NGS profile though PDL1 is low CPS 3  Started FOLFOX had significant chemo-induced nausea  Now improved added emend   Clinical improvement with swallowing  Increased fatigue, myelosupression decrease oxaliplatin to 65 mg/m2  CT imaging with good response to treatment - completed 11/22/2023.  Continue same regimen - FOLFOX/nivolumab. Dose adjusted for weight change.     Chemotherapy induced nausea vomiting  Patient is on emend continue same    Poor appetite  Continues to have " taste changes  - is supplementing with Boost/Ensure.   Will have dietitian meet with patient as well    Myelosuppression  Discontinue 5-FU push  Discussed neutropenic and thrombocytopenic precautions.   Continue to monitor    Fatigue  Patient reports improvement in fatigue - 1 L NS fluids given today. Continue to monitor.     Cough  Patient reports as chronic. Uses inhalers and Mucinex with relief. Reports no changes, is not worse, and he is not SOA. Lungs CTA. Continue to monitor.      Continue with current regimen.   Follow up in 2 weeks with Dr. Benito with next expected cycle, sooner if condition indicates    Alda Au PA-C        Time spent on encounter including record review, history taking, exam, discussion, counseling and documentation at:

## 2023-12-18 ENCOUNTER — HOSPITAL ENCOUNTER (OUTPATIENT)
Dept: ONCOLOGY | Facility: HOSPITAL | Age: 73
Discharge: HOME | End: 2023-12-18
Admitting: INTERNAL MEDICINE
Payer: MEDICARE

## 2023-12-18 ENCOUNTER — OFFICE VISIT (OUTPATIENT)
Dept: ONCOLOGY | Facility: CLINIC | Age: 73
End: 2023-12-18
Payer: MEDICARE

## 2023-12-18 VITALS
DIASTOLIC BLOOD PRESSURE: 67 MMHG | OXYGEN SATURATION: 94 % | HEART RATE: 111 BPM | SYSTOLIC BLOOD PRESSURE: 105 MMHG | RESPIRATION RATE: 16 BRPM | BODY MASS INDEX: 16.44 KG/M2 | WEIGHT: 124 LBS | HEIGHT: 73 IN | TEMPERATURE: 97.5 F

## 2023-12-18 VITALS
BODY MASS INDEX: 16.44 KG/M2 | RESPIRATION RATE: 16 BRPM | HEIGHT: 73 IN | TEMPERATURE: 97.5 F | OXYGEN SATURATION: 94 % | WEIGHT: 124 LBS | DIASTOLIC BLOOD PRESSURE: 67 MMHG | HEART RATE: 111 BPM | SYSTOLIC BLOOD PRESSURE: 105 MMHG

## 2023-12-18 DIAGNOSIS — C15.9 ESOPHAGEAL ADENOCARCINOMA: Primary | ICD-10-CM

## 2023-12-18 DIAGNOSIS — K22.89 ESOPHAGEAL MASS: Primary | ICD-10-CM

## 2023-12-18 DIAGNOSIS — R05.9 COUGH, UNSPECIFIED TYPE: ICD-10-CM

## 2023-12-18 LAB
ALBUMIN SERPL-MCNC: 3.4 G/DL (ref 3.5–5.2)
ALBUMIN/GLOB SERPL: 1.1 G/DL
ALP SERPL-CCNC: 111 U/L (ref 39–117)
ALT SERPL W P-5'-P-CCNC: 29 U/L (ref 1–41)
ANION GAP SERPL CALCULATED.3IONS-SCNC: 12 MMOL/L (ref 5–15)
AST SERPL-CCNC: 53 U/L (ref 1–40)
BASOPHILS # BLD AUTO: 0.01 10*3/MM3 (ref 0–0.2)
BASOPHILS NFR BLD AUTO: 0.2 % (ref 0–1.5)
BILIRUB SERPL-MCNC: 0.5 MG/DL (ref 0–1.2)
BUN SERPL-MCNC: 10 MG/DL (ref 8–23)
BUN/CREAT SERPL: 16.1 (ref 7–25)
CALCIUM SPEC-SCNC: 9.1 MG/DL (ref 8.6–10.5)
CHLORIDE SERPL-SCNC: 103 MMOL/L (ref 98–107)
CO2 SERPL-SCNC: 25 MMOL/L (ref 22–29)
CREAT SERPL-MCNC: 0.62 MG/DL (ref 0.76–1.27)
DEPRECATED RDW RBC AUTO: 58.4 FL (ref 37–54)
EGFRCR SERPLBLD CKD-EPI 2021: 101.6 ML/MIN/1.73
EOSINOPHIL # BLD AUTO: 0.02 10*3/MM3 (ref 0–0.4)
EOSINOPHIL NFR BLD AUTO: 0.4 % (ref 0.3–6.2)
ERYTHROCYTE [DISTWIDTH] IN BLOOD BY AUTOMATED COUNT: 18.7 % (ref 12.3–15.4)
GLOBULIN UR ELPH-MCNC: 3.2 GM/DL
GLUCOSE BLDC GLUCOMTR-MCNC: 93 MG/DL (ref 70–105)
GLUCOSE SERPL-MCNC: 88 MG/DL (ref 65–99)
HCT VFR BLD AUTO: 38 % (ref 37.5–51)
HGB BLD-MCNC: 12.3 G/DL (ref 13–17.7)
LYMPHOCYTES # BLD AUTO: 1.16 10*3/MM3 (ref 0.7–3.1)
LYMPHOCYTES NFR BLD AUTO: 22.8 % (ref 19.6–45.3)
MCH RBC QN AUTO: 28.5 PG (ref 26.6–33)
MCHC RBC AUTO-ENTMCNC: 32.4 G/DL (ref 31.5–35.7)
MCV RBC AUTO: 88.2 FL (ref 79–97)
MONOCYTES # BLD AUTO: 0.89 10*3/MM3 (ref 0.1–0.9)
MONOCYTES NFR BLD AUTO: 17.5 % (ref 5–12)
NEUTROPHILS NFR BLD AUTO: 3 10*3/MM3 (ref 1.7–7)
NEUTROPHILS NFR BLD AUTO: 59.1 % (ref 42.7–76)
PLATELET # BLD AUTO: 170 10*3/MM3 (ref 140–450)
PMV BLD AUTO: 10.6 FL (ref 6–12)
POTASSIUM SERPL-SCNC: 3.5 MMOL/L (ref 3.5–5.2)
PROT SERPL-MCNC: 6.6 G/DL (ref 6–8.5)
RBC # BLD AUTO: 4.31 10*6/MM3 (ref 4.14–5.8)
SODIUM SERPL-SCNC: 140 MMOL/L (ref 136–145)
T4 FREE SERPL-MCNC: 1.78 NG/DL (ref 0.93–1.7)
TSH SERPL DL<=0.05 MIU/L-ACNC: 4.37 UIU/ML (ref 0.27–4.2)
WBC NRBC COR # BLD AUTO: 5.08 10*3/MM3 (ref 3.4–10.8)

## 2023-12-18 PROCEDURE — 82948 REAGENT STRIP/BLOOD GLUCOSE: CPT

## 2023-12-18 PROCEDURE — 96367 TX/PROPH/DG ADDL SEQ IV INF: CPT

## 2023-12-18 PROCEDURE — 80053 COMPREHEN METABOLIC PANEL: CPT | Performed by: INTERNAL MEDICINE

## 2023-12-18 PROCEDURE — 96368 THER/DIAG CONCURRENT INF: CPT

## 2023-12-18 PROCEDURE — 96413 CHEMO IV INFUSION 1 HR: CPT

## 2023-12-18 PROCEDURE — 84439 ASSAY OF FREE THYROXINE: CPT | Performed by: INTERNAL MEDICINE

## 2023-12-18 PROCEDURE — 0 DEXTROSE 5 % SOLUTION: Performed by: INTERNAL MEDICINE

## 2023-12-18 PROCEDURE — 25810000003 SODIUM CHLORIDE 0.9 % SOLUTION 250 ML FLEX CONT: Performed by: INTERNAL MEDICINE

## 2023-12-18 PROCEDURE — 96415 CHEMO IV INFUSION ADDL HR: CPT

## 2023-12-18 PROCEDURE — 0 DEXTROSE 5 % SOLUTION 250 ML FLEX CONT: Performed by: INTERNAL MEDICINE

## 2023-12-18 PROCEDURE — 25010000002 DEXAMETHASONE SODIUM PHOSPHATE 120 MG/30ML SOLUTION: Performed by: INTERNAL MEDICINE

## 2023-12-18 PROCEDURE — 25010000002 LEUCOVORIN CALCIUM PER 50 MG: Performed by: INTERNAL MEDICINE

## 2023-12-18 PROCEDURE — 25010000002 PALONOSETRON 0.25 MG/5ML SOLUTION PREFILLED SYRINGE: Performed by: INTERNAL MEDICINE

## 2023-12-18 PROCEDURE — 96416 CHEMO PROLONG INFUSE W/PUMP: CPT

## 2023-12-18 PROCEDURE — 25010000002 NIVOLUMAB 240 MG/24ML SOLUTION 24 ML VIAL: Performed by: INTERNAL MEDICINE

## 2023-12-18 PROCEDURE — 84443 ASSAY THYROID STIM HORMONE: CPT | Performed by: INTERNAL MEDICINE

## 2023-12-18 PROCEDURE — 25010000002 FLUOROURACIL PER 500 MG: Performed by: INTERNAL MEDICINE

## 2023-12-18 PROCEDURE — 25010000002 FOSAPREPITANT PER 1 MG: Performed by: INTERNAL MEDICINE

## 2023-12-18 PROCEDURE — 85025 COMPLETE CBC W/AUTO DIFF WBC: CPT | Performed by: INTERNAL MEDICINE

## 2023-12-18 PROCEDURE — 96375 TX/PRO/DX INJ NEW DRUG ADDON: CPT

## 2023-12-18 PROCEDURE — 96417 CHEMO IV INFUS EACH ADDL SEQ: CPT

## 2023-12-18 PROCEDURE — G0498 CHEMO EXTEND IV INFUS W/PUMP: HCPCS

## 2023-12-18 PROCEDURE — 25010000002 OXALIPLATIN PER 0.5 MG: Performed by: INTERNAL MEDICINE

## 2023-12-18 RX ORDER — FAMOTIDINE 10 MG/ML
20 INJECTION, SOLUTION INTRAVENOUS AS NEEDED
Status: CANCELLED | OUTPATIENT
Start: 2023-12-18

## 2023-12-18 RX ORDER — DIPHENHYDRAMINE HYDROCHLORIDE 50 MG/ML
50 INJECTION INTRAMUSCULAR; INTRAVENOUS AS NEEDED
Status: CANCELLED | OUTPATIENT
Start: 2023-12-18

## 2023-12-18 RX ORDER — PALONOSETRON 0.05 MG/ML
0.25 INJECTION, SOLUTION INTRAVENOUS ONCE
Status: COMPLETED | OUTPATIENT
Start: 2023-12-18 | End: 2023-12-18

## 2023-12-18 RX ORDER — SODIUM CHLORIDE 9 MG/ML
1000 INJECTION, SOLUTION INTRAVENOUS ONCE
Status: CANCELLED
Start: 2023-12-20 | End: 2023-12-18

## 2023-12-18 RX ORDER — DEXTROSE MONOHYDRATE 50 MG/ML
250 INJECTION, SOLUTION INTRAVENOUS ONCE
Status: COMPLETED | OUTPATIENT
Start: 2023-12-18 | End: 2023-12-18

## 2023-12-18 RX ADMIN — OXALIPLATIN 114 MG: 5 INJECTION, SOLUTION INTRAVENOUS at 11:20

## 2023-12-18 RX ADMIN — PALONOSETRON 0.25 MG: 0.25 INJECTION, SOLUTION INTRAVENOUS at 10:08

## 2023-12-18 RX ADMIN — LEUCOVORIN CALCIUM 700 MG: 350 INJECTION, POWDER, LYOPHILIZED, FOR SUSPENSION INTRAMUSCULAR; INTRAVENOUS at 11:20

## 2023-12-18 RX ADMIN — FLUOROURACIL 4200 MG: 50 INJECTION, SOLUTION INTRAVENOUS at 13:32

## 2023-12-18 RX ADMIN — DEXTROSE MONOHYDRATE 250 ML: 50 INJECTION, SOLUTION INTRAVENOUS at 09:34

## 2023-12-18 RX ADMIN — FOSAPREPITANT 100 ML: 150 INJECTION, POWDER, LYOPHILIZED, FOR SOLUTION INTRAVENOUS at 10:09

## 2023-12-18 RX ADMIN — SODIUM CHLORIDE 240 MG: 9 INJECTION, SOLUTION INTRAVENOUS at 09:34

## 2023-12-18 RX ADMIN — DEXAMETHASONE SODIUM PHOSPHATE 12 MG: 4 INJECTION, SOLUTION INTRA-ARTICULAR; INTRALESIONAL; INTRAMUSCULAR; INTRAVENOUS; SOFT TISSUE at 10:48

## 2023-12-18 NOTE — PHARMACY RECOMMENDATION
Doses will be recalculated based on the current weight, as per secured chat with Dr. Benito.  Thanks,  Dona Salazar PharmD, BSP, CSP

## 2023-12-20 ENCOUNTER — HOSPITAL ENCOUNTER (OUTPATIENT)
Dept: ONCOLOGY | Facility: HOSPITAL | Age: 73
Discharge: HOME | End: 2023-12-20
Admitting: INTERNAL MEDICINE
Payer: MEDICARE

## 2023-12-20 VITALS — SYSTOLIC BLOOD PRESSURE: 109 MMHG | HEART RATE: 91 BPM | DIASTOLIC BLOOD PRESSURE: 70 MMHG

## 2023-12-20 DIAGNOSIS — K22.89 ESOPHAGEAL MASS: Primary | ICD-10-CM

## 2023-12-20 PROCEDURE — 25810000003 SODIUM CHLORIDE 0.9 % SOLUTION: Performed by: INTERNAL MEDICINE

## 2023-12-20 PROCEDURE — 96360 HYDRATION IV INFUSION INIT: CPT

## 2023-12-20 PROCEDURE — 25010000002 HEPARIN LOCK FLUSH PER 10 UNITS: Performed by: INTERNAL MEDICINE

## 2023-12-20 RX ORDER — HEPARIN SODIUM (PORCINE) LOCK FLUSH IV SOLN 100 UNIT/ML 100 UNIT/ML
500 SOLUTION INTRAVENOUS AS NEEDED
OUTPATIENT
Start: 2023-12-20

## 2023-12-20 RX ORDER — SODIUM CHLORIDE 0.9 % (FLUSH) 0.9 %
20 SYRINGE (ML) INJECTION AS NEEDED
OUTPATIENT
Start: 2023-12-20

## 2023-12-20 RX ORDER — SODIUM CHLORIDE 9 MG/ML
1000 INJECTION, SOLUTION INTRAVENOUS ONCE
Status: COMPLETED | OUTPATIENT
Start: 2023-12-20 | End: 2023-12-20

## 2023-12-20 RX ORDER — HEPARIN SODIUM (PORCINE) LOCK FLUSH IV SOLN 100 UNIT/ML 100 UNIT/ML
500 SOLUTION INTRAVENOUS AS NEEDED
Status: DISCONTINUED | OUTPATIENT
Start: 2023-12-20 | End: 2023-12-21 | Stop reason: HOSPADM

## 2023-12-20 RX ORDER — SODIUM CHLORIDE 0.9 % (FLUSH) 0.9 %
20 SYRINGE (ML) INJECTION AS NEEDED
Status: DISCONTINUED | OUTPATIENT
Start: 2023-12-20 | End: 2023-12-21 | Stop reason: HOSPADM

## 2023-12-20 RX ADMIN — HEPARIN 500 UNITS: 100 SYRINGE at 15:13

## 2023-12-20 RX ADMIN — Medication 20 ML: at 15:13

## 2023-12-20 RX ADMIN — SODIUM CHLORIDE 1000 ML/HR: 9 INJECTION, SOLUTION INTRAVENOUS at 14:10

## 2023-12-20 NOTE — PROGRESS NOTES
Pt here for chemo pump unhook and IV fluids. 5FU pump is empty. Port aspirated and positive blood return noted. IV fluids given without difficulty. Port flushed with 10 ml NS and heparin 500 units, then de-accessed. Pt tolerated well.

## 2023-12-29 NOTE — PROGRESS NOTES
HEMATOLOGY ONCOLOGY OUTPATIENT FOLLOW UP       Patient name: Aravind Lim  : 1950  MRN: [id number]  Primary Care Physician: Abiodun Steele MD  Referring Physician: Abiodun Steele MD  Reason For Consult: Esophageal cancer      History of Present Illness:  Patient is a 73 y.o. male with recently diagnosed esophageal adenocarcinoma.    Patient has been having difficulty swallowing for the 5 months prior to presentation this is been progressing gradually.  Patient has been an active smoker with 50-pack-year smoking history.  He denies significant alcohol intake or GERD history.  His family history is positive for malignancy and 2 sisters and one nephew but he is unsure of the type of malignancy.  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  Enlarged mesenteric lymph node    2023 -PET/CT with large distal esophageal mass extending to the digastric cardia compatible with neoplasm.  Hypermetabolic node in the right thoracic inlet compatible with metastatic disease.  Bone lesion of L4 is most compatible with metastatic disease.  Largest lung nodule was not hypermetabolic likely benign process  CARIS NGS negative for HER 2, low TMB, FGFR2 amplified, PDL1 CPS 3      23 - CT guided biopsy of L4 - metastatic poorly differentiated carcinoma. Likely from esophageal primary.    2023 cycle 1 FOLFOX Opdivo  10/16/2023 - C 3 FOLFOX Opdivo.  10/30/2023 - C4 FOLFOX opdivo  23 - C5 FOLFOX opdivo decrease dose of oxaliplatin to 65 mg/m2  Continues to be on FOLFOX Opdivo  2023 -CT chest abdomen pelvis with decrease in size of the right supraclavicular  and right paratracheal lymphadenopathy.  Circumferential masslike thickening of the distal esophagus consistent with the known primary.  Stable central mesenteric lymphadenopathy.  Increased sclerosis of the L4 vertebral body lesion indicating posttreatment change.  Cholelithiasis.  Continue FOLFOX Opdivo      Subjective:  Dysphagia resolved, weight gain, no new symptoms.     Past Medical History:   Diagnosis Date    Anemia 08/04/2023    Anxiety 06/07/2022    Bilateral cataracts 06/23/2023    Colon polyp     Constipation 05/03/2022    BM daily w/ mineral oil.    COPD (chronic obstructive pulmonary disease)     COVID-19 08/16/2022    Dysphagia     Fatigue 05/03/2022    GERD (gastroesophageal reflux disease)     History of hiatal hernia     Impacted cerumen of right ear 05/03/2022    Prediabetes 05/10/2022       No past surgical history on file.      Current Outpatient Medications:     albuterol sulfate  (90 Base) MCG/ACT inhaler, Inhale 2 puffs Every 4 (Four) Hours As Needed., Disp: , Rfl:     budesonide-formoterol (SYMBICORT) 160-4.5 MCG/ACT inhaler, Inhale 2 puffs 2 (Two) Times a Day., Disp: , Rfl:     Cholecalciferol 50 MCG (2000 UT) tablet, Take 1 tablet by mouth Daily. (Patient not taking: Reported on 10/2/2023), Disp: , Rfl:     lidocaine-prilocaine (EMLA) 2.5-2.5 % cream, Apply 1 application  topically to the appropriate area as directed As Needed (apply 30-60 minutes prior to port access)., Disp: 30 g, Rfl: 2    omeprazole (priLOSEC) 40 MG capsule, Take 1 capsule by mouth Every Morning. (Patient not taking: Reported on 10/2/2023), Disp: , Rfl:     ondansetron (ZOFRAN) 8 MG tablet, Take 1 tablet by mouth 3 (Three) Times a Day As Needed for Nausea or Vomiting., Disp: 30 tablet, Rfl: 5    vitamin B-12 (CYANOCOBALAMIN) 1000 MCG tablet, Take 1 tablet by mouth Daily., Disp: , Rfl:     No Known Allergies    Family History   Problem Relation Age of Onset    Diabetes Mother     Diabetes Father     Cancer Sister          Unknown of type       Cancer-related family history includes Cancer in his sister.      Social History     Tobacco Use    Smoking status: Every Day     Packs/day: 1     Types: Cigarettes   Vaping Use    Vaping Use: Never used   Substance Use Topics    Alcohol use: Never    Drug use: Never     Social History     Social History Narrative    Not on file       Objective:    Vital Signs:  There were no vitals filed for this visit.      There is no height or weight on file to calculate BMI.    ECOG  (1) Restricted in physically strenuous activity, ambulatory and able to do work of light nature    Physical Exam:   Physical Exam  Constitutional:       Appearance: Normal appearance.   HENT:      Head: Normocephalic and atraumatic.   Eyes:      Pupils: Pupils are equal, round, and reactive to light.   Cardiovascular:      Rate and Rhythm: Normal rate and regular rhythm.      Pulses: Normal pulses.      Heart sounds: No murmur heard.  Pulmonary:      Effort: Pulmonary effort is normal.      Breath sounds: Normal breath sounds.   Abdominal:      General: There is no distension.      Palpations: Abdomen is soft. There is no mass.      Tenderness: There is no abdominal tenderness.   Musculoskeletal:         General: Normal range of motion.      Cervical back: Normal range of motion and neck supple.   Skin:     General: Skin is warm.   Neurological:      General: No focal deficit present.      Mental Status: He is alert and oriented to person, place, and time.   Psychiatric:         Mood and Affect: Mood normal.         Lab Results - Last 18 Months   Lab Units 12/18/23  0815 12/13/23  1456 11/27/23  0912   WBC 10*3/mm3 5.08 2.40* 3.19*   HEMOGLOBIN g/dL 12.3* 12.0* 11.6*   HEMATOCRIT % 38.0 37.0* 35.5*   PLATELETS 10*3/mm3 170 65* 81*   MCV fL 88.2 88.7 88.8     Lab Results - Last 18 Months   Lab Units 12/18/23  0815 12/13/23  1456 11/27/23  0912   SODIUM mmol/L 140 135* 132*   POTASSIUM mmol/L 3.5 3.9 4.2   CHLORIDE  "mmol/L 103 101 96*   CO2 mmol/L 25.0 24.0 26.0   BUN mg/dL 10 25* 14   CREATININE mg/dL 0.62* 0.70* 0.78   CALCIUM mg/dL 9.1 8.3* 9.4   BILIRUBIN mg/dL 0.5 0.4 0.3   ALK PHOS U/L 111 86 95   ALT (SGPT) U/L 29 26 40   AST (SGOT) U/L 53* 60* 48*   GLUCOSE mg/dL 88 98 150*       Lab Results   Component Value Date    GLUCOSE 88 12/18/2023    BUN 10 12/18/2023    CREATININE 0.62 (L) 12/18/2023    BCR 16.1 12/18/2023    K 3.5 12/18/2023    CO2 25.0 12/18/2023    CALCIUM 9.1 12/18/2023    ALBUMIN 3.4 (L) 12/18/2023    AST 53 (H) 12/18/2023    ALT 29 12/18/2023       Lab Results - Last 18 Months   Lab Units 08/29/23  1031   INR  0.94   APTT seconds 28.6       No results found for: \"IRON\", \"TIBC\", \"FERRITIN\"    No results found for: \"FOLATE\"    No results found for: \"OCCULTBLD\"    No results found for: \"RETICCTPCT\"  No results found for: \"PUXYBOBE53\"  No results found for: \"SPEP\", \"UPEP\"  No results found for: \"LDH\", \"URICACID\"  No results found for: \"LEO\", \"RF\", \"SEDRATE\"  No results found for: \"FIBRINOGEN\", \"HAPTOGLOBIN\"  Lab Results   Component Value Date    PTT 28.6 08/29/2023    INR 0.94 08/29/2023     No results found for: \"\"  No results found for: \"CEA\"  No components found for: \"CA-19-9\"  No results found for: \"PSA\"  No results found for: \"SEDRATE\"       Assessment & Plan     Patient is a 73-year-old male with esophageal adenocarcinoma of the distal esophagus.    Esophageal adenocarcinoma  Given CT imaging there is concern for metastatic lymph node in the mesentery  PET/CT with hypermetabolic lymph node, metastasis in the lumbar spine L4  Case discussed at our multidisciplinary tumor board conference    S/p biopsy of spinal lesion with metastatic disease.   Discussed starting treatment with FOLFOX opdivo given negative HER and NGS profile though PDL1 is low CPS 3  Started FOLFOX had significant chemo-induced nausea  Now improved added emend   Clinical improvement with swallowing  Increased fatigue, " myelosupression decrease oxaliplatin to 65 mg/m2  CT imaging with good response to treatment next in 2/2024  Now with continued myelosupression, decrease dose of oxaliplatin to 50 mg/m2 next week plan    Chemotherapy induced nausea vomiting  Patient is on emend continue the same    Poor appetite  Continues to have taste changes weight is stable    Myelosuppression  Discontinue 5-FU push  Decrease dose of oxaliplatin.    Hypothyroid  Subclinical at this point  monitor    Thank you very much for providing the opportunity to participate in this patient’s care. Please do not hesitate to call if there are any other questions.    F/u in 2 weeks    Time spent on encounter including record review, history taking, exam, discussion, counseling and documentation at: 40 minutes

## 2024-01-02 ENCOUNTER — HOSPITAL ENCOUNTER (OUTPATIENT)
Dept: ONCOLOGY | Facility: HOSPITAL | Age: 74
Discharge: HOME | End: 2024-01-02
Admitting: INTERNAL MEDICINE
Payer: MEDICARE

## 2024-01-02 ENCOUNTER — OFFICE VISIT (OUTPATIENT)
Dept: ONCOLOGY | Facility: CLINIC | Age: 74
End: 2024-01-02
Payer: MEDICARE

## 2024-01-02 VITALS
TEMPERATURE: 97.5 F | OXYGEN SATURATION: 96 % | RESPIRATION RATE: 16 BRPM | DIASTOLIC BLOOD PRESSURE: 63 MMHG | HEART RATE: 108 BPM | BODY MASS INDEX: 16.83 KG/M2 | WEIGHT: 127 LBS | SYSTOLIC BLOOD PRESSURE: 98 MMHG | HEIGHT: 73 IN

## 2024-01-02 VITALS
RESPIRATION RATE: 16 BRPM | WEIGHT: 127 LBS | HEART RATE: 108 BPM | OXYGEN SATURATION: 96 % | DIASTOLIC BLOOD PRESSURE: 63 MMHG | BODY MASS INDEX: 16.83 KG/M2 | TEMPERATURE: 97.5 F | HEIGHT: 73 IN | SYSTOLIC BLOOD PRESSURE: 98 MMHG

## 2024-01-02 DIAGNOSIS — K22.89 ESOPHAGEAL MASS: Primary | ICD-10-CM

## 2024-01-02 DIAGNOSIS — C15.9 ESOPHAGEAL ADENOCARCINOMA: Primary | ICD-10-CM

## 2024-01-02 LAB
ALBUMIN SERPL-MCNC: 3.7 G/DL (ref 3.5–5.2)
ALBUMIN/GLOB SERPL: 1.3 G/DL
ALP SERPL-CCNC: 100 U/L (ref 39–117)
ALT SERPL W P-5'-P-CCNC: 30 U/L (ref 1–41)
ANION GAP SERPL CALCULATED.3IONS-SCNC: 9 MMOL/L (ref 5–15)
AST SERPL-CCNC: 40 U/L (ref 1–40)
BASOPHILS # BLD AUTO: 0.01 10*3/MM3 (ref 0–0.2)
BASOPHILS NFR BLD AUTO: 0.4 % (ref 0–1.5)
BILIRUB SERPL-MCNC: 0.3 MG/DL (ref 0–1.2)
BUN SERPL-MCNC: 9 MG/DL (ref 8–23)
BUN/CREAT SERPL: 14.1 (ref 7–25)
CALCIUM SPEC-SCNC: 9.8 MG/DL (ref 8.6–10.5)
CHLORIDE SERPL-SCNC: 97 MMOL/L (ref 98–107)
CO2 SERPL-SCNC: 25 MMOL/L (ref 22–29)
CREAT SERPL-MCNC: 0.64 MG/DL (ref 0.76–1.27)
DEPRECATED RDW RBC AUTO: 65.6 FL (ref 37–54)
EGFRCR SERPLBLD CKD-EPI 2021: 100.6 ML/MIN/1.73
EOSINOPHIL # BLD AUTO: 0.05 10*3/MM3 (ref 0–0.4)
EOSINOPHIL NFR BLD AUTO: 2 % (ref 0.3–6.2)
ERYTHROCYTE [DISTWIDTH] IN BLOOD BY AUTOMATED COUNT: 20.7 % (ref 12.3–15.4)
GLOBULIN UR ELPH-MCNC: 2.8 GM/DL
GLUCOSE BLDC GLUCOMTR-MCNC: 160 MG/DL (ref 70–105)
GLUCOSE SERPL-MCNC: 164 MG/DL (ref 65–99)
HCT VFR BLD AUTO: 37.1 % (ref 37.5–51)
HGB BLD-MCNC: 11.8 G/DL (ref 13–17.7)
LYMPHOCYTES # BLD AUTO: 1.36 10*3/MM3 (ref 0.7–3.1)
LYMPHOCYTES NFR BLD AUTO: 55.1 % (ref 19.6–45.3)
MCH RBC QN AUTO: 29.1 PG (ref 26.6–33)
MCHC RBC AUTO-ENTMCNC: 31.8 G/DL (ref 31.5–35.7)
MCV RBC AUTO: 91.4 FL (ref 79–97)
MONOCYTES # BLD AUTO: 0.59 10*3/MM3 (ref 0.1–0.9)
MONOCYTES NFR BLD AUTO: 23.9 % (ref 5–12)
NEUTROPHILS NFR BLD AUTO: 0.46 10*3/MM3 (ref 1.7–7)
NEUTROPHILS NFR BLD AUTO: 18.6 % (ref 42.7–76)
PLATELET # BLD AUTO: 149 10*3/MM3 (ref 140–450)
PMV BLD AUTO: 11.8 FL (ref 6–12)
POTASSIUM SERPL-SCNC: 4.4 MMOL/L (ref 3.5–5.2)
PROT SERPL-MCNC: 6.5 G/DL (ref 6–8.5)
RBC # BLD AUTO: 4.06 10*6/MM3 (ref 4.14–5.8)
SODIUM SERPL-SCNC: 131 MMOL/L (ref 136–145)
WBC NRBC COR # BLD AUTO: 2.47 10*3/MM3 (ref 3.4–10.8)

## 2024-01-02 PROCEDURE — 80053 COMPREHEN METABOLIC PANEL: CPT | Performed by: INTERNAL MEDICINE

## 2024-01-02 PROCEDURE — 36591 DRAW BLOOD OFF VENOUS DEVICE: CPT

## 2024-01-02 PROCEDURE — 25010000002 HEPARIN LOCK FLUSH PER 10 UNITS: Performed by: INTERNAL MEDICINE

## 2024-01-02 PROCEDURE — 1160F RVW MEDS BY RX/DR IN RCRD: CPT | Performed by: INTERNAL MEDICINE

## 2024-01-02 PROCEDURE — 85025 COMPLETE CBC W/AUTO DIFF WBC: CPT | Performed by: INTERNAL MEDICINE

## 2024-01-02 PROCEDURE — 1126F AMNT PAIN NOTED NONE PRSNT: CPT | Performed by: INTERNAL MEDICINE

## 2024-01-02 PROCEDURE — 1159F MED LIST DOCD IN RCRD: CPT | Performed by: INTERNAL MEDICINE

## 2024-01-02 PROCEDURE — 82948 REAGENT STRIP/BLOOD GLUCOSE: CPT

## 2024-01-02 RX ORDER — HEPARIN SODIUM (PORCINE) LOCK FLUSH IV SOLN 100 UNIT/ML 100 UNIT/ML
500 SOLUTION INTRAVENOUS AS NEEDED
Status: DISCONTINUED | OUTPATIENT
Start: 2024-01-02 | End: 2024-01-03 | Stop reason: HOSPADM

## 2024-01-02 RX ORDER — SODIUM CHLORIDE 0.9 % (FLUSH) 0.9 %
20 SYRINGE (ML) INJECTION AS NEEDED
OUTPATIENT
Start: 2024-01-02

## 2024-01-02 RX ORDER — SODIUM CHLORIDE 0.9 % (FLUSH) 0.9 %
20 SYRINGE (ML) INJECTION AS NEEDED
Status: DISCONTINUED | OUTPATIENT
Start: 2024-01-02 | End: 2024-01-03 | Stop reason: HOSPADM

## 2024-01-02 RX ORDER — HEPARIN SODIUM (PORCINE) LOCK FLUSH IV SOLN 100 UNIT/ML 100 UNIT/ML
500 SOLUTION INTRAVENOUS AS NEEDED
OUTPATIENT
Start: 2024-01-02

## 2024-01-02 RX ADMIN — HEPARIN 500 UNITS: 100 SYRINGE at 10:21

## 2024-01-02 RX ADMIN — Medication 20 ML: at 10:21

## 2024-01-02 NOTE — PROGRESS NOTES
Pt to the clinic for Dr. Benito f/u and chemotherapy. Port accessed using sterile technique with positive blood return noted per KARLA Reed. 10cc of blood wasted prior to obtaining lab specimen per orders and flushed with 20cc normal saline. CBC resulted and ANC only 460.  Pt denies any s/s or infection or fevers.  Dr. Benito at chairside.  Hold chemo x1week.  Port flushed with 20cc normal saline and 500 units heparin then deaccessed. AVS given prior to discharge.

## 2024-01-02 NOTE — ADDENDUM NOTE
Encounter addended by: Myriam Sandoval RN on: 1/2/2024 11:40 AM   Actions taken: Charge Capture section accepted, Therapy plan modified

## 2024-01-08 ENCOUNTER — HOSPITAL ENCOUNTER (OUTPATIENT)
Dept: ONCOLOGY | Facility: HOSPITAL | Age: 74
Discharge: HOME | End: 2024-01-08
Admitting: INTERNAL MEDICINE
Payer: MEDICARE

## 2024-01-08 VITALS
DIASTOLIC BLOOD PRESSURE: 67 MMHG | BODY MASS INDEX: 17.36 KG/M2 | HEART RATE: 99 BPM | TEMPERATURE: 98.4 F | HEIGHT: 73 IN | WEIGHT: 131 LBS | SYSTOLIC BLOOD PRESSURE: 97 MMHG | OXYGEN SATURATION: 96 % | RESPIRATION RATE: 14 BRPM

## 2024-01-08 DIAGNOSIS — K22.89 ESOPHAGEAL MASS: ICD-10-CM

## 2024-01-08 DIAGNOSIS — E86.0 DEHYDRATION: Primary | ICD-10-CM

## 2024-01-08 LAB
ALBUMIN SERPL-MCNC: 3.6 G/DL (ref 3.5–5.2)
ALBUMIN/GLOB SERPL: 1.3 G/DL
ALP SERPL-CCNC: 100 U/L (ref 39–117)
ALT SERPL W P-5'-P-CCNC: 30 U/L (ref 1–41)
ANION GAP SERPL CALCULATED.3IONS-SCNC: 9 MMOL/L (ref 5–15)
AST SERPL-CCNC: 39 U/L (ref 1–40)
BASOPHILS # BLD AUTO: 0.01 10*3/MM3 (ref 0–0.2)
BASOPHILS NFR BLD AUTO: 0.3 % (ref 0–1.5)
BILIRUB SERPL-MCNC: 0.2 MG/DL (ref 0–1.2)
BUN SERPL-MCNC: 11 MG/DL (ref 8–23)
BUN/CREAT SERPL: 20 (ref 7–25)
CALCIUM SPEC-SCNC: 9.2 MG/DL (ref 8.6–10.5)
CHLORIDE SERPL-SCNC: 101 MMOL/L (ref 98–107)
CO2 SERPL-SCNC: 24 MMOL/L (ref 22–29)
CREAT SERPL-MCNC: 0.55 MG/DL (ref 0.76–1.27)
DEPRECATED RDW RBC AUTO: 69.2 FL (ref 37–54)
EGFRCR SERPLBLD CKD-EPI 2021: 105.3 ML/MIN/1.73
EOSINOPHIL # BLD AUTO: 0.06 10*3/MM3 (ref 0–0.4)
EOSINOPHIL NFR BLD AUTO: 1.6 % (ref 0.3–6.2)
ERYTHROCYTE [DISTWIDTH] IN BLOOD BY AUTOMATED COUNT: 21.1 % (ref 12.3–15.4)
GLOBULIN UR ELPH-MCNC: 2.8 GM/DL
GLUCOSE BLDC GLUCOMTR-MCNC: 136 MG/DL (ref 70–105)
GLUCOSE SERPL-MCNC: 126 MG/DL (ref 65–99)
HCT VFR BLD AUTO: 37.5 % (ref 37.5–51)
HGB BLD-MCNC: 11.9 G/DL (ref 13–17.7)
LYMPHOCYTES # BLD AUTO: 1.36 10*3/MM3 (ref 0.7–3.1)
LYMPHOCYTES NFR BLD AUTO: 36.2 % (ref 19.6–45.3)
MCH RBC QN AUTO: 29.2 PG (ref 26.6–33)
MCHC RBC AUTO-ENTMCNC: 31.7 G/DL (ref 31.5–35.7)
MCV RBC AUTO: 91.9 FL (ref 79–97)
MONOCYTES # BLD AUTO: 0.87 10*3/MM3 (ref 0.1–0.9)
MONOCYTES NFR BLD AUTO: 23.1 % (ref 5–12)
NEUTROPHILS NFR BLD AUTO: 1.46 10*3/MM3 (ref 1.7–7)
NEUTROPHILS NFR BLD AUTO: 38.8 % (ref 42.7–76)
PLATELET # BLD AUTO: 277 10*3/MM3 (ref 140–450)
PMV BLD AUTO: 10 FL (ref 6–12)
POTASSIUM SERPL-SCNC: 4.2 MMOL/L (ref 3.5–5.2)
PROT SERPL-MCNC: 6.4 G/DL (ref 6–8.5)
RBC # BLD AUTO: 4.08 10*6/MM3 (ref 4.14–5.8)
SODIUM SERPL-SCNC: 134 MMOL/L (ref 136–145)
WBC NRBC COR # BLD AUTO: 3.76 10*3/MM3 (ref 3.4–10.8)

## 2024-01-08 PROCEDURE — 96368 THER/DIAG CONCURRENT INF: CPT

## 2024-01-08 PROCEDURE — 25810000003 SODIUM CHLORIDE 0.9 % SOLUTION 250 ML FLEX CONT: Performed by: INTERNAL MEDICINE

## 2024-01-08 PROCEDURE — G0498 CHEMO EXTEND IV INFUS W/PUMP: HCPCS

## 2024-01-08 PROCEDURE — 96375 TX/PRO/DX INJ NEW DRUG ADDON: CPT

## 2024-01-08 PROCEDURE — 85025 COMPLETE CBC W/AUTO DIFF WBC: CPT | Performed by: INTERNAL MEDICINE

## 2024-01-08 PROCEDURE — 25010000002 OXALIPLATIN PER 0.5 MG: Performed by: INTERNAL MEDICINE

## 2024-01-08 PROCEDURE — 96417 CHEMO IV INFUS EACH ADDL SEQ: CPT

## 2024-01-08 PROCEDURE — 0 DEXTROSE 5 % SOLUTION 250 ML FLEX CONT: Performed by: INTERNAL MEDICINE

## 2024-01-08 PROCEDURE — 25010000002 DEXAMETHASONE SODIUM PHOSPHATE 120 MG/30ML SOLUTION: Performed by: INTERNAL MEDICINE

## 2024-01-08 PROCEDURE — 80053 COMPREHEN METABOLIC PANEL: CPT | Performed by: INTERNAL MEDICINE

## 2024-01-08 PROCEDURE — 96416 CHEMO PROLONG INFUSE W/PUMP: CPT

## 2024-01-08 PROCEDURE — 25010000002 NIVOLUMAB 240 MG/24ML SOLUTION 24 ML VIAL: Performed by: INTERNAL MEDICINE

## 2024-01-08 PROCEDURE — 0 DEXTROSE 5 % SOLUTION: Performed by: INTERNAL MEDICINE

## 2024-01-08 PROCEDURE — 25010000002 FLUOROURACIL PER 500 MG: Performed by: INTERNAL MEDICINE

## 2024-01-08 PROCEDURE — 25010000002 FOSAPREPITANT PER 1 MG: Performed by: INTERNAL MEDICINE

## 2024-01-08 PROCEDURE — 96415 CHEMO IV INFUSION ADDL HR: CPT

## 2024-01-08 PROCEDURE — 25010000002 PALONOSETRON 0.25 MG/5ML SOLUTION PREFILLED SYRINGE: Performed by: INTERNAL MEDICINE

## 2024-01-08 PROCEDURE — 96413 CHEMO IV INFUSION 1 HR: CPT

## 2024-01-08 PROCEDURE — 25010000002 LEUCOVORIN CALCIUM PER 50 MG: Performed by: INTERNAL MEDICINE

## 2024-01-08 PROCEDURE — 96367 TX/PROPH/DG ADDL SEQ IV INF: CPT

## 2024-01-08 PROCEDURE — 82948 REAGENT STRIP/BLOOD GLUCOSE: CPT

## 2024-01-08 RX ORDER — DIPHENHYDRAMINE HYDROCHLORIDE 50 MG/ML
50 INJECTION INTRAMUSCULAR; INTRAVENOUS AS NEEDED
Status: CANCELLED | OUTPATIENT
Start: 2024-01-08

## 2024-01-08 RX ORDER — DEXTROSE MONOHYDRATE 50 MG/ML
250 INJECTION, SOLUTION INTRAVENOUS ONCE
Status: COMPLETED | OUTPATIENT
Start: 2024-01-08 | End: 2024-01-08

## 2024-01-08 RX ORDER — PALONOSETRON 0.05 MG/ML
0.25 INJECTION, SOLUTION INTRAVENOUS ONCE
Status: COMPLETED | OUTPATIENT
Start: 2024-01-08 | End: 2024-01-08

## 2024-01-08 RX ORDER — FAMOTIDINE 10 MG/ML
20 INJECTION, SOLUTION INTRAVENOUS AS NEEDED
Status: CANCELLED | OUTPATIENT
Start: 2024-01-08

## 2024-01-08 RX ADMIN — SODIUM CHLORIDE 240 MG: 900 INJECTION, SOLUTION INTRAVENOUS at 09:58

## 2024-01-08 RX ADMIN — FLUOROURACIL 4220 MG: 50 INJECTION, SOLUTION INTRAVENOUS at 14:00

## 2024-01-08 RX ADMIN — PALONOSETRON 0.25 MG: 0.25 INJECTION, SOLUTION INTRAVENOUS at 11:11

## 2024-01-08 RX ADMIN — DEXAMETHASONE SODIUM PHOSPHATE 12 MG: 4 INJECTION, SOLUTION INTRA-ARTICULAR; INTRALESIONAL; INTRAMUSCULAR; INTRAVENOUS; SOFT TISSUE at 11:14

## 2024-01-08 RX ADMIN — OXALIPLATIN 90 MG: 5 INJECTION, SOLUTION INTRAVENOUS at 11:52

## 2024-01-08 RX ADMIN — DEXTROSE MONOHYDRATE 250 ML: 50 INJECTION, SOLUTION INTRAVENOUS at 09:57

## 2024-01-08 RX ADMIN — FOSAPREPITANT 100 ML: 150 INJECTION, POWDER, LYOPHILIZED, FOR SOLUTION INTRAVENOUS at 10:32

## 2024-01-08 RX ADMIN — LEUCOVORIN CALCIUM 700 MG: 350 INJECTION, POWDER, LYOPHILIZED, FOR SUSPENSION INTRAMUSCULAR; INTRAVENOUS at 11:49

## 2024-01-08 NOTE — PROGRESS NOTES
Patient is here for C8D1 opdivo, oxaliplatin, leucovorin and 5FU pump. Port accessed and flushed with good blood return noted. 10cc of blood wasted prior to specimen collection. Blood specimen obtained and sent to lab for processing per protocol. Dr. Quintana sent: Patient is a patient of Dr. Hernandez and is C8D1 oxaliplatin, leucovorin, opdivo and 5 FU pump only. Patient's only complaint today is he had some nausea from some tacos but took some zofran and it has helped some. Patient was held last week due to low ANC. today his ANC is still below parameters but up from last week. ANC 1.46, WBC 3.76, hgb 11.9, plts 277. is it ok to treat with his ANC 1.46 today? Dr. Quintana replied: please proceed. Pharmacy notified of patient taking zofran this morning at 0700 he is unsure of the dose but stated the one prescribed to him (on his MAR it stated 8mg tablets). [facility] pharmacist stated to tell him not to take anymore zofran for at least 40 hours due to patient receiving the aloxi today with treatment. Patient verbalized understanding. Patient tolerated treatment and was discharged with AVS.

## 2024-01-10 ENCOUNTER — HOSPITAL ENCOUNTER (OUTPATIENT)
Dept: ONCOLOGY | Facility: HOSPITAL | Age: 74
Discharge: HOME | End: 2024-01-10
Admitting: INTERNAL MEDICINE
Payer: MEDICARE

## 2024-01-10 VITALS
OXYGEN SATURATION: 94 % | HEART RATE: 93 BPM | DIASTOLIC BLOOD PRESSURE: 67 MMHG | RESPIRATION RATE: 16 BRPM | SYSTOLIC BLOOD PRESSURE: 109 MMHG

## 2024-01-10 DIAGNOSIS — K22.89 ESOPHAGEAL MASS: Primary | ICD-10-CM

## 2024-01-10 PROCEDURE — 25810000003 SODIUM CHLORIDE 0.9 % SOLUTION: Performed by: INTERNAL MEDICINE

## 2024-01-10 PROCEDURE — 25010000002 HEPARIN LOCK FLUSH PER 10 UNITS: Performed by: INTERNAL MEDICINE

## 2024-01-10 PROCEDURE — 96360 HYDRATION IV INFUSION INIT: CPT

## 2024-01-10 RX ORDER — SODIUM CHLORIDE 9 MG/ML
1000 INJECTION, SOLUTION INTRAVENOUS ONCE
Status: COMPLETED | OUTPATIENT
Start: 2024-01-10 | End: 2024-01-10

## 2024-01-10 RX ORDER — HEPARIN SODIUM (PORCINE) LOCK FLUSH IV SOLN 100 UNIT/ML 100 UNIT/ML
500 SOLUTION INTRAVENOUS AS NEEDED
OUTPATIENT
Start: 2024-01-10

## 2024-01-10 RX ORDER — SODIUM CHLORIDE 0.9 % (FLUSH) 0.9 %
20 SYRINGE (ML) INJECTION AS NEEDED
OUTPATIENT
Start: 2024-01-10

## 2024-01-10 RX ORDER — SODIUM CHLORIDE 0.9 % (FLUSH) 0.9 %
20 SYRINGE (ML) INJECTION AS NEEDED
Status: DISCONTINUED | OUTPATIENT
Start: 2024-01-10 | End: 2024-01-11 | Stop reason: HOSPADM

## 2024-01-10 RX ORDER — HEPARIN SODIUM (PORCINE) LOCK FLUSH IV SOLN 100 UNIT/ML 100 UNIT/ML
500 SOLUTION INTRAVENOUS AS NEEDED
Status: DISCONTINUED | OUTPATIENT
Start: 2024-01-10 | End: 2024-01-11 | Stop reason: HOSPADM

## 2024-01-10 RX ADMIN — Medication 20 ML: at 14:56

## 2024-01-10 RX ADMIN — HEPARIN 500 UNITS: 100 SYRINGE at 14:56

## 2024-01-10 RX ADMIN — SODIUM CHLORIDE 1000 ML/HR: 9 INJECTION, SOLUTION INTRAVENOUS at 13:44

## 2024-01-10 NOTE — PATIENT INSTRUCTIONS
Perform good skin care to avoid dry skin by using a moisturizer daily and it is OK to try benadryl PRN or somethin less sedating like a daily claritin or zyrtec, call if worsening to itching or develop rash

## 2024-01-10 NOTE — PROGRESS NOTES
Pt here for CIV DC/IVF, pump empty with blood return noted.. He receives Opdivo/Folfox and and reports that he has had itching with this treatment . He reports has not had this symptom before, he denies SOA , no rash noted, he denies any med changes or detergent changes, reviewed with MARY Guthrie NP and  instructed If it is just itching without any rash make sure he is doing good skin care to avoid dry skin by using a moisturizer daily and it is OK to try benadryl PRN or somethin less sedating like a daily claritin or zyrtec.  Reviewed with pt and spouse and v/u and agreement

## 2024-01-15 NOTE — PROGRESS NOTES
HEMATOLOGY ONCOLOGY OUTPATIENT FOLLOW UP       Patient name: Aravind Lim  : 1950  MRN: [id number]  Primary Care Physician: Abiodun Steele MD  Referring Physician: Abiodun Steele MD  Reason For Consult: Esophageal cancer      History of Present Illness:  Patient is a 73 y.o. male with recently diagnosed esophageal adenocarcinoma.    Patient has been having difficulty swallowing for the 5 months prior to presentation this is been progressing gradually.  Patient has been an active smoker with 50-pack-year smoking history.  He denies significant alcohol intake or GERD history.  His family history is positive for malignancy and 2 sisters and one nephew but he is unsure of the type of malignancy.  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  Enlarged mesenteric lymph node    2023 -PET/CT with large distal esophageal mass extending to the digastric cardia compatible with neoplasm.  Hypermetabolic node in the right thoracic inlet compatible with metastatic disease.  Bone lesion of L4 is most compatible with metastatic disease.  Largest lung nodule was not hypermetabolic likely benign process  CARIS NGS negative for HER 2, low TMB, FGFR2 amplified, PDL1 CPS 3      23 - CT guided biopsy of L4 - metastatic poorly differentiated carcinoma. Likely from esophageal primary.    2023 cycle 1 FOLFOX Opdivo  10/16/2023 - C 3 FOLFOX Opdivo.  10/30/2023 - C4 FOLFOX opdivo  23 - C5 FOLFOX opdivo decrease dose of oxaliplatin to 65 mg/m2  Continues to be on FOLFOX Opdivo  2023 -CT chest abdomen pelvis with decrease in size of the right supraclavicular  and right paratracheal lymphadenopathy.  Circumferential masslike thickening of the distal esophagus consistent with the known primary.  Stable central mesenteric lymphadenopathy.  Increased sclerosis of the L4 vertebral body lesion indicating posttreatment change.  Cholelithiasis.  Continue FOLFOX Opdivo        Subjective:  Dysphagia continues to improve, no diarrhea, no rash, mild neuropathy, eating drinking well.    Past Medical History:   Diagnosis Date    Anemia 08/04/2023    Anxiety 06/07/2022    Bilateral cataracts 06/23/2023    Colon polyp     Constipation 05/03/2022    BM daily w/ mineral oil.    COPD (chronic obstructive pulmonary disease)     COVID-19 08/16/2022    Dysphagia     Fatigue 05/03/2022    GERD (gastroesophageal reflux disease)     History of hiatal hernia     Impacted cerumen of right ear 05/03/2022    Prediabetes 05/10/2022       No past surgical history on file.      Current Outpatient Medications:     albuterol sulfate  (90 Base) MCG/ACT inhaler, Inhale 2 puffs Every 4 (Four) Hours As Needed., Disp: , Rfl:     budesonide-formoterol (SYMBICORT) 160-4.5 MCG/ACT inhaler, Inhale 2 puffs 2 (Two) Times a Day., Disp: , Rfl:     Cholecalciferol 50 MCG (2000 UT) tablet, Take 1 tablet by mouth Daily. (Patient not taking: Reported on 10/2/2023), Disp: , Rfl:     lidocaine-prilocaine (EMLA) 2.5-2.5 % cream, Apply 1 application  topically to the appropriate area as directed As Needed (apply 30-60 minutes prior to port access)., Disp: 30 g, Rfl: 2    omeprazole (priLOSEC) 40 MG capsule, Take 1 capsule by mouth Every Morning. (Patient not taking: Reported on 10/2/2023), Disp: , Rfl:     ondansetron (ZOFRAN) 8 MG tablet, Take 1 tablet by mouth 3 (Three) Times a Day As Needed for Nausea or Vomiting., Disp: 30 tablet, Rfl: 5    vitamin B-12 (CYANOCOBALAMIN) 1000 MCG tablet, Take 1 tablet by mouth Daily., Disp: , Rfl:     No Known Allergies    Family History   Problem Relation Age of Onset    Diabetes  Mother     Diabetes Father     Cancer Sister         Unknown of type       Cancer-related family history includes Cancer in his sister.      Social History     Tobacco Use    Smoking status: Every Day     Packs/day: 1     Types: Cigarettes   Vaping Use    Vaping Use: Never used   Substance Use Topics    Alcohol use: Never    Drug use: Never     Social History     Social History Narrative    Not on file       Objective:    Vital Signs:  There were no vitals filed for this visit.      There is no height or weight on file to calculate BMI.    ECOG  (1) Restricted in physically strenuous activity, ambulatory and able to do work of light nature    Physical Exam:   Physical Exam  Constitutional:       Appearance: Normal appearance.   HENT:      Head: Normocephalic and atraumatic.   Eyes:      Pupils: Pupils are equal, round, and reactive to light.   Cardiovascular:      Rate and Rhythm: Normal rate and regular rhythm.      Pulses: Normal pulses.      Heart sounds: No murmur heard.  Pulmonary:      Effort: Pulmonary effort is normal.      Breath sounds: Normal breath sounds.   Abdominal:      General: There is no distension.      Palpations: Abdomen is soft. There is no mass.      Tenderness: There is no abdominal tenderness.   Musculoskeletal:         General: Normal range of motion.      Cervical back: Normal range of motion and neck supple.   Skin:     General: Skin is warm.   Neurological:      General: No focal deficit present.      Mental Status: He is alert and oriented to person, place, and time.   Psychiatric:         Mood and Affect: Mood normal.         Lab Results - Last 18 Months   Lab Units 01/08/24  0902 01/02/24  0840 12/18/23  0815   WBC 10*3/mm3 3.76 2.47* 5.08   HEMOGLOBIN g/dL 11.9* 11.8* 12.3*   HEMATOCRIT % 37.5 37.1* 38.0   PLATELETS 10*3/mm3 277 149 170   MCV fL 91.9 91.4 88.2     Lab Results - Last 18 Months   Lab Units 01/08/24  0902 01/02/24  0840 12/18/23  0815   SODIUM mmol/L 134* 131* 140  "  POTASSIUM mmol/L 4.2 4.4 3.5   CHLORIDE mmol/L 101 97* 103   CO2 mmol/L 24.0 25.0 25.0   BUN mg/dL 11 9 10   CREATININE mg/dL 0.55* 0.64* 0.62*   CALCIUM mg/dL 9.2 9.8 9.1   BILIRUBIN mg/dL 0.2 0.3 0.5   ALK PHOS U/L 100 100 111   ALT (SGPT) U/L 30 30 29   AST (SGOT) U/L 39 40 53*   GLUCOSE mg/dL 126* 164* 88       Lab Results   Component Value Date    GLUCOSE 126 (H) 01/08/2024    BUN 11 01/08/2024    CREATININE 0.55 (L) 01/08/2024    BCR 20.0 01/08/2024    K 4.2 01/08/2024    CO2 24.0 01/08/2024    CALCIUM 9.2 01/08/2024    ALBUMIN 3.6 01/08/2024    AST 39 01/08/2024    ALT 30 01/08/2024       Lab Results - Last 18 Months   Lab Units 08/29/23  1031   INR  0.94   APTT seconds 28.6       No results found for: \"IRON\", \"TIBC\", \"FERRITIN\"    No results found for: \"FOLATE\"    No results found for: \"OCCULTBLD\"    No results found for: \"RETICCTPCT\"  No results found for: \"XONTXDME92\"  No results found for: \"SPEP\", \"UPEP\"  No results found for: \"LDH\", \"URICACID\"  No results found for: \"LEO\", \"RF\", \"SEDRATE\"  No results found for: \"FIBRINOGEN\", \"HAPTOGLOBIN\"  Lab Results   Component Value Date    PTT 28.6 08/29/2023    INR 0.94 08/29/2023     No results found for: \"\"  No results found for: \"CEA\"  No components found for: \"CA-19-9\"  No results found for: \"PSA\"  No results found for: \"SEDRATE\"       Assessment & Plan     Patient is a 73-year-old male with esophageal adenocarcinoma of the distal esophagus.    Esophageal adenocarcinoma  Given CT imaging there is concern for metastatic lymph node in the mesentery  PET/CT with hypermetabolic lymph node, metastasis in the lumbar spine L4  Case discussed at our multidisciplinary tumor board conference    S/p biopsy of spinal lesion with metastatic disease.   Discussed starting treatment with FOLFOX opdivo given negative HER and NGS profile though PDL1 is low CPS 3  Started FOLFOX had significant chemo-induced nausea  Now improved added emend   Clinical improvement with " swallowing  Increased fatigue, myelosupression decrease oxaliplatin to 65 mg/m2  CT imaging with good response to treatment next in 2/2024  Now with continued myelosupression, decrease dose of oxaliplatin to 50 mg/m2 next week plan  Repeat imaging in 4 weeks  2 week f/u with PA    Chemotherapy induced nausea vomiting  Patient is on emend continue the same    Poor appetite  Continues to have taste changes weight is stable    Myelosuppression  Discontinue 5-FU push  Decrease dose of oxaliplatin.    Hypothyroid  Subclinical at this point  monitor  Repeat today     Thank you very much for providing the opportunity to participate in this patient’s care. Please do not hesitate to call if there are any other questions.    F/u in 2 weeks with PA

## 2024-01-22 ENCOUNTER — OFFICE VISIT (OUTPATIENT)
Dept: ONCOLOGY | Facility: CLINIC | Age: 74
End: 2024-01-22
Payer: MEDICARE

## 2024-01-22 ENCOUNTER — HOSPITAL ENCOUNTER (OUTPATIENT)
Dept: ONCOLOGY | Facility: HOSPITAL | Age: 74
Discharge: HOME | End: 2024-01-22
Admitting: INTERNAL MEDICINE
Payer: MEDICARE

## 2024-01-22 VITALS
WEIGHT: 132 LBS | HEART RATE: 96 BPM | OXYGEN SATURATION: 96 % | RESPIRATION RATE: 16 BRPM | DIASTOLIC BLOOD PRESSURE: 61 MMHG | TEMPERATURE: 98.1 F | BODY MASS INDEX: 17.49 KG/M2 | SYSTOLIC BLOOD PRESSURE: 114 MMHG | HEIGHT: 73 IN

## 2024-01-22 VITALS
OXYGEN SATURATION: 96 % | TEMPERATURE: 98.1 F | SYSTOLIC BLOOD PRESSURE: 114 MMHG | HEART RATE: 96 BPM | DIASTOLIC BLOOD PRESSURE: 61 MMHG | RESPIRATION RATE: 16 BRPM | BODY MASS INDEX: 17.49 KG/M2 | HEIGHT: 73 IN | WEIGHT: 132 LBS

## 2024-01-22 DIAGNOSIS — D53.9 NUTRITIONAL ANEMIA, UNSPECIFIED: ICD-10-CM

## 2024-01-22 DIAGNOSIS — K22.89 ESOPHAGEAL MASS: ICD-10-CM

## 2024-01-22 DIAGNOSIS — C15.9 ESOPHAGEAL ADENOCARCINOMA: Primary | ICD-10-CM

## 2024-01-22 DIAGNOSIS — K22.89 ESOPHAGEAL MASS: Primary | ICD-10-CM

## 2024-01-22 DIAGNOSIS — C78.89 METASTATIC ADENOCARCINOMA TO ESOPHAGUS: ICD-10-CM

## 2024-01-22 LAB
ALBUMIN SERPL-MCNC: 3.8 G/DL (ref 3.5–5.2)
ALBUMIN/GLOB SERPL: 1.3 G/DL
ALP SERPL-CCNC: 107 U/L (ref 39–117)
ALT SERPL W P-5'-P-CCNC: 25 U/L (ref 1–41)
ANION GAP SERPL CALCULATED.3IONS-SCNC: 8 MMOL/L (ref 5–15)
AST SERPL-CCNC: 34 U/L (ref 1–40)
BASOPHILS # BLD AUTO: 0.02 10*3/MM3 (ref 0–0.2)
BASOPHILS NFR BLD AUTO: 0.5 % (ref 0–1.5)
BILIRUB SERPL-MCNC: 0.2 MG/DL (ref 0–1.2)
BUN SERPL-MCNC: 15 MG/DL (ref 8–23)
BUN/CREAT SERPL: 29.4 (ref 7–25)
CALCIUM SPEC-SCNC: 9.5 MG/DL (ref 8.6–10.5)
CHLORIDE SERPL-SCNC: 96 MMOL/L (ref 98–107)
CO2 SERPL-SCNC: 27 MMOL/L (ref 22–29)
CREAT SERPL-MCNC: 0.51 MG/DL (ref 0.76–1.27)
DEPRECATED RDW RBC AUTO: 69.1 FL (ref 37–54)
EGFRCR SERPLBLD CKD-EPI 2021: 107.7 ML/MIN/1.73
EOSINOPHIL # BLD AUTO: 0.31 10*3/MM3 (ref 0–0.4)
EOSINOPHIL NFR BLD AUTO: 7 % (ref 0.3–6.2)
ERYTHROCYTE [DISTWIDTH] IN BLOOD BY AUTOMATED COUNT: 21 % (ref 12.3–15.4)
GLOBULIN UR ELPH-MCNC: 2.9 GM/DL
GLUCOSE BLDC GLUCOMTR-MCNC: 87 MG/DL (ref 70–105)
GLUCOSE SERPL-MCNC: 85 MG/DL (ref 65–99)
HCT VFR BLD AUTO: 35.6 % (ref 37.5–51)
HGB BLD-MCNC: 11.3 G/DL (ref 13–17.7)
LYMPHOCYTES # BLD AUTO: 1.39 10*3/MM3 (ref 0.7–3.1)
LYMPHOCYTES NFR BLD AUTO: 31.4 % (ref 19.6–45.3)
MCH RBC QN AUTO: 29.4 PG (ref 26.6–33)
MCHC RBC AUTO-ENTMCNC: 31.7 G/DL (ref 31.5–35.7)
MCV RBC AUTO: 92.5 FL (ref 79–97)
MONOCYTES # BLD AUTO: 0.69 10*3/MM3 (ref 0.1–0.9)
MONOCYTES NFR BLD AUTO: 15.6 % (ref 5–12)
NEUTROPHILS NFR BLD AUTO: 2.02 10*3/MM3 (ref 1.7–7)
NEUTROPHILS NFR BLD AUTO: 45.5 % (ref 42.7–76)
PLATELET # BLD AUTO: 181 10*3/MM3 (ref 140–450)
PMV BLD AUTO: 10.8 FL (ref 6–12)
POTASSIUM SERPL-SCNC: 4.4 MMOL/L (ref 3.5–5.2)
PROT SERPL-MCNC: 6.7 G/DL (ref 6–8.5)
RBC # BLD AUTO: 3.85 10*6/MM3 (ref 4.14–5.8)
SODIUM SERPL-SCNC: 131 MMOL/L (ref 136–145)
T4 FREE SERPL-MCNC: 1.18 NG/DL (ref 0.93–1.7)
TSH SERPL DL<=0.05 MIU/L-ACNC: 4.85 UIU/ML (ref 0.27–4.2)
WBC NRBC COR # BLD AUTO: 4.43 10*3/MM3 (ref 3.4–10.8)

## 2024-01-22 PROCEDURE — 96375 TX/PRO/DX INJ NEW DRUG ADDON: CPT

## 2024-01-22 PROCEDURE — 96368 THER/DIAG CONCURRENT INF: CPT

## 2024-01-22 PROCEDURE — 84443 ASSAY THYROID STIM HORMONE: CPT | Performed by: INTERNAL MEDICINE

## 2024-01-22 PROCEDURE — 82948 REAGENT STRIP/BLOOD GLUCOSE: CPT | Performed by: INTERNAL MEDICINE

## 2024-01-22 PROCEDURE — 0 DEXTROSE 5 % SOLUTION 250 ML FLEX CONT: Performed by: INTERNAL MEDICINE

## 2024-01-22 PROCEDURE — 25010000002 NIVOLUMAB 240 MG/24ML SOLUTION 24 ML VIAL: Performed by: INTERNAL MEDICINE

## 2024-01-22 PROCEDURE — 96416 CHEMO PROLONG INFUSE W/PUMP: CPT

## 2024-01-22 PROCEDURE — 84439 ASSAY OF FREE THYROXINE: CPT | Performed by: INTERNAL MEDICINE

## 2024-01-22 PROCEDURE — 25010000002 PALONOSETRON 0.25 MG/5ML SOLUTION PREFILLED SYRINGE: Performed by: INTERNAL MEDICINE

## 2024-01-22 PROCEDURE — G0498 CHEMO EXTEND IV INFUS W/PUMP: HCPCS

## 2024-01-22 PROCEDURE — 99214 OFFICE O/P EST MOD 30 MIN: CPT | Performed by: INTERNAL MEDICINE

## 2024-01-22 PROCEDURE — 96417 CHEMO IV INFUS EACH ADDL SEQ: CPT

## 2024-01-22 PROCEDURE — 96367 TX/PROPH/DG ADDL SEQ IV INF: CPT

## 2024-01-22 PROCEDURE — 25810000003 SODIUM CHLORIDE 0.9 % SOLUTION 250 ML FLEX CONT: Performed by: INTERNAL MEDICINE

## 2024-01-22 PROCEDURE — 96415 CHEMO IV INFUSION ADDL HR: CPT

## 2024-01-22 PROCEDURE — 25010000002 FOSAPREPITANT PER 1 MG: Performed by: INTERNAL MEDICINE

## 2024-01-22 PROCEDURE — 80053 COMPREHEN METABOLIC PANEL: CPT | Performed by: INTERNAL MEDICINE

## 2024-01-22 PROCEDURE — 85025 COMPLETE CBC W/AUTO DIFF WBC: CPT | Performed by: INTERNAL MEDICINE

## 2024-01-22 PROCEDURE — 25010000002 DEXAMETHASONE SODIUM PHOSPHATE 120 MG/30ML SOLUTION: Performed by: INTERNAL MEDICINE

## 2024-01-22 PROCEDURE — 25010000002 LEUCOVORIN CALCIUM PER 50 MG: Performed by: INTERNAL MEDICINE

## 2024-01-22 PROCEDURE — 25010000002 OXALIPLATIN PER 0.5 MG: Performed by: INTERNAL MEDICINE

## 2024-01-22 PROCEDURE — 96413 CHEMO IV INFUSION 1 HR: CPT

## 2024-01-22 PROCEDURE — 25010000002 FLUOROURACIL PER 500 MG: Performed by: INTERNAL MEDICINE

## 2024-01-22 PROCEDURE — 0 DEXTROSE 5 % SOLUTION: Performed by: INTERNAL MEDICINE

## 2024-01-22 RX ORDER — PALONOSETRON 0.05 MG/ML
0.25 INJECTION, SOLUTION INTRAVENOUS ONCE
Status: COMPLETED | OUTPATIENT
Start: 2024-01-22 | End: 2024-01-22

## 2024-01-22 RX ORDER — FAMOTIDINE 10 MG/ML
20 INJECTION, SOLUTION INTRAVENOUS AS NEEDED
Status: CANCELLED | OUTPATIENT
Start: 2024-01-22

## 2024-01-22 RX ORDER — SODIUM CHLORIDE 9 MG/ML
1000 INJECTION, SOLUTION INTRAVENOUS ONCE
Status: CANCELLED
Start: 2024-01-24 | End: 2024-01-24

## 2024-01-22 RX ORDER — DEXTROSE MONOHYDRATE 50 MG/ML
250 INJECTION, SOLUTION INTRAVENOUS ONCE
Status: COMPLETED | OUTPATIENT
Start: 2024-01-22 | End: 2024-01-22

## 2024-01-22 RX ORDER — DIPHENHYDRAMINE HYDROCHLORIDE 50 MG/ML
50 INJECTION INTRAMUSCULAR; INTRAVENOUS AS NEEDED
Status: CANCELLED | OUTPATIENT
Start: 2024-01-22

## 2024-01-22 RX ADMIN — LEUCOVORIN CALCIUM 700 MG: 350 INJECTION, POWDER, LYOPHILIZED, FOR SUSPENSION INTRAMUSCULAR; INTRAVENOUS at 11:13

## 2024-01-22 RX ADMIN — DEXAMETHASONE SODIUM PHOSPHATE 12 MG: 4 INJECTION, SOLUTION INTRA-ARTICULAR; INTRALESIONAL; INTRAMUSCULAR; INTRAVENOUS; SOFT TISSUE at 10:46

## 2024-01-22 RX ADMIN — FOSAPREPITANT 100 ML: 150 INJECTION, POWDER, LYOPHILIZED, FOR SOLUTION INTRAVENOUS at 10:08

## 2024-01-22 RX ADMIN — FLUOROURACIL 4220 MG: 50 INJECTION, SOLUTION INTRAVENOUS at 13:20

## 2024-01-22 RX ADMIN — DEXTROSE MONOHYDRATE 250 ML: 50 INJECTION, SOLUTION INTRAVENOUS at 09:32

## 2024-01-22 RX ADMIN — PALONOSETRON 0.25 MG: 0.25 INJECTION, SOLUTION INTRAVENOUS at 10:06

## 2024-01-22 RX ADMIN — SODIUM CHLORIDE 240 MG: 900 INJECTION, SOLUTION INTRAVENOUS at 09:34

## 2024-01-22 RX ADMIN — OXALIPLATIN 90 MG: 5 INJECTION, SOLUTION INTRAVENOUS at 11:14

## 2024-01-22 NOTE — PROGRESS NOTES
Message below sent to MD Dr. Benito, pt. is here for C9D1 Folfox Opdivo, pt. seems to be doing well today. He states he did have a cold last week and he still has a lingering intermittent cough which has improved, no fevers. He states his neuropathy is mild numbness/tingling in his fingers and toes which has not changed. CBC results within parameters. DO you want to see him before treatment? 01/22/24 08:10   OK to treat per Dr. Benito  Pt. Was seen by Dr. Benito for follow up appointment today.   Treatment given as ordered and pt. Tolerated well.   Pt. Discharged from clinic with no complaints and AVS was given.

## 2024-01-24 ENCOUNTER — OFFICE VISIT (OUTPATIENT)
Dept: ONCOLOGY | Facility: CLINIC | Age: 74
End: 2024-01-24
Payer: MEDICARE

## 2024-01-24 ENCOUNTER — HOSPITAL ENCOUNTER (OUTPATIENT)
Dept: ONCOLOGY | Facility: HOSPITAL | Age: 74
Discharge: HOME | End: 2024-01-24
Admitting: INTERNAL MEDICINE
Payer: MEDICARE

## 2024-01-24 VITALS — HEART RATE: 98 BPM | SYSTOLIC BLOOD PRESSURE: 100 MMHG | DIASTOLIC BLOOD PRESSURE: 65 MMHG

## 2024-01-24 DIAGNOSIS — K22.89 ESOPHAGEAL MASS: Primary | ICD-10-CM

## 2024-01-24 DIAGNOSIS — C15.9 ESOPHAGEAL ADENOCARCINOMA: ICD-10-CM

## 2024-01-24 DIAGNOSIS — R21 RASH: Primary | ICD-10-CM

## 2024-01-24 DIAGNOSIS — K59.00 CONSTIPATION, UNSPECIFIED CONSTIPATION TYPE: ICD-10-CM

## 2024-01-24 PROCEDURE — 96360 HYDRATION IV INFUSION INIT: CPT

## 2024-01-24 PROCEDURE — 25810000003 SODIUM CHLORIDE 0.9 % SOLUTION: Performed by: INTERNAL MEDICINE

## 2024-01-24 PROCEDURE — 25010000002 HEPARIN LOCK FLUSH PER 10 UNITS: Performed by: INTERNAL MEDICINE

## 2024-01-24 RX ORDER — SODIUM CHLORIDE 0.9 % (FLUSH) 0.9 %
20 SYRINGE (ML) INJECTION AS NEEDED
Status: DISCONTINUED | OUTPATIENT
Start: 2024-01-24 | End: 2024-01-25 | Stop reason: HOSPADM

## 2024-01-24 RX ORDER — SODIUM CHLORIDE 0.9 % (FLUSH) 0.9 %
20 SYRINGE (ML) INJECTION AS NEEDED
OUTPATIENT
Start: 2024-01-24

## 2024-01-24 RX ORDER — DOCUSATE SODIUM 100 MG/1
100 CAPSULE, LIQUID FILLED ORAL
Qty: 30 CAPSULE | Refills: 2 | Status: SHIPPED | OUTPATIENT
Start: 2024-01-24

## 2024-01-24 RX ORDER — TRIAMCINOLONE ACETONIDE 1 MG/G
1 OINTMENT TOPICAL 2 TIMES DAILY
Qty: 30 G | Refills: 0 | Status: SHIPPED | OUTPATIENT
Start: 2024-01-24

## 2024-01-24 RX ORDER — SODIUM CHLORIDE 9 MG/ML
1000 INJECTION, SOLUTION INTRAVENOUS ONCE
Status: COMPLETED | OUTPATIENT
Start: 2024-01-24 | End: 2024-01-24

## 2024-01-24 RX ORDER — HEPARIN SODIUM (PORCINE) LOCK FLUSH IV SOLN 100 UNIT/ML 100 UNIT/ML
500 SOLUTION INTRAVENOUS AS NEEDED
Status: DISCONTINUED | OUTPATIENT
Start: 2024-01-24 | End: 2024-01-25 | Stop reason: HOSPADM

## 2024-01-24 RX ORDER — HEPARIN SODIUM (PORCINE) LOCK FLUSH IV SOLN 100 UNIT/ML 100 UNIT/ML
500 SOLUTION INTRAVENOUS AS NEEDED
OUTPATIENT
Start: 2024-01-24

## 2024-01-24 RX ADMIN — SODIUM CHLORIDE 1000 ML/HR: 9 INJECTION, SOLUTION INTRAVENOUS at 13:28

## 2024-01-24 RX ADMIN — Medication 20 ML: at 14:30

## 2024-01-24 RX ADMIN — HEPARIN 500 UNITS: 100 SYRINGE at 14:30

## 2024-01-24 NOTE — PROGRESS NOTES
Patient is here for 5FU pump discontine. Pump empty and positive blood return noted. Flushed with NS and IVF's started. Karen BRAGA NP sent: patient is here for pump d/c and ivf's. he stated 3 weeks ago after treatment he started to get blisters that were itching. he stated today after this last treatment they are worse and everywhere and was wondering if there was something he could put on them? he is also constipated and the miralax he takes does not work the best-is there anything else you would recommend? they look like big welts and he has a lot on both hips that I saw the right hip. he said they are all down his legs, up his back and I saw some on his arms. Karen BRAGA NP came to see patient and afterwards said she was sending him in a steroid cream and stool softners and we are holding the opdivo until his rash is 10% or less of his body. Patient verbalized understanding. Patient tolerated treatment and was discharged with AVS.

## 2024-01-24 NOTE — PROGRESS NOTES
HEMATOLOGY ONCOLOGY OUTPATIENT FOLLOW UP       Patient name: Aravind Lim  : 1950  MRN: [id number]  Primary Care Physician: Abiodun Steele MD  Referring Physician: No ref. provider found  Reason For Consult: Esophageal cancer      History of Present Illness:  Patient is a 73 y.o. male with recently diagnosed esophageal adenocarcinoma.    Patient has been having difficulty swallowing for the 5 months prior to presentation this is been progressing gradually.  Patient has been an active smoker with 50-pack-year smoking history.  He denies significant alcohol intake or GERD history.  His family history is positive for malignancy and 2 sisters and one nephew but he is unsure of the type of malignancy.  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  Enlarged mesenteric lymph node    2023 -PET/CT with large distal esophageal mass extending to the digastric cardia compatible with neoplasm.  Hypermetabolic node in the right thoracic inlet compatible with metastatic disease.  Bone lesion of L4 is most compatible with metastatic disease.  Largest lung nodule was not hypermetabolic likely benign process  CARIS NGS negative for HER 2, low TMB, FGFR2 amplified, PDL1 CPS 3      23 - CT guided biopsy of L4 - metastatic poorly differentiated carcinoma. Likely from esophageal primary.    2023 cycle 1 FOLFOX Opdivo  10/16/2023 - C 3 FOLFOX Opdivo.  10/30/2023 - C4 FOLFOX opdivo  23 - C5 FOLFOX opdivo decrease dose of oxaliplatin to 65 mg/m2  Continues to be on FOLFOX Opdivo  2023 -CT chest abdomen pelvis with decrease in size of the right  supraclavicular and right paratracheal lymphadenopathy.  Circumferential masslike thickening of the distal esophagus consistent with the known primary.  Stable central mesenteric lymphadenopathy.  Increased sclerosis of the L4 vertebral body lesion indicating posttreatment change.  Cholelithiasis.  Continue FOLFOX Opdivo        Subjective:  Aravind is here today for his CIV DC.  He reported to his nurse that he has a rash.  Rash has been present for approximately 2 weeks and started on his buttocks alone but he notes that it has now progressed to his lower back, bilateral lower extremities and also is now appearing on the bilateral forearms.  He reports that the rash is very itchy.  It is not painful but there are areas that are somewhat sore now from itching.  In addition to this he notes that he has been constipated.  He uses MiraLAX without relief.  He does note that he normally will have a bowel movement every 3 days but his stools are hard.  He feels like he is drinking well and states he drinks about a half a gallon of fluid a day.  He has not had any new medications.  He has not had any fevers.    Past Medical History:   Diagnosis Date    Anemia 08/04/2023    Anxiety 06/07/2022    Bilateral cataracts 06/23/2023    Colon polyp     Constipation 05/03/2022    BM daily w/ mineral oil.    COPD (chronic obstructive pulmonary disease)     COVID-19 08/16/2022    Dysphagia     Fatigue 05/03/2022    GERD (gastroesophageal reflux disease)     History of hiatal hernia     Impacted cerumen of right ear 05/03/2022    Prediabetes 05/10/2022       No past surgical history on file.      Current Outpatient Medications:     albuterol sulfate  (90 Base) MCG/ACT inhaler, Inhale 2 puffs Every 4 (Four) Hours As Needed., Disp: , Rfl:     budesonide-formoterol (SYMBICORT) 160-4.5 MCG/ACT inhaler, Inhale 2 puffs 2 (Two) Times a Day., Disp: , Rfl:     Cholecalciferol 50 MCG (2000 UT) tablet, Take 1 tablet by mouth Daily. (Patient  not taking: Reported on 10/2/2023), Disp: , Rfl:     docusate sodium (Colace) 100 MG capsule, Take 1 capsule by mouth every night at bedtime., Disp: 30 capsule, Rfl: 2    lidocaine-prilocaine (EMLA) 2.5-2.5 % cream, Apply 1 application  topically to the appropriate area as directed As Needed (apply 30-60 minutes prior to port access)., Disp: 30 g, Rfl: 2    omeprazole (priLOSEC) 40 MG capsule, Take 1 capsule by mouth Every Morning. (Patient not taking: Reported on 10/2/2023), Disp: , Rfl:     ondansetron (ZOFRAN) 8 MG tablet, Take 1 tablet by mouth 3 (Three) Times a Day As Needed for Nausea or Vomiting., Disp: 30 tablet, Rfl: 5    triamcinolone (KENALOG) 0.1 % ointment, Apply 1 application  topically to the appropriate area as directed 2 (Two) Times a Day., Disp: 30 g, Rfl: 0    vitamin B-12 (CYANOCOBALAMIN) 1000 MCG tablet, Take 1 tablet by mouth Daily., Disp: , Rfl:   No current facility-administered medications for this visit.    Facility-Administered Medications Ordered in Other Visits:     heparin injection 500 Units, 500 Units, Intravenous, PRN, Sophie Benito MD    sodium chloride 0.9 % flush 20 mL, 20 mL, Intravenous, PRN, Sophie Benito MD    No Known Allergies    Family History   Problem Relation Age of Onset    Diabetes Mother     Diabetes Father     Cancer Sister         Unknown of type       Cancer-related family history includes Cancer in his sister.      Social History     Tobacco Use    Smoking status: Every Day     Packs/day: 1     Types: Cigarettes   Vaping Use    Vaping Use: Never used   Substance Use Topics    Alcohol use: Never    Drug use: Never     Social History     Social History Narrative    Not on file       Objective:    Vital Signs:  There were no vitals filed for this visit.      There is no height or weight on file to calculate BMI.    ECOG  (1) Restricted in physically strenuous activity, ambulatory and able to do work of light nature    Physical Exam:   Physical Exam  Constitutional:        Appearance: Normal appearance.   HENT:      Head: Normocephalic and atraumatic.   Eyes:      Pupils: Pupils are equal, round, and reactive to light.   Cardiovascular:      Rate and Rhythm: Normal rate and regular rhythm.      Pulses: Normal pulses.      Heart sounds: No murmur heard.  Pulmonary:      Effort: Pulmonary effort is normal.      Breath sounds: Normal breath sounds.   Abdominal:      General: There is no distension.      Palpations: Abdomen is soft. There is no mass.      Tenderness: There is no abdominal tenderness.   Musculoskeletal:         General: Normal range of motion.      Cervical back: Normal range of motion and neck supple.   Skin:     General: Skin is warm.      Comments: Patient with erythematous plaques to the bilateral buttocks, bilateral lower extremities, lower back, diffusely to the bilateral forearms.  Evidence of excoriation from scratching.  Few lesions with a vesicular component.  Somewhat eczematous in appearance.   Neurological:      General: No focal deficit present.      Mental Status: He is alert and oriented to person, place, and time.   Psychiatric:         Mood and Affect: Mood normal.         Lab Results - Last 18 Months   Lab Units 01/22/24  0810 01/08/24  0902 01/02/24  0840   WBC 10*3/mm3 4.43 3.76 2.47*   HEMOGLOBIN g/dL 11.3* 11.9* 11.8*   HEMATOCRIT % 35.6* 37.5 37.1*   PLATELETS 10*3/mm3 181 277 149   MCV fL 92.5 91.9 91.4     Lab Results - Last 18 Months   Lab Units 01/22/24  0810 01/08/24  0902 01/02/24  0840   SODIUM mmol/L 131* 134* 131*   POTASSIUM mmol/L 4.4 4.2 4.4   CHLORIDE mmol/L 96* 101 97*   CO2 mmol/L 27.0 24.0 25.0   BUN mg/dL 15 11 9   CREATININE mg/dL 0.51* 0.55* 0.64*   CALCIUM mg/dL 9.5 9.2 9.8   BILIRUBIN mg/dL 0.2 0.2 0.3   ALK PHOS U/L 107 100 100   ALT (SGPT) U/L 25 30 30   AST (SGOT) U/L 34 39 40   GLUCOSE mg/dL 85 126* 164*       Lab Results   Component Value Date    GLUCOSE 85 01/22/2024    BUN 15 01/22/2024    CREATININE 0.51 (L)  "01/22/2024    BCR 29.4 (H) 01/22/2024    K 4.4 01/22/2024    CO2 27.0 01/22/2024    CALCIUM 9.5 01/22/2024    ALBUMIN 3.8 01/22/2024    AST 34 01/22/2024    ALT 25 01/22/2024       Lab Results - Last 18 Months   Lab Units 08/29/23  1031   INR  0.94   APTT seconds 28.6       No results found for: \"IRON\", \"TIBC\", \"FERRITIN\"    No results found for: \"FOLATE\"    No results found for: \"OCCULTBLD\"    No results found for: \"RETICCTPCT\"  No results found for: \"OSJOFXTM06\"  No results found for: \"SPEP\", \"UPEP\"  No results found for: \"LDH\", \"URICACID\"  No results found for: \"LEO\", \"RF\", \"SEDRATE\"  No results found for: \"FIBRINOGEN\", \"HAPTOGLOBIN\"  Lab Results   Component Value Date    PTT 28.6 08/29/2023    INR 0.94 08/29/2023     No results found for: \"\"  No results found for: \"CEA\"  No components found for: \"CA-19-9\"  No results found for: \"PSA\"  No results found for: \"SEDRATE\"       Assessment & Plan     Patient is a 73-year-old male with esophageal adenocarcinoma of the distal esophagus.    Esophageal adenocarcinoma  Given CT imaging there is concern for metastatic lymph node in the mesentery  PET/CT with hypermetabolic lymph node, metastasis in the lumbar spine L4  Case discussed at our multidisciplinary tumor board conference    S/p biopsy of spinal lesion with metastatic disease.   Discussed starting treatment with FOLFOX opdivo given negative HER and NGS profile though PDL1 is low CPS 3  Started FOLFOX had significant chemo-induced nausea  Now improved added emend   Clinical improvement with swallowing  Increased fatigue, myelosupression decrease oxaliplatin to 65 mg/m2  CT imaging with good response to treatment next in 2/2024  Now with continued myelosupression, decrease dose of oxaliplatin to 50 mg/m2 next week plan  Repeat imaging in 4 weeks  2 week f/u with PA    Chemotherapy induced nausea vomiting  Patient is on emend continue the same    Poor appetite  Continues to have taste changes weight is " stable    Myelosuppression  Discontinue 5-FU push  Decrease dose of oxaliplatin.    Hypothyroid  Subclinical at this point  monitor    Rash  Newly developed over the past 2 weeks progressive rash covering over 50% of the body  Pruritic  No new medications  Considered immune mediated.  Discussed with Dr. Benito and will hold Opdivo until rash is less than 10% of body surface area  Topical steroids, emollients, OTC antihistamine for itching as needed  And call for worsening or nonresolution    Constipation  Having hard stools approximately every 3 days  Advised to increase fluid intake  Continue MiraLAX every morning and add Colace nightly daily    Thank you very much for providing the opportunity to participate in this patient’s care. Please do not hesitate to call if there are any other questions.

## 2024-02-02 DIAGNOSIS — K22.89 ESOPHAGEAL MASS: Primary | ICD-10-CM

## 2024-02-02 NOTE — PROGRESS NOTES
HEMATOLOGY ONCOLOGY OUTPATIENT FOLLOW UP       Patient name: Aravind Lim  : 1950  MRN: [id number]  Primary Care Physician: Abiodun Steele MD  Referring Physician: Abiodun Steele MD  Reason For Consult: Esophageal cancer      History of Present Illness:  Patient is a 73 y.o. male with recently diagnosed esophageal adenocarcinoma.    Patient has been having difficulty swallowing for the 5 months prior to presentation this is been progressing gradually.  Patient has been an active smoker with 50-pack-year smoking history.  He denies significant alcohol intake or GERD history.  His family history is positive for malignancy and 2 sisters and one nephew but he is unsure of the type of malignancy.  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  Enlarged mesenteric lymph node    2023 -PET/CT with large distal esophageal mass extending to the digastric cardia compatible with neoplasm.  Hypermetabolic node in the right thoracic inlet compatible with metastatic disease.  Bone lesion of L4 is most compatible with metastatic disease.  Largest lung nodule was not hypermetabolic likely benign process  CARIS NGS negative for HER 2, low TMB, FGFR2 amplified, PDL1 CPS 3      23 - CT guided biopsy of L4 - metastatic poorly differentiated carcinoma. Likely from esophageal primary.    2023 cycle 1 FOLFOX Opdivo  10/16/2023 - C 3 FOLFOX Opdivo.  10/30/2023 - C4 FOLFOX opdivo  23 - C5 FOLFOX opdivo decrease dose of oxaliplatin to 65 mg/m2  Continues to be on FOLFOX Opdivo  2023 -CT chest abdomen pelvis with decrease in size of the right supraclavicular  and right paratracheal lymphadenopathy.  Circumferential masslike thickening of the distal esophagus consistent with the known primary.  Stable central mesenteric lymphadenopathy.  Increased sclerosis of the L4 vertebral body lesion indicating posttreatment change.  Cholelithiasis.  Continue FOLFOX Opdivo  2/5/2024-cycle 10 FOLFOX Opdivo held due to ANC of 880        Subjective:  Aravind is here for routine follow-up and cycle 10 of FOLFOX and Opdivo.  Opdivo on hold due to immune mediated rash.  He reports that the rash has almost resolved and is only present and a small amount on the lower back and bilateral forearms but that the itching is resolved.  He reports that he did have some diarrhea while utilizing the medications to correct constipation but that has now resolved and he is having a bowel movement daily.  He states that he does drink but probably does not always drink as much as he should and has had some dizziness upon standing and also some fatigue.    Past Medical History:   Diagnosis Date    Anemia 08/04/2023    Anxiety 06/07/2022    Bilateral cataracts 06/23/2023    Colon polyp     Constipation 05/03/2022    BM daily w/ mineral oil.    COPD (chronic obstructive pulmonary disease)     COVID-19 08/16/2022    Dysphagia     Fatigue 05/03/2022    GERD (gastroesophageal reflux disease)     History of hiatal hernia     Impacted cerumen of right ear 05/03/2022    Prediabetes 05/10/2022       No past surgical history on file.      Current Outpatient Medications:     albuterol sulfate  (90 Base) MCG/ACT inhaler, Inhale 2 puffs Every 4 (Four) Hours As Needed., Disp: , Rfl:     budesonide-formoterol (SYMBICORT) 160-4.5 MCG/ACT inhaler, Inhale 2 puffs 2 (Two) Times a Day., Disp: , Rfl:     Cholecalciferol 50 MCG (2000 UT) tablet, Take 1 tablet by mouth Daily. (Patient not taking: Reported on 10/2/2023), Disp: , Rfl:     docusate sodium (Colace) 100 MG capsule, Take 1 capsule by mouth every night at bedtime.,  "Disp: 30 capsule, Rfl: 2    lidocaine-prilocaine (EMLA) 2.5-2.5 % cream, Apply 1 application  topically to the appropriate area as directed As Needed (apply 30-60 minutes prior to port access)., Disp: 30 g, Rfl: 2    omeprazole (priLOSEC) 40 MG capsule, Take 1 capsule by mouth Every Morning. (Patient not taking: Reported on 10/2/2023), Disp: , Rfl:     ondansetron (ZOFRAN) 8 MG tablet, Take 1 tablet by mouth 3 (Three) Times a Day As Needed for Nausea or Vomiting., Disp: 30 tablet, Rfl: 5    triamcinolone (KENALOG) 0.1 % ointment, Apply 1 application  topically to the appropriate area as directed 2 (Two) Times a Day., Disp: 30 g, Rfl: 0    vitamin B-12 (CYANOCOBALAMIN) 1000 MCG tablet, Take 1 tablet by mouth Daily., Disp: , Rfl:   No current facility-administered medications for this visit.    Facility-Administered Medications Ordered in Other Visits:     heparin injection 500 Units, 500 Units, Intravenous, PRN, Sophie Benito MD, 500 Units at 02/05/24 1104    sodium chloride 0.9 % flush 20 mL, 20 mL, Intravenous, PRN, Sophie Benito MD, 20 mL at 02/05/24 1104    No Known Allergies    Family History   Problem Relation Age of Onset    Diabetes Mother     Diabetes Father     Cancer Sister         Unknown of type       Cancer-related family history includes Cancer in his sister.      Social History     Tobacco Use    Smoking status: Every Day     Packs/day: 1     Types: Cigarettes   Vaping Use    Vaping Use: Never used   Substance Use Topics    Alcohol use: Never    Drug use: Never     Social History     Social History Narrative    Not on file       Objective:    Vital Signs:  Vitals:    02/05/24 0814   BP: (!) 89/62   Pulse: 91   Resp: 16   Temp: 98 °F (36.7 °C)   TempSrc: Oral   SpO2: 97%   Weight: 59.4 kg (131 lb)   Height: 185.4 cm (73\")   PainSc: 0-No pain         Body mass index is 17.28 kg/m².    ECOG  (1) Restricted in physically strenuous activity, ambulatory and able to do work of light nature    Physical Exam: "   Physical Exam  Constitutional:       Appearance: Normal appearance.   HENT:      Head: Normocephalic and atraumatic.   Eyes:      Pupils: Pupils are equal, round, and reactive to light.   Cardiovascular:      Rate and Rhythm: Normal rate and regular rhythm.      Pulses: Normal pulses.      Heart sounds: No murmur heard.  Pulmonary:      Effort: Pulmonary effort is normal.      Breath sounds: Normal breath sounds.   Abdominal:      General: There is no distension.      Palpations: Abdomen is soft. There is no mass.      Tenderness: There is no abdominal tenderness.   Musculoskeletal:         General: Normal range of motion.      Cervical back: Normal range of motion and neck supple.   Skin:     General: Skin is warm.      Findings: Rash (Resolving) present.   Neurological:      General: No focal deficit present.      Mental Status: He is alert and oriented to person, place, and time.   Psychiatric:         Mood and Affect: Mood normal.         Lab Results - Last 18 Months   Lab Units 02/05/24  0820 01/22/24  0810 01/08/24  0902   WBC 10*3/mm3 3.20* 4.43 3.76   HEMOGLOBIN g/dL 11.6* 11.3* 11.9*   HEMATOCRIT % 36.3* 35.6* 37.5   PLATELETS 10*3/mm3 142 181 277   MCV fL 93.6 92.5 91.9     Lab Results - Last 18 Months   Lab Units 02/05/24  0820 01/22/24  0810 01/08/24  0902   SODIUM mmol/L 126* 131* 134*   POTASSIUM mmol/L 4.3 4.4 4.2   CHLORIDE mmol/L 91* 96* 101   CO2 mmol/L 28.0 27.0 24.0   BUN mg/dL 12 15 11   CREATININE mg/dL 0.52* 0.51* 0.55*   CALCIUM mg/dL 9.5 9.5 9.2   BILIRUBIN mg/dL 0.3 0.2 0.2   ALK PHOS U/L 125* 107 100   ALT (SGPT) U/L 22 25 30   AST (SGOT) U/L 31 34 39   GLUCOSE mg/dL 85 85 126*       Lab Results   Component Value Date    GLUCOSE 85 02/05/2024    BUN 12 02/05/2024    CREATININE 0.52 (L) 02/05/2024    BCR 23.1 02/05/2024    K 4.3 02/05/2024    CO2 28.0 02/05/2024    CALCIUM 9.5 02/05/2024    ALBUMIN 3.9 02/05/2024    AST 31 02/05/2024    ALT 22 02/05/2024       Lab Results - Last 18 Months  "  Lab Units 08/29/23  1031   INR  0.94   APTT seconds 28.6       No results found for: \"IRON\", \"TIBC\", \"FERRITIN\"    No results found for: \"FOLATE\"    No results found for: \"OCCULTBLD\"    No results found for: \"RETICCTPCT\"  No results found for: \"TRZRANBP71\"  No results found for: \"SPEP\", \"UPEP\"  No results found for: \"LDH\", \"URICACID\"  No results found for: \"LEO\", \"RF\", \"SEDRATE\"  No results found for: \"FIBRINOGEN\", \"HAPTOGLOBIN\"  Lab Results   Component Value Date    PTT 28.6 08/29/2023    INR 0.94 08/29/2023     No results found for: \"\"  No results found for: \"CEA\"  No components found for: \"CA-19-9\"  No results found for: \"PSA\"  No results found for: \"SEDRATE\"       Assessment & Plan     Patient is a 73-year-old male with esophageal adenocarcinoma of the distal esophagus.    Esophageal adenocarcinoma  Given CT imaging there is concern for metastatic lymph node in the mesentery  PET/CT with hypermetabolic lymph node, metastasis in the lumbar spine L4  Case discussed at our multidisciplinary tumor board conference    S/p biopsy of spinal lesion with metastatic disease.   Discussed starting treatment with FOLFOX opdivo given negative HER and NGS profile though PDL1 is low CPS 3  Started FOLFOX had significant chemo-induced nausea  Now improved added emend   Clinical improvement with swallowing  Increased fatigue, myelosupression decrease oxaliplatin to 65 mg/m2  CT imaging with good response to treatment next in 2/2024  Now with continued myelosupression, decrease dose of oxaliplatin to 50 mg/m2 next week plan  Follow-up with Dr. Benito in 2 weeks after CT      Chemotherapy induced nausea vomiting  Patient is on emend continue the same    Poor appetite  Continues to have taste changes weight is stable    Myelosuppression  Discontinue 5-FU push  Decrease dose of oxaliplatin  Hold FOLFOX today due to ANC of 880 and return in 1 week  Will discuss dose modification with Dr. Benito for next " cycle    Hypothyroid  Subclinical at this point  monitor    Rash  Newly developed over the past 2 weeks progressive rash covering over 50% of the body  Pruritic  No new medications  Considered immune mediated.  Discussed with Dr. Benito and will hold Opdivo until rash is less than 10% of body surface area  Topical steroids, emollients, OTC antihistamine for itching as needed  And call for worsening or nonresolution  Continue Opdivo with next treatment    Constipation  Having hard stools approximately every 3 days  Advised to increase fluid intake  Continue MiraLAX every morning and add Colace nightly daily  Resolved    Thank you very much for providing the opportunity to participate in this patient’s care. Please do not hesitate to call if there are any other questions.

## 2024-02-05 ENCOUNTER — HOSPITAL ENCOUNTER (OUTPATIENT)
Dept: ONCOLOGY | Facility: HOSPITAL | Age: 74
Discharge: HOME | End: 2024-02-05
Admitting: INTERNAL MEDICINE
Payer: MEDICARE

## 2024-02-05 ENCOUNTER — OFFICE VISIT (OUTPATIENT)
Dept: ONCOLOGY | Facility: CLINIC | Age: 74
End: 2024-02-05
Payer: MEDICARE

## 2024-02-05 VITALS
RESPIRATION RATE: 16 BRPM | HEART RATE: 91 BPM | HEIGHT: 73 IN | BODY MASS INDEX: 17.36 KG/M2 | DIASTOLIC BLOOD PRESSURE: 62 MMHG | OXYGEN SATURATION: 97 % | SYSTOLIC BLOOD PRESSURE: 89 MMHG | WEIGHT: 131 LBS | TEMPERATURE: 98 F

## 2024-02-05 VITALS
HEART RATE: 78 BPM | HEIGHT: 73 IN | SYSTOLIC BLOOD PRESSURE: 121 MMHG | DIASTOLIC BLOOD PRESSURE: 67 MMHG | WEIGHT: 131 LBS | TEMPERATURE: 98 F | RESPIRATION RATE: 16 BRPM | OXYGEN SATURATION: 97 % | BODY MASS INDEX: 17.36 KG/M2

## 2024-02-05 DIAGNOSIS — C79.51 METASTATIC CANCER TO SPINE: ICD-10-CM

## 2024-02-05 DIAGNOSIS — C15.9 ESOPHAGEAL ADENOCARCINOMA: Primary | ICD-10-CM

## 2024-02-05 DIAGNOSIS — E86.0 DEHYDRATION: ICD-10-CM

## 2024-02-05 DIAGNOSIS — K22.89 ESOPHAGEAL MASS: Primary | ICD-10-CM

## 2024-02-05 DIAGNOSIS — R21 RASH: ICD-10-CM

## 2024-02-05 DIAGNOSIS — K59.00 CONSTIPATION, UNSPECIFIED CONSTIPATION TYPE: ICD-10-CM

## 2024-02-05 LAB
ALBUMIN SERPL-MCNC: 3.9 G/DL (ref 3.5–5.2)
ALBUMIN/GLOB SERPL: 1.3 G/DL
ALP SERPL-CCNC: 125 U/L (ref 39–117)
ALT SERPL W P-5'-P-CCNC: 22 U/L (ref 1–41)
ANION GAP SERPL CALCULATED.3IONS-SCNC: 7 MMOL/L (ref 5–15)
AST SERPL-CCNC: 31 U/L (ref 1–40)
BASOPHILS # BLD AUTO: 0.02 10*3/MM3 (ref 0–0.2)
BASOPHILS NFR BLD AUTO: 0.6 % (ref 0–1.5)
BILIRUB SERPL-MCNC: 0.3 MG/DL (ref 0–1.2)
BUN SERPL-MCNC: 12 MG/DL (ref 8–23)
BUN/CREAT SERPL: 23.1 (ref 7–25)
CALCIUM SPEC-SCNC: 9.5 MG/DL (ref 8.6–10.5)
CHLORIDE SERPL-SCNC: 91 MMOL/L (ref 98–107)
CO2 SERPL-SCNC: 28 MMOL/L (ref 22–29)
CREAT SERPL-MCNC: 0.52 MG/DL (ref 0.76–1.27)
DEPRECATED RDW RBC AUTO: 63.8 FL (ref 37–54)
EGFRCR SERPLBLD CKD-EPI 2021: 107.1 ML/MIN/1.73
EOSINOPHIL # BLD AUTO: 0.15 10*3/MM3 (ref 0–0.4)
EOSINOPHIL NFR BLD AUTO: 4.7 % (ref 0.3–6.2)
ERYTHROCYTE [DISTWIDTH] IN BLOOD BY AUTOMATED COUNT: 19 % (ref 12.3–15.4)
GLOBULIN UR ELPH-MCNC: 3.1 GM/DL
GLUCOSE BLDC GLUCOMTR-MCNC: 85 MG/DL (ref 70–105)
GLUCOSE SERPL-MCNC: 85 MG/DL (ref 65–99)
HCT VFR BLD AUTO: 36.3 % (ref 37.5–51)
HGB BLD-MCNC: 11.6 G/DL (ref 13–17.7)
LYMPHOCYTES # BLD AUTO: 1.28 10*3/MM3 (ref 0.7–3.1)
LYMPHOCYTES NFR BLD AUTO: 40 % (ref 19.6–45.3)
MCH RBC QN AUTO: 29.9 PG (ref 26.6–33)
MCHC RBC AUTO-ENTMCNC: 32 G/DL (ref 31.5–35.7)
MCV RBC AUTO: 93.6 FL (ref 79–97)
MONOCYTES # BLD AUTO: 0.87 10*3/MM3 (ref 0.1–0.9)
MONOCYTES NFR BLD AUTO: 27.2 % (ref 5–12)
NEUTROPHILS NFR BLD AUTO: 0.88 10*3/MM3 (ref 1.7–7)
NEUTROPHILS NFR BLD AUTO: 27.5 % (ref 42.7–76)
PLATELET # BLD AUTO: 142 10*3/MM3 (ref 140–450)
PMV BLD AUTO: 10.7 FL (ref 6–12)
POTASSIUM SERPL-SCNC: 4.3 MMOL/L (ref 3.5–5.2)
PROT SERPL-MCNC: 7 G/DL (ref 6–8.5)
RBC # BLD AUTO: 3.88 10*6/MM3 (ref 4.14–5.8)
SODIUM SERPL-SCNC: 126 MMOL/L (ref 136–145)
T4 FREE SERPL-MCNC: 1.23 NG/DL (ref 0.93–1.7)
TSH SERPL DL<=0.05 MIU/L-ACNC: 5.15 UIU/ML (ref 0.27–4.2)
WBC NRBC COR # BLD AUTO: 3.2 10*3/MM3 (ref 3.4–10.8)

## 2024-02-05 PROCEDURE — 82948 REAGENT STRIP/BLOOD GLUCOSE: CPT

## 2024-02-05 PROCEDURE — 1159F MED LIST DOCD IN RCRD: CPT | Performed by: NURSE PRACTITIONER

## 2024-02-05 PROCEDURE — 80053 COMPREHEN METABOLIC PANEL: CPT | Performed by: INTERNAL MEDICINE

## 2024-02-05 PROCEDURE — 25810000003 SODIUM CHLORIDE 0.9 % SOLUTION: Performed by: NURSE PRACTITIONER

## 2024-02-05 PROCEDURE — 96360 HYDRATION IV INFUSION INIT: CPT

## 2024-02-05 PROCEDURE — 99214 OFFICE O/P EST MOD 30 MIN: CPT | Performed by: NURSE PRACTITIONER

## 2024-02-05 PROCEDURE — 25010000002 HEPARIN LOCK FLUSH PER 10 UNITS: Performed by: INTERNAL MEDICINE

## 2024-02-05 PROCEDURE — 84443 ASSAY THYROID STIM HORMONE: CPT | Performed by: INTERNAL MEDICINE

## 2024-02-05 PROCEDURE — 96361 HYDRATE IV INFUSION ADD-ON: CPT

## 2024-02-05 PROCEDURE — 84439 ASSAY OF FREE THYROXINE: CPT | Performed by: INTERNAL MEDICINE

## 2024-02-05 PROCEDURE — 85025 COMPLETE CBC W/AUTO DIFF WBC: CPT | Performed by: INTERNAL MEDICINE

## 2024-02-05 PROCEDURE — 1160F RVW MEDS BY RX/DR IN RCRD: CPT | Performed by: NURSE PRACTITIONER

## 2024-02-05 PROCEDURE — 1126F AMNT PAIN NOTED NONE PRSNT: CPT | Performed by: NURSE PRACTITIONER

## 2024-02-05 RX ORDER — SODIUM CHLORIDE 0.9 % (FLUSH) 0.9 %
20 SYRINGE (ML) INJECTION AS NEEDED
OUTPATIENT
Start: 2024-02-05

## 2024-02-05 RX ORDER — SODIUM CHLORIDE 0.9 % (FLUSH) 0.9 %
20 SYRINGE (ML) INJECTION AS NEEDED
Status: DISCONTINUED | OUTPATIENT
Start: 2024-02-05 | End: 2024-02-06 | Stop reason: HOSPADM

## 2024-02-05 RX ORDER — HEPARIN SODIUM (PORCINE) LOCK FLUSH IV SOLN 100 UNIT/ML 100 UNIT/ML
500 SOLUTION INTRAVENOUS AS NEEDED
OUTPATIENT
Start: 2024-02-05

## 2024-02-05 RX ORDER — HEPARIN SODIUM (PORCINE) LOCK FLUSH IV SOLN 100 UNIT/ML 100 UNIT/ML
500 SOLUTION INTRAVENOUS AS NEEDED
Status: DISCONTINUED | OUTPATIENT
Start: 2024-02-05 | End: 2024-02-06 | Stop reason: HOSPADM

## 2024-02-05 RX ADMIN — Medication 20 ML: at 11:04

## 2024-02-05 RX ADMIN — HEPARIN 500 UNITS: 100 SYRINGE at 11:04

## 2024-02-05 RX ADMIN — SODIUM CHLORIDE 1000 ML: 9 INJECTION, SOLUTION INTRAVENOUS at 09:02

## 2024-02-05 NOTE — PROGRESS NOTES
Patient is here for C10D1 opdivo and folfox. Port accessed and flushed with good blood return noted. 10cc of blood wasted prior to specimen collection. Blood specimen obtained and sent to lab for processing per protocol.  Port flushed with saline and heparin prior to needle removal. Karen BRAGA NP sent: Patient is here for C10D1 opdivo, folfox. Patient stated the rash is better. most the spots are either scabbed or look almost like bruise/scarred areas. he also stated he got mouth sores but they are gone now and blisters on his fingertips that hurt and were sore but have since went away. he still has tingling and numbness but the blister spots are gone. He stated the itching is finally 99% better but that the cream did not help. His labs are not in parameters- His ANC is 0.88 today.You see him today at 1130. Do you want me to add Dr. Benito since his ANC is low to see about treatment? his BP was also 89/62 and I am going to recheck it now-I used the smaller cuff than they did on admission due to his arms being very tiny. Sitting-110/70 HR 85, standing 105/66 HR 97, sitting 123/68 HR 84, laying 121/67 (78)-He did say he was lightheaded today. he also went to see an [facility] doctor who gave him some herbs to take to boost his immune system. he is unsure exactly which ones, but I thought there were ones they are not supposed to take? Karen BRAGA NP replied: Let's go ahead and do NS 1 liter over 2 hours. With an ANC of 0.88 will need to hold treatment x 1 week. I will d/w Dr. Benito. You are correct-we advise no herbal supplements but he can bring the bottles with him to his next appointment for pharmacy review to see if any would be OK. Patient verbalized understanding to stop taking the herbal medications and to bring them in with him next week to see which ones he can take. Patient also verbalized understanding for the plan for fluids and to hold treatment x 1 week. Patient tolerated fluids and was discharged and taken to the  [facility].

## 2024-02-06 ENCOUNTER — TELEPHONE (OUTPATIENT)
Dept: ONCOLOGY | Facility: CLINIC | Age: 74
End: 2024-02-06
Payer: MEDICARE

## 2024-02-06 NOTE — TELEPHONE ENCOUNTER
Spoke w/ pt and let him know that Dr. Benito would like for him to get some IVF's.  Pt is scheduled for a CT scan tomorrow at 1:00.  Pt added on for IVF's at 2:15 and v/u.

## 2024-02-07 ENCOUNTER — HOSPITAL ENCOUNTER (OUTPATIENT)
Dept: PET IMAGING | Facility: HOSPITAL | Age: 74
Discharge: HOME | End: 2024-02-07
Admitting: INTERNAL MEDICINE
Payer: MEDICARE

## 2024-02-07 ENCOUNTER — HOSPITAL ENCOUNTER (OUTPATIENT)
Dept: ONCOLOGY | Facility: HOSPITAL | Age: 74
Discharge: HOME | End: 2024-02-07
Admitting: INTERNAL MEDICINE
Payer: MEDICARE

## 2024-02-07 VITALS — DIASTOLIC BLOOD PRESSURE: 47 MMHG | SYSTOLIC BLOOD PRESSURE: 81 MMHG | HEART RATE: 85 BPM

## 2024-02-07 DIAGNOSIS — K22.89 ESOPHAGEAL MASS: ICD-10-CM

## 2024-02-07 DIAGNOSIS — C15.9 ESOPHAGEAL ADENOCARCINOMA: ICD-10-CM

## 2024-02-07 DIAGNOSIS — C78.89 METASTATIC ADENOCARCINOMA TO ESOPHAGUS: ICD-10-CM

## 2024-02-07 DIAGNOSIS — E86.0 DEHYDRATION: Primary | ICD-10-CM

## 2024-02-07 PROCEDURE — 96360 HYDRATION IV INFUSION INIT: CPT

## 2024-02-07 PROCEDURE — 25810000003 SODIUM CHLORIDE 0.9 % SOLUTION: Performed by: INTERNAL MEDICINE

## 2024-02-07 PROCEDURE — 25510000001 IOPAMIDOL PER 1 ML: Performed by: INTERNAL MEDICINE

## 2024-02-07 PROCEDURE — 71260 CT THORAX DX C+: CPT

## 2024-02-07 PROCEDURE — 74177 CT ABD & PELVIS W/CONTRAST: CPT

## 2024-02-07 RX ADMIN — IOPAMIDOL 100 ML: 755 INJECTION, SOLUTION INTRAVENOUS at 13:08

## 2024-02-07 RX ADMIN — SODIUM CHLORIDE 1000 ML: 9 INJECTION, SOLUTION INTRAVENOUS at 14:17

## 2024-02-12 ENCOUNTER — HOSPITAL ENCOUNTER (OUTPATIENT)
Dept: ONCOLOGY | Facility: HOSPITAL | Age: 74
Discharge: HOME | End: 2024-02-12
Admitting: INTERNAL MEDICINE
Payer: MEDICARE

## 2024-02-12 VITALS
HEART RATE: 98 BPM | WEIGHT: 132.5 LBS | BODY MASS INDEX: 17.56 KG/M2 | SYSTOLIC BLOOD PRESSURE: 133 MMHG | DIASTOLIC BLOOD PRESSURE: 79 MMHG | RESPIRATION RATE: 16 BRPM | TEMPERATURE: 97.7 F | HEIGHT: 73 IN | OXYGEN SATURATION: 97 %

## 2024-02-12 DIAGNOSIS — K22.89 ESOPHAGEAL MASS: Primary | ICD-10-CM

## 2024-02-12 LAB
ALBUMIN SERPL-MCNC: 4 G/DL (ref 3.5–5.2)
ALBUMIN/GLOB SERPL: 1.3 G/DL
ALP SERPL-CCNC: 123 U/L (ref 39–117)
ALT SERPL W P-5'-P-CCNC: 22 U/L (ref 1–41)
ANION GAP SERPL CALCULATED.3IONS-SCNC: 10 MMOL/L (ref 5–15)
AST SERPL-CCNC: 30 U/L (ref 1–40)
BASOPHILS # BLD AUTO: 0.03 10*3/MM3 (ref 0–0.2)
BASOPHILS NFR BLD AUTO: 0.5 % (ref 0–1.5)
BILIRUB SERPL-MCNC: 0.2 MG/DL (ref 0–1.2)
BUN SERPL-MCNC: 12 MG/DL (ref 8–23)
BUN/CREAT SERPL: 20.3 (ref 7–25)
CALCIUM SPEC-SCNC: 9.5 MG/DL (ref 8.6–10.5)
CHLORIDE SERPL-SCNC: 93 MMOL/L (ref 98–107)
CO2 SERPL-SCNC: 26 MMOL/L (ref 22–29)
CREAT SERPL-MCNC: 0.59 MG/DL (ref 0.76–1.27)
DEPRECATED RDW RBC AUTO: 64.8 FL (ref 37–54)
EGFRCR SERPLBLD CKD-EPI 2021: 103.1 ML/MIN/1.73
EOSINOPHIL # BLD AUTO: 0.59 10*3/MM3 (ref 0–0.4)
EOSINOPHIL NFR BLD AUTO: 10.5 % (ref 0.3–6.2)
ERYTHROCYTE [DISTWIDTH] IN BLOOD BY AUTOMATED COUNT: 18.9 % (ref 12.3–15.4)
GLOBULIN UR ELPH-MCNC: 3 GM/DL
GLUCOSE SERPL-MCNC: 132 MG/DL (ref 65–99)
HCT VFR BLD AUTO: 38.4 % (ref 37.5–51)
HGB BLD-MCNC: 12.2 G/DL (ref 13–17.7)
LYMPHOCYTES # BLD AUTO: 1.73 10*3/MM3 (ref 0.7–3.1)
LYMPHOCYTES NFR BLD AUTO: 30.7 % (ref 19.6–45.3)
MCH RBC QN AUTO: 30.1 PG (ref 26.6–33)
MCHC RBC AUTO-ENTMCNC: 31.8 G/DL (ref 31.5–35.7)
MCV RBC AUTO: 94.8 FL (ref 79–97)
MONOCYTES # BLD AUTO: 1.31 10*3/MM3 (ref 0.1–0.9)
MONOCYTES NFR BLD AUTO: 23.3 % (ref 5–12)
NEUTROPHILS NFR BLD AUTO: 1.97 10*3/MM3 (ref 1.7–7)
NEUTROPHILS NFR BLD AUTO: 35 % (ref 42.7–76)
PLATELET # BLD AUTO: 267 10*3/MM3 (ref 140–450)
PMV BLD AUTO: 9.8 FL (ref 6–12)
POTASSIUM SERPL-SCNC: 4.2 MMOL/L (ref 3.5–5.2)
PROT SERPL-MCNC: 7 G/DL (ref 6–8.5)
RBC # BLD AUTO: 4.05 10*6/MM3 (ref 4.14–5.8)
SODIUM SERPL-SCNC: 129 MMOL/L (ref 136–145)
T4 FREE SERPL-MCNC: 1.17 NG/DL (ref 0.93–1.7)
TSH SERPL DL<=0.05 MIU/L-ACNC: 5.59 UIU/ML (ref 0.27–4.2)
WBC NRBC COR # BLD AUTO: 5.63 10*3/MM3 (ref 3.4–10.8)

## 2024-02-12 PROCEDURE — 96416 CHEMO PROLONG INFUSE W/PUMP: CPT

## 2024-02-12 PROCEDURE — 25010000002 DEXAMETHASONE SODIUM PHOSPHATE 120 MG/30ML SOLUTION: Performed by: INTERNAL MEDICINE

## 2024-02-12 PROCEDURE — 96375 TX/PRO/DX INJ NEW DRUG ADDON: CPT

## 2024-02-12 PROCEDURE — 96367 TX/PROPH/DG ADDL SEQ IV INF: CPT

## 2024-02-12 PROCEDURE — 96413 CHEMO IV INFUSION 1 HR: CPT

## 2024-02-12 PROCEDURE — 25010000002 FOSAPREPITANT PER 1 MG: Performed by: INTERNAL MEDICINE

## 2024-02-12 PROCEDURE — 25810000003 SODIUM CHLORIDE 0.9 % SOLUTION 500 ML FLEX CONT: Performed by: INTERNAL MEDICINE

## 2024-02-12 PROCEDURE — 84439 ASSAY OF FREE THYROXINE: CPT | Performed by: INTERNAL MEDICINE

## 2024-02-12 PROCEDURE — 84443 ASSAY THYROID STIM HORMONE: CPT | Performed by: INTERNAL MEDICINE

## 2024-02-12 PROCEDURE — 0 DEXTROSE 5 % SOLUTION 250 ML FLEX CONT: Performed by: INTERNAL MEDICINE

## 2024-02-12 PROCEDURE — 25010000002 FLUOROURACIL PER 500 MG: Performed by: INTERNAL MEDICINE

## 2024-02-12 PROCEDURE — 96368 THER/DIAG CONCURRENT INF: CPT

## 2024-02-12 PROCEDURE — 0 DEXTROSE 5 % SOLUTION: Performed by: INTERNAL MEDICINE

## 2024-02-12 PROCEDURE — G0498 CHEMO EXTEND IV INFUS W/PUMP: HCPCS

## 2024-02-12 PROCEDURE — 25010000002 OXALIPLATIN PER 0.5 MG: Performed by: INTERNAL MEDICINE

## 2024-02-12 PROCEDURE — 25010000002 LEUCOVORIN CALCIUM PER 50 MG: Performed by: INTERNAL MEDICINE

## 2024-02-12 PROCEDURE — 85025 COMPLETE CBC W/AUTO DIFF WBC: CPT | Performed by: INTERNAL MEDICINE

## 2024-02-12 PROCEDURE — 96415 CHEMO IV INFUSION ADDL HR: CPT

## 2024-02-12 PROCEDURE — 80053 COMPREHEN METABOLIC PANEL: CPT | Performed by: INTERNAL MEDICINE

## 2024-02-12 PROCEDURE — 25010000002 PALONOSETRON 0.25 MG/5ML SOLUTION PREFILLED SYRINGE: Performed by: INTERNAL MEDICINE

## 2024-02-12 RX ORDER — PALONOSETRON 0.05 MG/ML
0.25 INJECTION, SOLUTION INTRAVENOUS ONCE
Status: CANCELLED | OUTPATIENT
Start: 2024-02-12

## 2024-02-12 RX ORDER — DIPHENHYDRAMINE HYDROCHLORIDE 50 MG/ML
50 INJECTION INTRAMUSCULAR; INTRAVENOUS AS NEEDED
Status: CANCELLED | OUTPATIENT
Start: 2024-02-12

## 2024-02-12 RX ORDER — DEXTROSE MONOHYDRATE 50 MG/ML
250 INJECTION, SOLUTION INTRAVENOUS ONCE
Status: CANCELLED | OUTPATIENT
Start: 2024-02-12

## 2024-02-12 RX ORDER — PALONOSETRON 0.05 MG/ML
0.25 INJECTION, SOLUTION INTRAVENOUS ONCE
Status: COMPLETED | OUTPATIENT
Start: 2024-02-12 | End: 2024-02-12

## 2024-02-12 RX ORDER — DEXTROSE MONOHYDRATE 50 MG/ML
250 INJECTION, SOLUTION INTRAVENOUS ONCE
Status: COMPLETED | OUTPATIENT
Start: 2024-02-12 | End: 2024-02-12

## 2024-02-12 RX ORDER — FAMOTIDINE 10 MG/ML
20 INJECTION, SOLUTION INTRAVENOUS AS NEEDED
Status: CANCELLED | OUTPATIENT
Start: 2024-02-12

## 2024-02-12 RX ADMIN — DEXTROSE MONOHYDRATE 250 ML: 50 INJECTION, SOLUTION INTRAVENOUS at 10:35

## 2024-02-12 RX ADMIN — FLUOROURACIL 4220 MG: 50 INJECTION, SOLUTION INTRAVENOUS at 14:20

## 2024-02-12 RX ADMIN — PALONOSETRON 0.25 MG: 0.25 INJECTION, SOLUTION INTRAVENOUS at 11:21

## 2024-02-12 RX ADMIN — DEXAMETHASONE SODIUM PHOSPHATE 12 MG: 4 INJECTION, SOLUTION INTRA-ARTICULAR; INTRALESIONAL; INTRAMUSCULAR; INTRAVENOUS; SOFT TISSUE at 11:22

## 2024-02-12 RX ADMIN — FOSAPREPITANT 100 ML: 150 INJECTION, POWDER, LYOPHILIZED, FOR SOLUTION INTRAVENOUS at 10:35

## 2024-02-12 RX ADMIN — DEXTROSE MONOHYDRATE 700 MG: 50 INJECTION, SOLUTION INTRAVENOUS at 12:05

## 2024-02-12 RX ADMIN — OXALIPLATIN 90 MG: 5 INJECTION, SOLUTION INTRAVENOUS at 12:06

## 2024-02-14 ENCOUNTER — HOSPITAL ENCOUNTER (OUTPATIENT)
Dept: ONCOLOGY | Facility: HOSPITAL | Age: 74
Discharge: HOME | End: 2024-02-14
Admitting: INTERNAL MEDICINE
Payer: MEDICARE

## 2024-02-14 VITALS
RESPIRATION RATE: 16 BRPM | DIASTOLIC BLOOD PRESSURE: 68 MMHG | OXYGEN SATURATION: 93 % | TEMPERATURE: 98.2 F | WEIGHT: 129.3 LBS | SYSTOLIC BLOOD PRESSURE: 115 MMHG | HEART RATE: 96 BPM | BODY MASS INDEX: 17.06 KG/M2

## 2024-02-14 DIAGNOSIS — K22.89 ESOPHAGEAL MASS: Primary | ICD-10-CM

## 2024-02-14 PROCEDURE — 96360 HYDRATION IV INFUSION INIT: CPT

## 2024-02-14 PROCEDURE — 25810000003 SODIUM CHLORIDE 0.9 % SOLUTION: Performed by: INTERNAL MEDICINE

## 2024-02-14 PROCEDURE — 25010000002 HEPARIN LOCK FLUSH PER 10 UNITS: Performed by: INTERNAL MEDICINE

## 2024-02-14 RX ORDER — SODIUM CHLORIDE 9 MG/ML
1000 INJECTION, SOLUTION INTRAVENOUS ONCE
Status: COMPLETED | OUTPATIENT
Start: 2024-02-14 | End: 2024-02-14

## 2024-02-14 RX ORDER — SODIUM CHLORIDE 0.9 % (FLUSH) 0.9 %
20 SYRINGE (ML) INJECTION AS NEEDED
OUTPATIENT
Start: 2024-02-14

## 2024-02-14 RX ORDER — HEPARIN SODIUM (PORCINE) LOCK FLUSH IV SOLN 100 UNIT/ML 100 UNIT/ML
500 SOLUTION INTRAVENOUS AS NEEDED
OUTPATIENT
Start: 2024-02-14

## 2024-02-14 RX ORDER — SODIUM CHLORIDE 0.9 % (FLUSH) 0.9 %
20 SYRINGE (ML) INJECTION AS NEEDED
Status: DISCONTINUED | OUTPATIENT
Start: 2024-02-14 | End: 2024-02-15 | Stop reason: HOSPADM

## 2024-02-14 RX ORDER — HEPARIN SODIUM (PORCINE) LOCK FLUSH IV SOLN 100 UNIT/ML 100 UNIT/ML
500 SOLUTION INTRAVENOUS AS NEEDED
Status: DISCONTINUED | OUTPATIENT
Start: 2024-02-14 | End: 2024-02-15 | Stop reason: HOSPADM

## 2024-02-14 RX ADMIN — Medication 20 ML: at 15:48

## 2024-02-14 RX ADMIN — HEPARIN 500 UNITS: 100 SYRINGE at 15:48

## 2024-02-14 RX ADMIN — SODIUM CHLORIDE 1000 ML/HR: 9 INJECTION, SOLUTION INTRAVENOUS at 14:39

## 2024-02-15 NOTE — PROGRESS NOTES
HEMATOLOGY ONCOLOGY OUTPATIENT FOLLOW UP       Patient name: Aravind Lim  : 1950  MRN: [id number]  Primary Care Physician: Abiodun Steele MD  Referring Physician: Abiodun Steele MD  Reason For Consult: Esophageal cancer      History of Present Illness:  Patient is a 73 y.o. male with recently diagnosed esophageal adenocarcinoma.    Patient has been having difficulty swallowing for the 5 months prior to presentation this is been progressing gradually.  Patient has been an active smoker with 50-pack-year smoking history.  He denies significant alcohol intake or GERD history.  His family history is positive for malignancy and 2 sisters and one nephew but he is unsure of the type of malignancy.  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  Enlarged mesenteric lymph node    2023 -PET/CT with large distal esophageal mass extending to the digastric cardia compatible with neoplasm.  Hypermetabolic node in the right thoracic inlet compatible with metastatic disease.  Bone lesion of L4 is most compatible with metastatic disease.  Largest lung nodule was not hypermetabolic likely benign process  CARIS NGS negative for HER 2, low TMB, FGFR2 amplified, PDL1 CPS 3      23 - CT guided biopsy of L4 - metastatic poorly differentiated carcinoma. Likely from esophageal primary.    2023 cycle 1 FOLFOX Opdivo  10/16/2023 - C 3 FOLFOX Opdivo.  10/30/2023 - C4 FOLFOX opdivo  23 - C5 FOLFOX opdivo decrease dose of oxaliplatin to 65 mg/m2  Continues to be on FOLFOX Opdivo  2023 -CT chest abdomen pelvis with decrease in size of the right supraclavicular  and right paratracheal lymphadenopathy.  Circumferential masslike thickening of the distal esophagus consistent with the known primary.  Stable central mesenteric lymphadenopathy.  Increased sclerosis of the L4 vertebral body lesion indicating posttreatment change.  Cholelithiasis.    Continue FOLFOX Opdivo, has had some rash, which improved after opdivo was held  2/12/24- FOLFOX with no opdivo.    2/7/24 - CT CAP - Moderate distal esophageal wall thickening, similar since the study dated 11/22/2023. Mesenteric lymph nodes appear mildly prominent and increased in number, and there is nonfocal stranding within the mesenteric fat. 2.4 x 0.8 cm nodular focus within the mesentery on series 2, image 47. Metastatic involvement cannot be excluded. 2.3 cm faint sclerotic lesion posteriorly within the L4 vertebral body, not significantly changed.     Subjective:  Rash is improved now. Mainly on his back. Eating ok, has had some weightloss    Past Medical History:   Diagnosis Date    Anemia 08/04/2023    Anxiety 06/07/2022    Bilateral cataracts 06/23/2023    Colon polyp     Constipation 05/03/2022    BM daily w/ mineral oil.    COPD (chronic obstructive pulmonary disease)     COVID-19 08/16/2022    Dysphagia     Fatigue 05/03/2022    GERD (gastroesophageal reflux disease)     History of hiatal hernia     Impacted cerumen of right ear 05/03/2022    Prediabetes 05/10/2022       No past surgical history on file.      Current Outpatient Medications:     albuterol sulfate  (90 Base) MCG/ACT inhaler, Inhale 2 puffs Every 4 (Four) Hours As Needed., Disp: , Rfl:     budesonide-formoterol (SYMBICORT) 160-4.5 MCG/ACT inhaler, Inhale 2 puffs 2 (Two) Times a Day., Disp: , Rfl:     docusate sodium (Colace) 100 MG capsule, Take 1 capsule by mouth every night at bedtime., Disp: 30 capsule, Rfl: 2    ondansetron (ZOFRAN) 8 MG tablet, Take 1 tablet by mouth 3 (Three) Times a Day As Needed for Nausea or Vomiting., Disp: 30 tablet, Rfl:  "5    triamcinolone (KENALOG) 0.1 % ointment, Apply 1 application  topically to the appropriate area as directed 2 (Two) Times a Day., Disp: 30 g, Rfl: 0    vitamin B-12 (CYANOCOBALAMIN) 1000 MCG tablet, Take 1 tablet by mouth Daily., Disp: , Rfl:     Cholecalciferol 50 MCG (2000 UT) tablet, Take 1 tablet by mouth Daily. (Patient not taking: Reported on 10/2/2023), Disp: , Rfl:     No Known Allergies    Family History   Problem Relation Age of Onset    Diabetes Mother     Diabetes Father     Cancer Sister         Unknown of type       Cancer-related family history includes Cancer in his sister.      Social History     Tobacco Use    Smoking status: Every Day     Packs/day: 1     Types: Cigarettes   Vaping Use    Vaping Use: Never used   Substance Use Topics    Alcohol use: Never    Drug use: Never     Social History     Social History Narrative    Not on file       Objective:    Vital Signs:  Vitals:    02/19/24 0931   BP: 106/71   Pulse: 97   Resp: 16   Temp: 97.8 °F (36.6 °C)   SpO2: 98%   Weight: 58.4 kg (128 lb 12.8 oz)   Height: 185.4 cm (73\")   PainSc: 0-No pain         Body mass index is 16.99 kg/m².    ECOG  (1) Restricted in physically strenuous activity, ambulatory and able to do work of light nature    Physical Exam:   Physical Exam  Constitutional:       Appearance: Normal appearance.   HENT:      Head: Normocephalic and atraumatic.   Eyes:      Pupils: Pupils are equal, round, and reactive to light.   Cardiovascular:      Rate and Rhythm: Normal rate and regular rhythm.      Pulses: Normal pulses.      Heart sounds: No murmur heard.  Pulmonary:      Effort: Pulmonary effort is normal.      Breath sounds: Normal breath sounds.   Abdominal:      General: There is no distension.      Palpations: Abdomen is soft. There is no mass.      Tenderness: There is no abdominal tenderness.   Musculoskeletal:         General: Normal range of motion.      Cervical back: Normal range of motion and neck supple.   Skin:   " "  General: Skin is warm.   Neurological:      General: No focal deficit present.      Mental Status: He is alert and oriented to person, place, and time.   Psychiatric:         Mood and Affect: Mood normal.         Lab Results - Last 18 Months   Lab Units 02/12/24  0850 02/05/24  0820 01/22/24  0810   WBC 10*3/mm3 5.63 3.20* 4.43   HEMOGLOBIN g/dL 12.2* 11.6* 11.3*   HEMATOCRIT % 38.4 36.3* 35.6*   PLATELETS 10*3/mm3 267 142 181   MCV fL 94.8 93.6 92.5     Lab Results - Last 18 Months   Lab Units 02/12/24  0915 02/05/24  0820 01/22/24  0810   SODIUM mmol/L 129* 126* 131*   POTASSIUM mmol/L 4.2 4.3 4.4   CHLORIDE mmol/L 93* 91* 96*   CO2 mmol/L 26.0 28.0 27.0   BUN mg/dL 12 12 15   CREATININE mg/dL 0.59* 0.52* 0.51*   CALCIUM mg/dL 9.5 9.5 9.5   BILIRUBIN mg/dL 0.2 0.3 0.2   ALK PHOS U/L 123* 125* 107   ALT (SGPT) U/L 22 22 25   AST (SGOT) U/L 30 31 34   GLUCOSE mg/dL 132* 85 85       Lab Results   Component Value Date    GLUCOSE 132 (H) 02/12/2024    BUN 12 02/12/2024    CREATININE 0.59 (L) 02/12/2024    BCR 20.3 02/12/2024    K 4.2 02/12/2024    CO2 26.0 02/12/2024    CALCIUM 9.5 02/12/2024    ALBUMIN 4.0 02/12/2024    AST 30 02/12/2024    ALT 22 02/12/2024       Lab Results - Last 18 Months   Lab Units 08/29/23  1031   INR  0.94   APTT seconds 28.6       No results found for: \"IRON\", \"TIBC\", \"FERRITIN\"    No results found for: \"FOLATE\"    No results found for: \"OCCULTBLD\"    No results found for: \"RETICCTPCT\"  No results found for: \"UGHPPHAA62\"  No results found for: \"SPEP\", \"UPEP\"  No results found for: \"LDH\", \"URICACID\"  No results found for: \"LEO\", \"RF\", \"SEDRATE\"  No results found for: \"FIBRINOGEN\", \"HAPTOGLOBIN\"  Lab Results   Component Value Date    PTT 28.6 08/29/2023    INR 0.94 08/29/2023     No results found for: \"\"  No results found for: \"CEA\"  No components found for: \"CA-19-9\"  No results found for: \"PSA\"  No results found for: \"SEDRATE\"       Assessment & Plan     Patient is a 73-year-old male " with esophageal adenocarcinoma of the distal esophagus.    Esophageal adenocarcinoma  Given CT imaging there is concern for metastatic lymph node in the mesentery  PET/CT with hypermetabolic lymph node, metastasis in the lumbar spine L4  Case discussed at our multidisciplinary tumor board conference    S/p biopsy of spinal lesion with metastatic disease.   Discussed starting treatment with FOLFOX opdivo given negative HER and NGS profile though PDL1 is low CPS 3  Started FOLFOX had significant chemo-induced nausea  Now improved added emend   Clinical improvement with swallowing  Increased fatigue, myelosupression decrease oxaliplatin to 65 mg/m2  CT imaging with good response to treatment next in 2/2024  Now with continued myelosupression, decrease dose of oxaliplatin to 50 mg/m2   Will add back the opdivo for next treatment, was on hold with rash    Rash  Likely immune mediated, now G1, restart immunotherapy  Use OTC hydrocortisone as needed.    Chemotherapy induced nausea vomiting  Patient is on emend continue the same    Poor appetite  Continues to have taste changes weight is stable    Myelosuppression  Discontinue 5-FU push  Decrease dose of oxaliplatin.  Now stable    Hypothyroid  Subclinical at this point  Monitor now      Thank you very much for providing the opportunity to participate in this patient’s care. Please do not hesitate to call if there are any other questions.    F/u in 3 weeks

## 2024-02-19 ENCOUNTER — OFFICE VISIT (OUTPATIENT)
Dept: ONCOLOGY | Facility: CLINIC | Age: 74
End: 2024-02-19
Payer: MEDICARE

## 2024-02-19 VITALS
WEIGHT: 128.8 LBS | OXYGEN SATURATION: 98 % | HEIGHT: 73 IN | RESPIRATION RATE: 16 BRPM | TEMPERATURE: 97.8 F | SYSTOLIC BLOOD PRESSURE: 106 MMHG | DIASTOLIC BLOOD PRESSURE: 71 MMHG | BODY MASS INDEX: 17.07 KG/M2 | HEART RATE: 97 BPM

## 2024-02-19 DIAGNOSIS — K22.89 ESOPHAGEAL MASS: Primary | ICD-10-CM

## 2024-02-19 PROCEDURE — 1159F MED LIST DOCD IN RCRD: CPT | Performed by: INTERNAL MEDICINE

## 2024-02-19 PROCEDURE — 99214 OFFICE O/P EST MOD 30 MIN: CPT | Performed by: INTERNAL MEDICINE

## 2024-02-19 PROCEDURE — G2211 COMPLEX E/M VISIT ADD ON: HCPCS | Performed by: INTERNAL MEDICINE

## 2024-02-19 PROCEDURE — 1126F AMNT PAIN NOTED NONE PRSNT: CPT | Performed by: INTERNAL MEDICINE

## 2024-02-19 PROCEDURE — 1160F RVW MEDS BY RX/DR IN RCRD: CPT | Performed by: INTERNAL MEDICINE

## 2024-02-19 RX ORDER — FAMOTIDINE 10 MG/ML
20 INJECTION, SOLUTION INTRAVENOUS AS NEEDED
OUTPATIENT
Start: 2024-03-04

## 2024-02-19 RX ORDER — PALONOSETRON 0.05 MG/ML
0.25 INJECTION, SOLUTION INTRAVENOUS ONCE
OUTPATIENT
Start: 2024-03-04

## 2024-02-19 RX ORDER — SODIUM CHLORIDE 9 MG/ML
1000 INJECTION, SOLUTION INTRAVENOUS ONCE
Start: 2024-03-06 | End: 2024-02-28

## 2024-02-19 RX ORDER — DIPHENHYDRAMINE HYDROCHLORIDE 50 MG/ML
50 INJECTION INTRAMUSCULAR; INTRAVENOUS AS NEEDED
OUTPATIENT
Start: 2024-03-04

## 2024-02-19 RX ORDER — DEXTROSE MONOHYDRATE 50 MG/ML
250 INJECTION, SOLUTION INTRAVENOUS ONCE
OUTPATIENT
Start: 2024-03-04

## 2024-02-26 ENCOUNTER — HOSPITAL ENCOUNTER (OUTPATIENT)
Dept: ONCOLOGY | Facility: HOSPITAL | Age: 74
Discharge: HOME | End: 2024-02-26
Admitting: INTERNAL MEDICINE
Payer: MEDICARE

## 2024-02-26 VITALS
TEMPERATURE: 96.9 F | OXYGEN SATURATION: 98 % | HEART RATE: 68 BPM | WEIGHT: 129.5 LBS | DIASTOLIC BLOOD PRESSURE: 72 MMHG | BODY MASS INDEX: 17.09 KG/M2 | SYSTOLIC BLOOD PRESSURE: 123 MMHG

## 2024-02-26 DIAGNOSIS — E86.0 DEHYDRATION: ICD-10-CM

## 2024-02-26 DIAGNOSIS — K22.89 ESOPHAGEAL MASS: Primary | ICD-10-CM

## 2024-02-26 LAB
ALBUMIN SERPL-MCNC: 3.9 G/DL (ref 3.5–5.2)
ALBUMIN/GLOB SERPL: 1.2 G/DL
ALP SERPL-CCNC: 148 U/L (ref 39–117)
ALT SERPL W P-5'-P-CCNC: 22 U/L (ref 1–41)
ANION GAP SERPL CALCULATED.3IONS-SCNC: 10 MMOL/L (ref 5–15)
AST SERPL-CCNC: 27 U/L (ref 1–40)
BASOPHILS # BLD AUTO: 0.01 10*3/MM3 (ref 0–0.2)
BASOPHILS NFR BLD AUTO: 0.3 % (ref 0–1.5)
BILIRUB SERPL-MCNC: 0.2 MG/DL (ref 0–1.2)
BUN SERPL-MCNC: 11 MG/DL (ref 8–23)
BUN/CREAT SERPL: 17.7 (ref 7–25)
CALCIUM SPEC-SCNC: 9.7 MG/DL (ref 8.6–10.5)
CHLORIDE SERPL-SCNC: 92 MMOL/L (ref 98–107)
CO2 SERPL-SCNC: 27 MMOL/L (ref 22–29)
CREAT SERPL-MCNC: 0.62 MG/DL (ref 0.76–1.27)
DEPRECATED RDW RBC AUTO: 55.1 FL (ref 37–54)
EGFRCR SERPLBLD CKD-EPI 2021: 101.6 ML/MIN/1.73
EOSINOPHIL # BLD AUTO: 0.43 10*3/MM3 (ref 0–0.4)
EOSINOPHIL NFR BLD AUTO: 14 % (ref 0.3–6.2)
ERYTHROCYTE [DISTWIDTH] IN BLOOD BY AUTOMATED COUNT: 16.7 % (ref 12.3–15.4)
GLOBULIN UR ELPH-MCNC: 3.2 GM/DL
GLUCOSE BLDC GLUCOMTR-MCNC: 106 MG/DL (ref 70–105)
GLUCOSE SERPL-MCNC: 99 MG/DL (ref 65–99)
HCT VFR BLD AUTO: 38.3 % (ref 37.5–51)
HGB BLD-MCNC: 12.4 G/DL (ref 13–17.7)
LYMPHOCYTES # BLD AUTO: 1.34 10*3/MM3 (ref 0.7–3.1)
LYMPHOCYTES NFR BLD AUTO: 43.6 % (ref 19.6–45.3)
MCH RBC QN AUTO: 30.1 PG (ref 26.6–33)
MCHC RBC AUTO-ENTMCNC: 32.4 G/DL (ref 31.5–35.7)
MCV RBC AUTO: 93 FL (ref 79–97)
MONOCYTES # BLD AUTO: 0.69 10*3/MM3 (ref 0.1–0.9)
MONOCYTES NFR BLD AUTO: 22.5 % (ref 5–12)
NEUTROPHILS NFR BLD AUTO: 0.6 10*3/MM3 (ref 1.7–7)
NEUTROPHILS NFR BLD AUTO: 19.6 % (ref 42.7–76)
PLATELET # BLD AUTO: 212 10*3/MM3 (ref 140–450)
PMV BLD AUTO: 10 FL (ref 6–12)
POTASSIUM SERPL-SCNC: 4.4 MMOL/L (ref 3.5–5.2)
PROT SERPL-MCNC: 7.1 G/DL (ref 6–8.5)
RBC # BLD AUTO: 4.12 10*6/MM3 (ref 4.14–5.8)
SODIUM SERPL-SCNC: 129 MMOL/L (ref 136–145)
WBC NRBC COR # BLD AUTO: 3.07 10*3/MM3 (ref 3.4–10.8)

## 2024-02-26 PROCEDURE — 85025 COMPLETE CBC W/AUTO DIFF WBC: CPT | Performed by: INTERNAL MEDICINE

## 2024-02-26 PROCEDURE — 25010000002 HEPARIN LOCK FLUSH PER 10 UNITS: Performed by: INTERNAL MEDICINE

## 2024-02-26 PROCEDURE — 80053 COMPREHEN METABOLIC PANEL: CPT | Performed by: INTERNAL MEDICINE

## 2024-02-26 PROCEDURE — 82948 REAGENT STRIP/BLOOD GLUCOSE: CPT | Performed by: INTERNAL MEDICINE

## 2024-02-26 PROCEDURE — 25810000003 SODIUM CHLORIDE 0.9 % SOLUTION: Performed by: INTERNAL MEDICINE

## 2024-02-26 PROCEDURE — 96360 HYDRATION IV INFUSION INIT: CPT

## 2024-02-26 RX ORDER — SODIUM CHLORIDE 0.9 % (FLUSH) 0.9 %
20 SYRINGE (ML) INJECTION AS NEEDED
Status: DISCONTINUED | OUTPATIENT
Start: 2024-02-26 | End: 2024-02-27 | Stop reason: HOSPADM

## 2024-02-26 RX ORDER — SODIUM CHLORIDE 0.9 % (FLUSH) 0.9 %
20 SYRINGE (ML) INJECTION AS NEEDED
OUTPATIENT
Start: 2024-02-26

## 2024-02-26 RX ORDER — HEPARIN SODIUM (PORCINE) LOCK FLUSH IV SOLN 100 UNIT/ML 100 UNIT/ML
500 SOLUTION INTRAVENOUS AS NEEDED
Status: DISCONTINUED | OUTPATIENT
Start: 2024-02-26 | End: 2024-02-27 | Stop reason: HOSPADM

## 2024-02-26 RX ORDER — HEPARIN SODIUM (PORCINE) LOCK FLUSH IV SOLN 100 UNIT/ML 100 UNIT/ML
500 SOLUTION INTRAVENOUS AS NEEDED
OUTPATIENT
Start: 2024-02-26

## 2024-02-26 RX ADMIN — HEPARIN 500 UNITS: 100 SYRINGE at 11:44

## 2024-02-26 RX ADMIN — SODIUM CHLORIDE 1000 ML: 900 INJECTION, SOLUTION INTRAVENOUS at 10:35

## 2024-02-26 RX ADMIN — Medication 10 ML: at 11:44

## 2024-02-26 NOTE — PROGRESS NOTES
Pt here for C11 D1 opdivo, folfox. Using sterile technique, pt's port accessed for blood collection. 10 ml blood wasted prior to collecting blood for labs. Pt's anc 0.60 today and he states he's been dizzy since Sat. Informed Dr Benito and order received to hold treatment for 1 week, check orthostatic bp and give 1 L NS. Pt tolerated fluids, AVS given and he verbalized understanding.

## 2024-03-04 ENCOUNTER — HOSPITAL ENCOUNTER (OUTPATIENT)
Dept: ONCOLOGY | Facility: HOSPITAL | Age: 74
Discharge: HOME | End: 2024-03-04
Admitting: INTERNAL MEDICINE
Payer: MEDICARE

## 2024-03-04 ENCOUNTER — HOSPITAL ENCOUNTER (OUTPATIENT)
Dept: PET IMAGING | Facility: HOSPITAL | Age: 74
Discharge: HOME | End: 2024-03-04
Admitting: INTERNAL MEDICINE
Payer: MEDICARE

## 2024-03-04 VITALS
WEIGHT: 130 LBS | TEMPERATURE: 96.9 F | DIASTOLIC BLOOD PRESSURE: 69 MMHG | BODY MASS INDEX: 17.23 KG/M2 | SYSTOLIC BLOOD PRESSURE: 115 MMHG | HEIGHT: 73 IN | HEART RATE: 99 BPM | OXYGEN SATURATION: 96 %

## 2024-03-04 DIAGNOSIS — C15.9 ESOPHAGEAL ADENOCARCINOMA: ICD-10-CM

## 2024-03-04 DIAGNOSIS — C78.89 METASTATIC ADENOCARCINOMA TO ESOPHAGUS: ICD-10-CM

## 2024-03-04 DIAGNOSIS — K22.89 ESOPHAGEAL MASS: ICD-10-CM

## 2024-03-04 DIAGNOSIS — R10.9 ABDOMINAL PAIN, UNSPECIFIED ABDOMINAL LOCATION: ICD-10-CM

## 2024-03-04 DIAGNOSIS — R10.9 ABDOMINAL PAIN, UNSPECIFIED ABDOMINAL LOCATION: Primary | ICD-10-CM

## 2024-03-04 DIAGNOSIS — C15.9 ESOPHAGEAL ADENOCARCINOMA: Primary | ICD-10-CM

## 2024-03-04 LAB
ALP BLD-CCNC: 128 U/L (ref 53–128)
BASOPHILS # BLD AUTO: 0.07 10*3/MM3 (ref 0–0.2)
BASOPHILS NFR BLD AUTO: 1.1 % (ref 0–1.5)
BUN BLDA-MCNC: 13 MG/DL (ref 7–22)
CALCIUM BLD QL: 9.9 MG/DL (ref 8–10.3)
CHLORIDE BLDA-SCNC: 98 MMOL/L (ref 98–108)
CO2 BLDA-SCNC: 28 MMOL/L (ref 18–33)
CREAT BLDA-MCNC: 0.6 MG/DL (ref 0.6–1.2)
DEPRECATED RDW RBC AUTO: 51.7 FL (ref 37–54)
EGFRCR SERPLBLD CKD-EPI 2021: 102.6 ML/MIN/1.73
EOSINOPHIL # BLD AUTO: 1.2 10*3/MM3 (ref 0–0.4)
EOSINOPHIL NFR BLD AUTO: 18.3 % (ref 0.3–6.2)
ERYTHROCYTE [DISTWIDTH] IN BLOOD BY AUTOMATED COUNT: 16.2 % (ref 12.3–15.4)
GLUCOSE BLDC GLUCOMTR-MCNC: 90 MG/DL (ref 73–118)
HCT VFR BLD AUTO: 38 % (ref 37.5–51)
HGB BLD-MCNC: 12.4 G/DL (ref 13–17.7)
LYMPHOCYTES # BLD AUTO: 2.15 10*3/MM3 (ref 0.7–3.1)
LYMPHOCYTES NFR BLD AUTO: 32.9 % (ref 19.6–45.3)
MCH RBC QN AUTO: 30.1 PG (ref 26.6–33)
MCHC RBC AUTO-ENTMCNC: 32.6 G/DL (ref 31.5–35.7)
MCV RBC AUTO: 92.2 FL (ref 79–97)
MONOCYTES # BLD AUTO: 1.47 10*3/MM3 (ref 0.1–0.9)
MONOCYTES NFR BLD AUTO: 22.5 % (ref 5–12)
NEUTROPHILS NFR BLD AUTO: 1.65 10*3/MM3 (ref 1.7–7)
NEUTROPHILS NFR BLD AUTO: 25.2 % (ref 42.7–76)
PLATELET # BLD AUTO: 329 10*3/MM3 (ref 140–450)
PMV BLD AUTO: 9.5 FL (ref 6–12)
POC ALBUMIN: 3.2 G/L (ref 3.3–5.5)
POC ALT (SGPT): 31 U/L (ref 10–47)
POC AST (SGOT): 43 U/L (ref 11–38)
POC TOTAL BILIRUBIN: 0.6 MG/DL (ref 0.2–1.6)
POC TOTAL PROTEIN: 7.5 G/DL (ref 6.4–8.1)
POTASSIUM BLDA-SCNC: 4.4 MMOL/L (ref 3.6–5.1)
RBC # BLD AUTO: 4.12 10*6/MM3 (ref 4.14–5.8)
SODIUM BLD-SCNC: 134 MMOL/L (ref 128–145)
WBC NRBC COR # BLD AUTO: 6.54 10*3/MM3 (ref 3.4–10.8)

## 2024-03-04 PROCEDURE — 80053 COMPREHEN METABOLIC PANEL: CPT

## 2024-03-04 PROCEDURE — 36591 DRAW BLOOD OFF VENOUS DEVICE: CPT

## 2024-03-04 PROCEDURE — 85025 COMPLETE CBC W/AUTO DIFF WBC: CPT | Performed by: INTERNAL MEDICINE

## 2024-03-04 PROCEDURE — 74177 CT ABD & PELVIS W/CONTRAST: CPT

## 2024-03-04 PROCEDURE — 25010000002 HEPARIN LOCK FLUSH PER 10 UNITS: Performed by: INTERNAL MEDICINE

## 2024-03-04 PROCEDURE — 25510000001 IOPAMIDOL PER 1 ML: Performed by: INTERNAL MEDICINE

## 2024-03-04 RX ORDER — DEXTROSE MONOHYDRATE 50 MG/ML
250 INJECTION, SOLUTION INTRAVENOUS ONCE
Status: CANCELLED | OUTPATIENT
Start: 2024-03-06

## 2024-03-04 RX ORDER — DEXTROSE MONOHYDRATE 50 MG/ML
250 INJECTION, SOLUTION INTRAVENOUS ONCE
Status: DISCONTINUED | OUTPATIENT
Start: 2024-03-04 | End: 2024-03-04

## 2024-03-04 RX ORDER — PALONOSETRON 0.05 MG/ML
0.25 INJECTION, SOLUTION INTRAVENOUS ONCE
Status: DISCONTINUED | OUTPATIENT
Start: 2024-03-04 | End: 2024-03-04

## 2024-03-04 RX ORDER — HEPARIN SODIUM (PORCINE) LOCK FLUSH IV SOLN 100 UNIT/ML 100 UNIT/ML
500 SOLUTION INTRAVENOUS ONCE
Status: COMPLETED | OUTPATIENT
Start: 2024-03-04 | End: 2024-03-04

## 2024-03-04 RX ORDER — PALONOSETRON 0.05 MG/ML
0.25 INJECTION, SOLUTION INTRAVENOUS ONCE
Status: CANCELLED | OUTPATIENT
Start: 2024-03-06

## 2024-03-04 RX ADMIN — IOPAMIDOL 100 ML: 755 INJECTION, SOLUTION INTRAVENOUS at 11:09

## 2024-03-04 RX ADMIN — SODIUM CHLORIDE, PRESERVATIVE FREE 500 UNITS: 5 INJECTION INTRAVENOUS at 11:09

## 2024-03-04 NOTE — PROGRESS NOTES
Pt here for C11 opdivo/folfox. Port accessed using sterile technique with positive blood return noted. 10cc of blood wasted prior to obtaining lab specimen per orders and flushed with 20cc normal saline.  Cbc, cmp resulted and WNL.  The following message sent to Dr. Benito:  Pt here for C11 Folfox with emend for his esophageal cancer. CBC and CMP resulted and WNL. He has a f/u appt with you on Wednesday. Pt was held last week due to low ANC. Pt states he has been taking 2 tylenol (unsure of dose) every 3 hours for abdomen/back pain. I told him that tylenol can harm his liver and he needs to only take as prescribed on the bottle. He states the pain has increased in the past 2 weeks and that is why he has increased the tylenol usage but the tylenol does control the pain. Just wanted to make sure it is still ok to treat today and if there is anything else he should use for pain? His tx plan is signed.     Per Dr. Benito- order to delay chemo until later this week and do CT scan.  Tyra RN attached to message to coordinate scan.  Pt notified of plan of care and chemotherapy appt made on Wednesday with Dr. Benito f/u appt.   Able to do scan today.  Port deaccessed and reaccessed with power port needle per request on Hiral in CT scan.  Pt d/c to basement to have CT scan completed with AVS given.

## 2024-03-05 NOTE — PROGRESS NOTES
HEMATOLOGY ONCOLOGY OUTPATIENT FOLLOW UP       Patient name: Aravind Lim  : 1950  MRN: [id number]  Primary Care Physician: Abiodun Steele MD  Referring Physician: Abiodun Steele MD  Reason For Consult: Esophageal cancer      History of Present Illness:  Patient is a 73 y.o. male with recently diagnosed esophageal adenocarcinoma.    Patient has been having difficulty swallowing for the 5 months prior to presentation this is been progressing gradually.  Patient has been an active smoker with 50-pack-year smoking history.  He denies significant alcohol intake or GERD history.  His family history is positive for malignancy and 2 sisters and one nephew but he is unsure of the type of malignancy.  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  Enlarged mesenteric lymph node    2023 -PET/CT with large distal esophageal mass extending to the digastric cardia compatible with neoplasm.  Hypermetabolic node in the right thoracic inlet compatible with metastatic disease.  Bone lesion of L4 is most compatible with metastatic disease.  Largest lung nodule was not hypermetabolic likely benign process  CARIS NGS negative for HER 2, low TMB, FGFR2 amplified, PDL1 CPS 3      23 - CT guided biopsy of L4 - metastatic poorly differentiated carcinoma. Likely from esophageal primary.    2023 cycle 1 FOLFOX Opdivo  10/16/2023 - C 3 FOLFOX Opdivo.  10/30/2023 - C4 FOLFOX opdivo  23 - C5 FOLFOX opdivo decrease dose of oxaliplatin to 65 mg/m2  Continues to be on FOLFOX Opdivo  2023 -CT chest abdomen pelvis with decrease in size of the right supraclavicular  and right paratracheal lymphadenopathy.  Circumferential masslike thickening of the distal esophagus consistent with the known primary.  Stable central mesenteric lymphadenopathy.  Increased sclerosis of the L4 vertebral body lesion indicating posttreatment change.  Cholelithiasis.    Continue FOLFOX Opdivo, has had some rash, which improved after opdivo was held  2/12/24- FOLFOX with no opdivo.    2/7/24 - CT CAP - Moderate distal esophageal wall thickening, similar since the study dated 11/22/2023. Mesenteric lymph nodes appear mildly prominent and increased in number, and there is nonfocal stranding within the mesenteric fat. 2.4 x 0.8 cm nodular focus within the mesentery on series 2, image 47. Metastatic involvement cannot be excluded. 2.3 cm faint sclerotic lesion posteriorly within the L4 vertebral body, not significantly changed.     3/4/24 - CT CAP with abdomina pain - Left mid abdominal small bowel thickening is new since 2/7/2024, nonspecific but favored to represent infectious/inflammatory enteritis. Central mesenteric stranding with mildly prominent central mesenteric lymph nodes, unchanged from the prior examination. No new or progressive adenopathy is seen. Lower thoracic esophageal abnormal concentric thickening corresponds to the patient's known malignancy, and appears grossly unchanged, to the extent that it is included in the imaging field-of-view. 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 Moderate to large colonic stool burden    Subjective:  Abdominal pain is improved now has pain in the left hip., lower back.     Past Medical History:   Diagnosis Date    Anemia 08/04/2023    Anxiety 06/07/2022    Bilateral cataracts 06/23/2023    Colon polyp     Constipation 05/03/2022    BM daily w/ mineral oil.    COPD (chronic obstructive pulmonary disease)     COVID-19 08/16/2022    Dysphagia     Fatigue 05/03/2022    GERD (gastroesophageal reflux disease)     History of hiatal hernia      Impacted cerumen of right ear 05/03/2022    Prediabetes 05/10/2022       No past surgical history on file.      Current Outpatient Medications:     albuterol sulfate  (90 Base) MCG/ACT inhaler, Inhale 2 puffs Every 4 (Four) Hours As Needed., Disp: , Rfl:     budesonide-formoterol (SYMBICORT) 160-4.5 MCG/ACT inhaler, Inhale 2 puffs 2 (Two) Times a Day., Disp: , Rfl:     docusate sodium (Colace) 100 MG capsule, Take 1 capsule by mouth every night at bedtime., Disp: 30 capsule, Rfl: 2    ondansetron (ZOFRAN) 8 MG tablet, Take 1 tablet by mouth 3 (Three) Times a Day As Needed for Nausea or Vomiting., Disp: 30 tablet, Rfl: 5    triamcinolone (KENALOG) 0.1 % ointment, Apply 1 application  topically to the appropriate area as directed 2 (Two) Times a Day., Disp: 30 g, Rfl: 0    Cholecalciferol 50 MCG (2000 UT) tablet, Take 1 tablet by mouth Daily. (Patient not taking: Reported on 10/2/2023), Disp: , Rfl:     vitamin B-12 (CYANOCOBALAMIN) 1000 MCG tablet, Take 1 tablet by mouth Daily. (Patient not taking: Reported on 3/4/2024), Disp: , Rfl:   No current facility-administered medications for this visit.    Facility-Administered Medications Ordered in Other Visits:     dexAMETHasone (DECADRON) IVPB 12 mg, 12 mg, Intravenous, Once, Sophie Benito MD    dextrose (D5W) 5 % infusion 250 mL, 250 mL, Intravenous, Once, Sophie Benito MD    fluorouracil (ADRUCIL) 4,220 mg in sodium chloride 0.9 % 240 mL chemo infusion - FOR HOME USE, 2,400 mg/m2 (Treatment Plan Recorded), Intravenous, Once, Sophie Benito MD    FOSAPREPITANT 150 MG/100ML NORMAL SALINE (CBC) IVPB 100 mL 100 mL, 150 mg, Intravenous, Once, Sophie Benito MD    leucovorin 700 mg in dextrose (D5W) 5 % 285 mL IVPB, 400 mg/m2 (Treatment Plan Recorded), Intravenous, Once, Sophie Benito MD    Nivolumab (OPDIVO) 240 mg in sodium chloride 0.9 % 124 mL chemo IVPB, 240 mg, Intravenous, Once, Sophie Benito MD    OXALIplatin (ELOXATIN) 90 mg in dextrose (D5W) 5 % 268 mL chemo  "IVPB, 50 mg/m2 (Treatment Plan Recorded), Intravenous, Once, Sohpie Benito MD    palonosetron (ALOXI) injection 0.25 mg, 0.25 mg, Intravenous, Once, Sophie Benito MD    No Known Allergies    Family History   Problem Relation Age of Onset    Diabetes Mother     Diabetes Father     Cancer Sister         Unknown of type       Cancer-related family history includes Cancer in his sister.      Social History     Tobacco Use    Smoking status: Every Day     Current packs/day: 1.00     Types: Cigarettes   Vaping Use    Vaping status: Never Used   Substance Use Topics    Alcohol use: Never    Drug use: Never     Social History     Social History Narrative    Not on file       Objective:    Vital Signs:  Vitals:    03/06/24 0915   BP: 109/60   Pulse: 96   Resp: 16   Temp: 97.5 °F (36.4 °C)   SpO2: 97%   Weight: 59.9 kg (132 lb)   Height: 185.4 cm (73\")   PainSc: 0-No pain       Body mass index is 17.42 kg/m².    ECOG  (1) Restricted in physically strenuous activity, ambulatory and able to do work of light nature    Physical Exam:   Physical Exam  Constitutional:       Appearance: Normal appearance.   HENT:      Head: Normocephalic and atraumatic.   Eyes:      Pupils: Pupils are equal, round, and reactive to light.   Cardiovascular:      Rate and Rhythm: Normal rate and regular rhythm.      Pulses: Normal pulses.      Heart sounds: No murmur heard.  Pulmonary:      Effort: Pulmonary effort is normal.      Breath sounds: Normal breath sounds.   Abdominal:      General: There is no distension.      Palpations: Abdomen is soft. There is no mass.      Tenderness: There is no abdominal tenderness.   Musculoskeletal:         General: Normal range of motion.      Cervical back: Normal range of motion and neck supple.   Skin:     General: Skin is warm.   Neurological:      General: No focal deficit present.      Mental Status: He is alert and oriented to person, place, and time.   Psychiatric:         Mood and Affect: Mood normal. " "        Lab Results - Last 18 Months   Lab Units 03/06/24  0930 03/04/24  0823 02/26/24  0906   WBC 10*3/mm3 8.51 6.54 3.07*   HEMOGLOBIN g/dL 12.3* 12.4* 12.4*   HEMATOCRIT % 37.6 38.0 38.3   PLATELETS 10*3/mm3 287 329 212   MCV fL 93.8 92.2 93.0     Lab Results - Last 18 Months   Lab Units 03/04/24  0832 02/26/24  0906 02/12/24  0915 02/05/24  0820   SODIUM mmol/L  --  129* 129* 126*   POTASSIUM mmol/L  --  4.4 4.2 4.3   CHLORIDE mmol/L  --  92* 93* 91*   CO2 mmol/L  --  27.0 26.0 28.0   BUN mg/dL  --  11 12 12   CREATININE mg/dL 0.60 0.62* 0.59* 0.52*   CALCIUM mg/dL  --  9.7 9.5 9.5   BILIRUBIN mg/dL  --  0.2 0.2 0.3   ALK PHOS U/L  --  148* 123* 125*   ALT (SGPT) U/L  --  22 22 22   AST (SGOT) U/L  --  27 30 31   GLUCOSE mg/dL  --  99 132* 85       Lab Results   Component Value Date    GLUCOSE 99 02/26/2024    BUN 11 02/26/2024    CREATININE 0.60 03/04/2024    BCR 17.7 02/26/2024    K 4.4 02/26/2024    CO2 27.0 02/26/2024    CALCIUM 9.7 02/26/2024    ALBUMIN 3.9 02/26/2024    AST 27 02/26/2024    ALT 22 02/26/2024       Lab Results - Last 18 Months   Lab Units 08/29/23  1031   INR  0.94   APTT seconds 28.6       No results found for: \"IRON\", \"TIBC\", \"FERRITIN\"    No results found for: \"FOLATE\"    No results found for: \"OCCULTBLD\"    No results found for: \"RETICCTPCT\"  No results found for: \"UINRQTJJ19\"  No results found for: \"SPEP\", \"UPEP\"  No results found for: \"LDH\", \"URICACID\"  No results found for: \"LEO\", \"RF\", \"SEDRATE\"  No results found for: \"FIBRINOGEN\", \"HAPTOGLOBIN\"  Lab Results   Component Value Date    PTT 28.6 08/29/2023    INR 0.94 08/29/2023     No results found for: \"\"  No results found for: \"CEA\"  No components found for: \"CA-19-9\"  No results found for: \"PSA\"  No results found for: \"SEDRATE\"       Assessment & Plan     Patient is a 73-year-old male with esophageal adenocarcinoma of the distal esophagus.    Esophageal adenocarcinoma  Given CT imaging there is concern for metastatic lymph " node in the mesentery  PET/CT with hypermetabolic lymph node, metastasis in the lumbar spine L4  Case discussed at our multidisciplinary tumor board conference    S/p biopsy of spinal lesion with metastatic disease.   Discussed starting treatment with FOLFOX opdivo given negative HER and NGS profile though PDL1 is low CPS 3  Started FOLFOX had significant chemo-induced nausea  Now improved added emend   Clinical improvement with swallowing  Increased fatigue, myelosupression decrease oxaliplatin to 65 mg/m2  CT imaging with good response to treatment next in 2/2024  Now with continued myelosupression, decrease dose of oxaliplatin to 50 mg/m2   Continue treatment with opdivo that was added  Recent CT with ongoing response repeat in 3 months    Left hip pain, likely bursitis based on distribution  No findings on recent CT  Could be arthritis related  Take ibuprofen/tylenol alternate  Pain clinic to discuss injection    Rash  Likely immune mediated, now G1, restart immunotherapy  Use OTC hydrocortisone as needed. Rash has resolved    Chemotherapy induced nausea vomiting  Patient is on emend continue the same    Poor appetite  Continues to have taste changes weight is stable    Myelosuppression  Discontinue 5-FU push  Decrease dose of oxaliplatin.  Now stable    Hypothyroid  Subclinical at this point  No change monitor    Neuropathy  Stable  Still able to button, write  Improves after treatment    Thank you very much for providing the opportunity to participate in this patient’s care. Please do not hesitate to call if there are any other questions.    F/u in 2 weeks with NP/PA    Time spent on encounter including record review, history taking, exam, discussion, counseling and documentation at: 40 minutes

## 2024-03-06 ENCOUNTER — APPOINTMENT (OUTPATIENT)
Dept: LAB | Facility: HOSPITAL | Age: 74
End: 2024-03-06
Payer: MEDICARE

## 2024-03-06 ENCOUNTER — HOSPITAL ENCOUNTER (OUTPATIENT)
Dept: ONCOLOGY | Facility: HOSPITAL | Age: 74
Discharge: HOME | End: 2024-03-06
Payer: MEDICARE

## 2024-03-06 ENCOUNTER — OFFICE VISIT (OUTPATIENT)
Dept: ONCOLOGY | Facility: CLINIC | Age: 74
End: 2024-03-06
Payer: MEDICARE

## 2024-03-06 ENCOUNTER — APPOINTMENT (OUTPATIENT)
Dept: ONCOLOGY | Facility: HOSPITAL | Age: 74
End: 2024-03-06
Payer: MEDICARE

## 2024-03-06 VITALS
DIASTOLIC BLOOD PRESSURE: 60 MMHG | HEIGHT: 73 IN | OXYGEN SATURATION: 97 % | BODY MASS INDEX: 17.49 KG/M2 | HEART RATE: 96 BPM | RESPIRATION RATE: 16 BRPM | WEIGHT: 132 LBS | SYSTOLIC BLOOD PRESSURE: 109 MMHG | TEMPERATURE: 97.5 F

## 2024-03-06 VITALS
RESPIRATION RATE: 16 BRPM | HEART RATE: 96 BPM | TEMPERATURE: 97.5 F | OXYGEN SATURATION: 97 % | SYSTOLIC BLOOD PRESSURE: 109 MMHG | WEIGHT: 132 LBS | HEIGHT: 73 IN | BODY MASS INDEX: 17.49 KG/M2 | DIASTOLIC BLOOD PRESSURE: 60 MMHG

## 2024-03-06 DIAGNOSIS — C15.9 ESOPHAGEAL ADENOCARCINOMA: ICD-10-CM

## 2024-03-06 DIAGNOSIS — K22.89 ESOPHAGEAL MASS: Primary | ICD-10-CM

## 2024-03-06 DIAGNOSIS — R10.9 ABDOMINAL PAIN, UNSPECIFIED ABDOMINAL LOCATION: ICD-10-CM

## 2024-03-06 DIAGNOSIS — M71.9 BURSITIS, UNSPECIFIED SITE: Primary | ICD-10-CM

## 2024-03-06 LAB
ALBUMIN SERPL-MCNC: 4.1 G/DL (ref 3.5–5.2)
ALBUMIN/GLOB SERPL: 1.2 G/DL
ALP SERPL-CCNC: 156 U/L (ref 39–117)
ALT SERPL W P-5'-P-CCNC: 34 U/L (ref 1–41)
ANION GAP SERPL CALCULATED.3IONS-SCNC: 11 MMOL/L (ref 5–15)
AST SERPL-CCNC: 42 U/L (ref 1–40)
BASOPHILS # BLD AUTO: 0.04 10*3/MM3 (ref 0–0.2)
BASOPHILS NFR BLD AUTO: 0.5 % (ref 0–1.5)
BILIRUB SERPL-MCNC: 0.2 MG/DL (ref 0–1.2)
BUN SERPL-MCNC: 13 MG/DL (ref 8–23)
BUN/CREAT SERPL: 27.1 (ref 7–25)
CALCIUM SPEC-SCNC: 9.3 MG/DL (ref 8.6–10.5)
CHLORIDE SERPL-SCNC: 92 MMOL/L (ref 98–107)
CO2 SERPL-SCNC: 25 MMOL/L (ref 22–29)
CREAT SERPL-MCNC: 0.48 MG/DL (ref 0.76–1.27)
DEPRECATED RDW RBC AUTO: 53.4 FL (ref 37–54)
EGFRCR SERPLBLD CKD-EPI 2021: 109.7 ML/MIN/1.73
EOSINOPHIL # BLD AUTO: 1.67 10*3/MM3 (ref 0–0.4)
EOSINOPHIL NFR BLD AUTO: 19.6 % (ref 0.3–6.2)
ERYTHROCYTE [DISTWIDTH] IN BLOOD BY AUTOMATED COUNT: 16.2 % (ref 12.3–15.4)
GLOBULIN UR ELPH-MCNC: 3.3 GM/DL
GLUCOSE BLDC GLUCOMTR-MCNC: 102 MG/DL (ref 70–105)
GLUCOSE SERPL-MCNC: 102 MG/DL (ref 65–99)
HCT VFR BLD AUTO: 37.6 % (ref 37.5–51)
HGB BLD-MCNC: 12.3 G/DL (ref 13–17.7)
LYMPHOCYTES # BLD AUTO: 2.31 10*3/MM3 (ref 0.7–3.1)
LYMPHOCYTES NFR BLD AUTO: 27.1 % (ref 19.6–45.3)
MCH RBC QN AUTO: 30.7 PG (ref 26.6–33)
MCHC RBC AUTO-ENTMCNC: 32.7 G/DL (ref 31.5–35.7)
MCV RBC AUTO: 93.8 FL (ref 79–97)
MONOCYTES # BLD AUTO: 1.27 10*3/MM3 (ref 0.1–0.9)
MONOCYTES NFR BLD AUTO: 14.9 % (ref 5–12)
NEUTROPHILS NFR BLD AUTO: 3.22 10*3/MM3 (ref 1.7–7)
NEUTROPHILS NFR BLD AUTO: 37.9 % (ref 42.7–76)
PLATELET # BLD AUTO: 287 10*3/MM3 (ref 140–450)
PMV BLD AUTO: 8.9 FL (ref 6–12)
POTASSIUM SERPL-SCNC: 4.3 MMOL/L (ref 3.5–5.2)
PROT SERPL-MCNC: 7.4 G/DL (ref 6–8.5)
RBC # BLD AUTO: 4.01 10*6/MM3 (ref 4.14–5.8)
SODIUM SERPL-SCNC: 128 MMOL/L (ref 136–145)
WBC NRBC COR # BLD AUTO: 8.51 10*3/MM3 (ref 3.4–10.8)

## 2024-03-06 PROCEDURE — 96368 THER/DIAG CONCURRENT INF: CPT

## 2024-03-06 PROCEDURE — 25010000002 DEXAMETHASONE SODIUM PHOSPHATE 120 MG/30ML SOLUTION: Performed by: INTERNAL MEDICINE

## 2024-03-06 PROCEDURE — 96375 TX/PRO/DX INJ NEW DRUG ADDON: CPT

## 2024-03-06 PROCEDURE — 82948 REAGENT STRIP/BLOOD GLUCOSE: CPT

## 2024-03-06 PROCEDURE — 25010000002 FLUOROURACIL PER 500 MG: Performed by: INTERNAL MEDICINE

## 2024-03-06 PROCEDURE — 96413 CHEMO IV INFUSION 1 HR: CPT

## 2024-03-06 PROCEDURE — 0 DEXTROSE 5 % SOLUTION 250 ML FLEX CONT: Performed by: INTERNAL MEDICINE

## 2024-03-06 PROCEDURE — 25010000002 PALONOSETRON 0.25 MG/5ML SOLUTION PREFILLED SYRINGE: Performed by: INTERNAL MEDICINE

## 2024-03-06 PROCEDURE — 96416 CHEMO PROLONG INFUSE W/PUMP: CPT

## 2024-03-06 PROCEDURE — 96417 CHEMO IV INFUS EACH ADDL SEQ: CPT

## 2024-03-06 PROCEDURE — 25010000002 NIVOLUMAB 240 MG/24ML SOLUTION 24 ML VIAL: Performed by: INTERNAL MEDICINE

## 2024-03-06 PROCEDURE — 25010000002 OXALIPLATIN PER 0.5 MG: Performed by: INTERNAL MEDICINE

## 2024-03-06 PROCEDURE — 96415 CHEMO IV INFUSION ADDL HR: CPT

## 2024-03-06 PROCEDURE — 85025 COMPLETE CBC W/AUTO DIFF WBC: CPT | Performed by: INTERNAL MEDICINE

## 2024-03-06 PROCEDURE — 0 DEXTROSE 5 % SOLUTION: Performed by: INTERNAL MEDICINE

## 2024-03-06 PROCEDURE — G0498 CHEMO EXTEND IV INFUS W/PUMP: HCPCS

## 2024-03-06 PROCEDURE — 25010000002 FOSAPREPITANT PER 1 MG: Performed by: INTERNAL MEDICINE

## 2024-03-06 PROCEDURE — 25010000002 LEUCOVORIN CALCIUM PER 50 MG: Performed by: INTERNAL MEDICINE

## 2024-03-06 PROCEDURE — 25810000003 SODIUM CHLORIDE 0.9 % SOLUTION 250 ML FLEX CONT: Performed by: INTERNAL MEDICINE

## 2024-03-06 PROCEDURE — 96366 THER/PROPH/DIAG IV INF ADDON: CPT

## 2024-03-06 PROCEDURE — 80053 COMPREHEN METABOLIC PANEL: CPT | Performed by: INTERNAL MEDICINE

## 2024-03-06 PROCEDURE — 96367 TX/PROPH/DG ADDL SEQ IV INF: CPT

## 2024-03-06 RX ORDER — PALONOSETRON 0.05 MG/ML
0.25 INJECTION, SOLUTION INTRAVENOUS ONCE
Status: COMPLETED | OUTPATIENT
Start: 2024-03-06 | End: 2024-03-06

## 2024-03-06 RX ORDER — DEXTROSE MONOHYDRATE 50 MG/ML
250 INJECTION, SOLUTION INTRAVENOUS ONCE
Status: COMPLETED | OUTPATIENT
Start: 2024-03-06 | End: 2024-03-06

## 2024-03-06 RX ADMIN — OXALIPLATIN 90 MG: 5 INJECTION, SOLUTION INTRAVENOUS at 12:23

## 2024-03-06 RX ADMIN — FLUOROURACIL 4220 MG: 50 INJECTION, SOLUTION INTRAVENOUS at 14:30

## 2024-03-06 RX ADMIN — DEXAMETHASONE SODIUM PHOSPHATE 12 MG: 4 INJECTION, SOLUTION INTRA-ARTICULAR; INTRALESIONAL; INTRAMUSCULAR; INTRAVENOUS; SOFT TISSUE at 11:57

## 2024-03-06 RX ADMIN — PALONOSETRON 0.25 MG: 0.25 INJECTION, SOLUTION INTRAVENOUS at 11:58

## 2024-03-06 RX ADMIN — FOSAPREPITANT 100 ML: 150 INJECTION, POWDER, LYOPHILIZED, FOR SOLUTION INTRAVENOUS at 11:17

## 2024-03-06 RX ADMIN — SODIUM CHLORIDE 240 MG: 9 INJECTION, SOLUTION INTRAVENOUS at 10:43

## 2024-03-06 RX ADMIN — DEXTROSE MONOHYDRATE 700 MG: 50 INJECTION, SOLUTION INTRAVENOUS at 12:22

## 2024-03-06 RX ADMIN — DEXTROSE MONOHYDRATE 250 ML: 50 INJECTION, SOLUTION INTRAVENOUS at 10:42

## 2024-03-07 ENCOUNTER — TELEPHONE (OUTPATIENT)
Dept: ONCOLOGY | Facility: CLINIC | Age: 74
End: 2024-03-07
Payer: MEDICARE

## 2024-03-07 DIAGNOSIS — E87.1 HYPONATREMIA: Primary | ICD-10-CM

## 2024-03-07 NOTE — TELEPHONE ENCOUNTER
Attempted to call pt to let him know that his sodium is low.  Per Karen CALZADA, pt needs to increase his dietary sodium intake and drink electrolyte water or gatorade.  Pt will need to have BMP recheck next week.  Voicemail left for pt to return my call.  Attempted to call wife's phone and it said the phone was unreachable.

## 2024-03-08 ENCOUNTER — DOCUMENTATION (OUTPATIENT)
Dept: ONCOLOGY | Facility: CLINIC | Age: 74
End: 2024-03-08
Payer: MEDICARE

## 2024-03-08 ENCOUNTER — HOSPITAL ENCOUNTER (OUTPATIENT)
Dept: ONCOLOGY | Facility: HOSPITAL | Age: 74
Discharge: HOME | End: 2024-03-08
Payer: MEDICARE

## 2024-03-08 VITALS
SYSTOLIC BLOOD PRESSURE: 100 MMHG | DIASTOLIC BLOOD PRESSURE: 71 MMHG | TEMPERATURE: 97.8 F | HEART RATE: 92 BPM | HEIGHT: 73 IN | OXYGEN SATURATION: 96 % | WEIGHT: 127 LBS | BODY MASS INDEX: 16.83 KG/M2 | RESPIRATION RATE: 16 BRPM

## 2024-03-08 DIAGNOSIS — K22.89 ESOPHAGEAL MASS: Primary | ICD-10-CM

## 2024-03-08 PROCEDURE — 25810000003 SODIUM CHLORIDE 0.9 % SOLUTION: Performed by: INTERNAL MEDICINE

## 2024-03-08 PROCEDURE — 25010000002 HEPARIN LOCK FLUSH PER 10 UNITS: Performed by: INTERNAL MEDICINE

## 2024-03-08 PROCEDURE — 96360 HYDRATION IV INFUSION INIT: CPT

## 2024-03-08 RX ORDER — HEPARIN SODIUM (PORCINE) LOCK FLUSH IV SOLN 100 UNIT/ML 100 UNIT/ML
500 SOLUTION INTRAVENOUS AS NEEDED
Status: DISCONTINUED | OUTPATIENT
Start: 2024-03-08 | End: 2024-03-09 | Stop reason: HOSPADM

## 2024-03-08 RX ORDER — SODIUM CHLORIDE 0.9 % (FLUSH) 0.9 %
20 SYRINGE (ML) INJECTION AS NEEDED
OUTPATIENT
Start: 2024-03-08

## 2024-03-08 RX ORDER — SODIUM CHLORIDE 0.9 % (FLUSH) 0.9 %
20 SYRINGE (ML) INJECTION AS NEEDED
Status: DISCONTINUED | OUTPATIENT
Start: 2024-03-08 | End: 2024-03-09 | Stop reason: HOSPADM

## 2024-03-08 RX ORDER — SODIUM CHLORIDE 9 MG/ML
1000 INJECTION, SOLUTION INTRAVENOUS ONCE
Status: COMPLETED | OUTPATIENT
Start: 2024-03-08 | End: 2024-03-08

## 2024-03-08 RX ORDER — HEPARIN SODIUM (PORCINE) LOCK FLUSH IV SOLN 100 UNIT/ML 100 UNIT/ML
500 SOLUTION INTRAVENOUS AS NEEDED
OUTPATIENT
Start: 2024-03-08

## 2024-03-08 RX ADMIN — HEPARIN 500 UNITS: 100 SYRINGE at 14:56

## 2024-03-08 RX ADMIN — Medication 20 ML: at 14:55

## 2024-03-08 RX ADMIN — SODIUM CHLORIDE 1000 ML/HR: 9 INJECTION, SOLUTION INTRAVENOUS at 13:46

## 2024-03-08 NOTE — PROGRESS NOTES
Met the pt in infusion today. Pt has experienced recent unintentional wt loss, nausea, vomiting, diarrhea, and constipation. Pt said he does not feel hungry, and spends most of his time sleeping which has contributed to his weight loss. Pt denies any difficulty chewing or swallowing. I recommended he continue eating high calorie snacks and meals provided by his wife and at least 2-3 ONS/day, pt said he will try. I recommended he set alarms to avoid going days without eating, pt said he can do this.     Enterade protocol reviewed. The pt is to have two bottles/day starting x 4 days, pt verbalized understanding. Eight bottles were given to the pt.    Rene Messina, MS,RD,LD-[place],CD-[place]  Registered Dietitian

## 2024-03-08 NOTE — PROGRESS NOTES
Patient is here for 5FU pump discontinue. 5FU pump empty, positive blood return noted and IVF's started. Karen BRAGA NP sent: Patient is here for pump discontinue and IVF's. Patient had missed a dose of opdivo due to the rash he was having. He did receive it with his FOLFOX on Wednesday and has the itchy red almost burn like marks back on his upper legs, hands, bilateral arms and hips. Patient stated the steroid cream he was given last time for this rash did not help. Patient also stated he had severe nausea on Wednesday night with cold sweats he almost vomited but did not. he stated he went to bed that night and has pretty much slept until today and has not eaten or drank much at all. He stated he will sometimes do the sleeping after the chemo but the severe nausea has only happened one other time. Patient stated he did have 2 very tiny episodes of diarrhea since Wednesday but sometimes his diarrhea in on and off between that and constipation. Patient is nauseated a tiny bit still today but stated it was tolerable and he did not want anything for it. I am pretty sure you saw his rash the last time he had it and it looks like that again. Just wondering if there was maybe another cream he could try. his weight did go from 132 to 127 from Wednesday to today. He said he would eat when he left today. Karen BRAGA NP replied: Rash: will need to be seen before any further immunotherapy. Needs to use Triamcinolone cream BID and can use emollient such as Aquaphor as needed between. Can use Claritin daily for itching and benadryl as needed for breakthrough itching. If is worsening prior to next week should contact the office. Weight loss: Weekly IVFs for now and can re-assess. High protein Ensure 1-2 daily. i to see. Rene came to see patient. Patient verbalized understanding for the plan and was discharged with AVS.

## 2024-03-08 NOTE — PATIENT INSTRUCTIONS
Rash: will need to be seen before any further immunotherapy. Needs to use Triamcinolone cream BID and can use emollient such as Aquaphor as needed between. Can use Claritin daily for itching and benadryl as needed for breakthrough itching. If is worsening prior to next week should contact the office.     Weekly IVFs for now and can re-assess. High protein Ensure 1-2 daily     Take 1-2 esure a day.

## 2024-03-13 ENCOUNTER — DOCUMENTATION (OUTPATIENT)
Dept: ONCOLOGY | Facility: CLINIC | Age: 74
End: 2024-03-13
Payer: MEDICARE

## 2024-03-13 ENCOUNTER — HOSPITAL ENCOUNTER (OUTPATIENT)
Dept: ONCOLOGY | Facility: HOSPITAL | Age: 74
Discharge: HOME | End: 2024-03-13
Admitting: INTERNAL MEDICINE
Payer: MEDICARE

## 2024-03-13 VITALS
HEART RATE: 91 BPM | OXYGEN SATURATION: 97 % | RESPIRATION RATE: 16 BRPM | BODY MASS INDEX: 17.28 KG/M2 | SYSTOLIC BLOOD PRESSURE: 120 MMHG | DIASTOLIC BLOOD PRESSURE: 69 MMHG | HEIGHT: 73 IN | WEIGHT: 130.4 LBS

## 2024-03-13 DIAGNOSIS — E86.0 DEHYDRATION: Primary | ICD-10-CM

## 2024-03-13 DIAGNOSIS — K22.89 ESOPHAGEAL MASS: ICD-10-CM

## 2024-03-13 PROCEDURE — G0463 HOSPITAL OUTPT CLINIC VISIT: HCPCS

## 2024-03-13 NOTE — PROGRESS NOTES
OSW was alerted that pt was considering discontinuing treatment due to lack of quality of life.  OSW met with pt.  Pt states that he is tired all the time and sleeps or stays in a chair.  He does not have the ability to complete desired tasks and feels like a burden to his wife.  OSW provided empathy and active listening.  OSW asked pt if he has discussed his feelings with his wife or other family.  He states he has and they all tell him it is his choice.  OSW suggested that additional resources could be sought to assist his wife if she is overwhelmed.  OSW informed pt that it is his choice about whether he continues treatment or not, but we want to make sure he has all the information.  OSW suggested that pt speak with Dr. Benito about his treatment regimen to see if any adjustments could be made to increase quality of life.  Pt was open to having that conversation.  Simultaneously, nursing staff was conversing with Dr. Benito regarding these issues and Dr. Benito was suggesting some adjustments.  Nursing staff came in and explained the new plan to pt and he was agreeable.  Pt was able to voice some goals he has and how he could work toward completing them.  Pt was thankful and hopeful.  OSW encouraged pt to reach out again if needed.

## 2024-03-13 NOTE — PROGRESS NOTES
"Pt is here today for IVF. Upon entering his room he asked me who he needs to talk to about discontinuing chemotherapy. Pt states he feels like he has no quality of life and states he would rather only have a couple of months to go out and do things. He states he just doesn't think the chemo is doing anything. The he states \"I don't know if I'm making the right decision\". Dr. Benito and Lakshmi MARQUEZ  notified. Lakshmi came and talked with pt. Dr. Benito suggested that we remove the Oxaliplatin moving forward with his tx. This was explained to the pt and pt agreed to continue tx without Oxaliplatin. Pharmacy notified about this change to pt's tx plan. Pt states he felt good and didn't want the IV fluids today. Pt's port was never accessed on today's appointment. Pt aware of next appointment. Pt given AVS and d/c by self.  "

## 2024-03-18 NOTE — PROGRESS NOTES
HEMATOLOGY ONCOLOGY OUTPATIENT FOLLOW UP       Patient name: Aravind Lim  : 1950  MRN: [id number]  Primary Care Physician: Abiodun Steele MD  Referring Physician: Abiodun Steele MD  Reason For Consult: Esophageal cancer      History of Present Illness:  Patient is a 73 y.o. male with recently diagnosed esophageal adenocarcinoma.    Patient has been having difficulty swallowing for the 5 months prior to presentation this is been progressing gradually.  Patient has been an active smoker with 50-pack-year smoking history.  He denies significant alcohol intake or GERD history.  His family history is positive for malignancy and 2 sisters and one nephew but he is unsure of the type of malignancy.  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  Enlarged mesenteric lymph node    2023 -PET/CT with large distal esophageal mass extending to the digastric cardia compatible with neoplasm.  Hypermetabolic node in the right thoracic inlet compatible with metastatic disease.  Bone lesion of L4 is most compatible with metastatic disease.  Largest lung nodule was not hypermetabolic likely benign process  CARIS NGS negative for HER 2, low TMB, FGFR2 amplified, PDL1 CPS 3      23 - CT guided biopsy of L4 - metastatic poorly differentiated carcinoma. Likely from esophageal primary.    2023 cycle 1 FOLFOX Opdivo  10/16/2023 - C 3 FOLFOX Opdivo.  10/30/2023 - C4 FOLFOX opdivo  23 - C5 FOLFOX opdivo decrease dose of oxaliplatin to 65 mg/m2  Continues to be on FOLFOX Opdivo  2023 -CT chest abdomen pelvis with decrease in size of the right supraclavicular  and right paratracheal lymphadenopathy.  Circumferential masslike thickening of the distal esophagus consistent with the known primary.  Stable central mesenteric lymphadenopathy.  Increased sclerosis of the L4 vertebral body lesion indicating posttreatment change.  Cholelithiasis.    Continue FOLFOX Opdivo, has had some rash, which improved after opdivo was held  2/12/24- FOLFOX with no opdivo.    2/7/24 - CT CAP - Moderate distal esophageal wall thickening, similar since the study dated 11/22/2023. Mesenteric lymph nodes appear mildly prominent and increased in number, and there is nonfocal stranding within the mesenteric fat. 2.4 x 0.8 cm nodular focus within the mesentery on series 2, image 47. Metastatic involvement cannot be excluded. 2.3 cm faint sclerotic lesion posteriorly within the L4 vertebral body, not significantly changed.     3/4/24 - CT CAP with abdomina pain - Left mid abdominal small bowel thickening is new since 2/7/2024, nonspecific but favored to represent infectious/inflammatory enteritis. Central mesenteric stranding with mildly prominent central mesenteric lymph nodes, unchanged from the prior examination. No new or progressive adenopathy is seen. Lower thoracic esophageal abnormal concentric thickening corresponds to the patient's known malignancy, and appears grossly unchanged, to the extent that it is included in the imaging field-of-view. 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 Moderate to large colonic stool burden    Subjective:  Aravind is here for his routine follow-up and cycle 12 chemo-immunotherapy.  Oxaliplatin is to be removed starting with this cycle.  He reports that he is doing relatively well.  Has had an increase in his appetite.  No complaints of nausea, vomiting, diarrhea.  He does occasionally have some mild diarrhea after treatment but has none currently.  His neuropathy is stable and his fingers and toes with no reported functional issues.  He does  note that he has had changes to his vision and that it seems more blurred.    Aravind Lim reports a pain score of 3.  Given his pain assessment as noted, treatment options were discussed and the following options were decided upon as a follow-up plan to address the patient's pain: continuation of current treatment plan for pain.     Past Medical History:   Diagnosis Date    Anemia 08/04/2023    Anxiety 06/07/2022    Bilateral cataracts 06/23/2023    Colon polyp     Constipation 05/03/2022    BM daily w/ mineral oil.    COPD (chronic obstructive pulmonary disease)     COVID-19 08/16/2022    Dysphagia     Fatigue 05/03/2022    GERD (gastroesophageal reflux disease)     History of hiatal hernia     Impacted cerumen of right ear 05/03/2022    Prediabetes 05/10/2022       History reviewed. No pertinent surgical history.      Current Outpatient Medications:     albuterol sulfate  (90 Base) MCG/ACT inhaler, Inhale 2 puffs Every 4 (Four) Hours As Needed., Disp: , Rfl:     budesonide-formoterol (SYMBICORT) 160-4.5 MCG/ACT inhaler, Inhale 2 puffs 2 (Two) Times a Day., Disp: , Rfl:     docusate sodium (Colace) 100 MG capsule, Take 1 capsule by mouth every night at bedtime., Disp: 30 capsule, Rfl: 2    Cholecalciferol 50 MCG (2000 UT) tablet, Take 1 tablet by mouth Daily. (Patient not taking: Reported on 10/2/2023), Disp: , Rfl:     ondansetron (ZOFRAN) 8 MG tablet, Take 1 tablet by mouth 3 (Three) Times a Day As Needed for Nausea or Vomiting. (Patient not taking: Reported on 3/20/2024), Disp: 30 tablet, Rfl: 5    triamcinolone (KENALOG) 0.1 % ointment, Apply 1 application  topically to the appropriate area as directed 2 (Two) Times a Day. (Patient not taking: Reported on 3/20/2024), Disp: 30 g, Rfl: 0    vitamin B-12 (CYANOCOBALAMIN) 1000 MCG tablet, Take 1 tablet by mouth Daily. (Patient not taking: Reported on 3/4/2024), Disp: , Rfl:   No current facility-administered medications for this  "visit.    Facility-Administered Medications Ordered in Other Visits:     dexAMETHasone (DECADRON) IVPB 12 mg, 12 mg, Intravenous, Once, Sophie Benito MD    dextrose (D5W) 5 % infusion 250 mL, 250 mL, Intravenous, Once, Sophie Benito MD    heparin injection 500 Units, 500 Units, Intravenous, PRN, Sophie Benito MD, 500 Units at 03/20/24 1202    leucovorin 700 mg in dextrose (D5W) 5 % 285 mL IVPB, 400 mg/m2 (Treatment Plan Recorded), Intravenous, Once, Sophie Benito MD    sodium chloride 0.9 % flush 20 mL, 20 mL, Intravenous, PRN, Sophie Benito MD, 20 mL at 03/20/24 1202    No Known Allergies    Family History   Problem Relation Age of Onset    Diabetes Mother     Diabetes Father     Cancer Sister         Unknown of type       Cancer-related family history includes Cancer in his sister.      Social History     Tobacco Use    Smoking status: Every Day     Current packs/day: 1.00     Types: Cigarettes   Vaping Use    Vaping status: Never Used   Substance Use Topics    Alcohol use: Never    Drug use: Never     Social History     Social History Narrative    Not on file       Objective:    Vital Signs:  Vitals:    03/20/24 1005   BP: 121/74   Pulse: 88   SpO2: 98%   Weight: 59.9 kg (132 lb)   Height: 185.4 cm (73\")   PainSc:   3   PainLoc: Back         Body mass index is 17.42 kg/m².    ECOG  (1) Restricted in physically strenuous activity, ambulatory and able to do work of light nature    Physical Exam:   Physical Exam  Constitutional:       Appearance: Normal appearance.   HENT:      Head: Normocephalic and atraumatic.   Eyes:      Pupils: Pupils are equal, round, and reactive to light.   Cardiovascular:      Rate and Rhythm: Normal rate and regular rhythm.      Pulses: Normal pulses.      Heart sounds: No murmur heard.  Pulmonary:      Effort: Pulmonary effort is normal.      Breath sounds: Normal breath sounds.   Abdominal:      General: There is no distension.      Palpations: Abdomen is soft. There is no mass.      " "Tenderness: There is no abdominal tenderness.   Musculoskeletal:         General: Normal range of motion.      Cervical back: Normal range of motion and neck supple.   Skin:     General: Skin is warm.   Neurological:      General: No focal deficit present.      Mental Status: He is alert and oriented to person, place, and time.   Psychiatric:         Mood and Affect: Mood normal.         Lab Results - Last 18 Months   Lab Units 03/20/24  0930 03/06/24  0930 03/04/24  0823   WBC 10*3/mm3 3.64 8.51 6.54   HEMOGLOBIN g/dL 11.4* 12.3* 12.4*   HEMATOCRIT % 36.0* 37.6 38.0   PLATELETS 10*3/mm3 174 287 329   MCV fL 94.2 93.8 92.2     Lab Results - Last 18 Months   Lab Units 03/20/24  1011 03/06/24  0930 03/04/24  0832 02/26/24  0906 02/12/24  0915   SODIUM mmol/L  --  128*  --  129* 129*   POTASSIUM mmol/L  --  4.3  --  4.4 4.2   CHLORIDE mmol/L  --  92*  --  92* 93*   CO2 mmol/L  --  25.0  --  27.0 26.0   BUN mg/dL  --  13  --  11 12   CREATININE mg/dL 0.50* 0.48* 0.60 0.62* 0.59*   CALCIUM mg/dL  --  9.3  --  9.7 9.5   BILIRUBIN mg/dL  --  0.2  --  0.2 0.2   ALK PHOS U/L  --  156*  --  148* 123*   ALT (SGPT) U/L  --  34  --  22 22   AST (SGOT) U/L  --  42*  --  27 30   GLUCOSE mg/dL  --  102*  --  99 132*       Lab Results   Component Value Date    GLUCOSE 102 (H) 03/06/2024    BUN 13 03/06/2024    CREATININE 0.50 (L) 03/20/2024    BCR 27.1 (H) 03/06/2024    K 4.3 03/06/2024    CO2 25.0 03/06/2024    CALCIUM 9.3 03/06/2024    ALBUMIN 4.1 03/06/2024    AST 42 (H) 03/06/2024    ALT 34 03/06/2024       Lab Results - Last 18 Months   Lab Units 08/29/23  1031   INR  0.94   APTT seconds 28.6       No results found for: \"IRON\", \"TIBC\", \"FERRITIN\"    No results found for: \"FOLATE\"    No results found for: \"OCCULTBLD\"    No results found for: \"RETICCTPCT\"  No results found for: \"KGMPVRTR05\"  No results found for: \"SPEP\", \"UPEP\"  No results found for: \"LDH\", \"URICACID\"  No results found for: \"LEO\", \"RF\", \"SEDRATE\"  No results " "found for: \"FIBRINOGEN\", \"HAPTOGLOBIN\"  Lab Results   Component Value Date    PTT 28.6 08/29/2023    INR 0.94 08/29/2023     No results found for: \"\"  No results found for: \"CEA\"  No components found for: \"CA-19-9\"  No results found for: \"PSA\"  No results found for: \"SEDRATE\"       Assessment & Plan     Patient is a 73-year-old male with esophageal adenocarcinoma of the distal esophagus.    Esophageal adenocarcinoma  Given CT imaging there is concern for metastatic lymph node in the mesentery  PET/CT with hypermetabolic lymph node, metastasis in the lumbar spine L4  Case discussed at our multidisciplinary tumor board conference    S/p biopsy of spinal lesion with metastatic disease.   Discussed starting treatment with FOLFOX opdivo given negative HER and NGS profile though PDL1 is low CPS 3  Started FOLFOX had significant chemo-induced nausea  Now improved added emend   Clinical improvement with swallowing  Increased fatigue, myelosupression decrease oxaliplatin to 65 mg/m2  CT imaging with good response to treatment next in 2/2024  Now with continued myelosupression, decrease dose of oxaliplatin to 50 mg/m2   Continue treatment with opdivo that was added  Recent CT with ongoing response repeat in 3 months  Delay treatment x 1 week for an ANC of 690  Continue Opdivo/5-FU and leucovorin    Left hip pain, likely bursitis based on distribution  No findings on recent CT  Could be arthritis related  Take ibuprofen/tylenol alternate  Pain clinic to discuss injection    Rash  Likely immune mediated, now G1, restart immunotherapy  Use OTC hydrocortisone as needed.   Rash has resolved    Chemotherapy induced nausea vomiting  Patient is on emend continue the same    Poor appetite  Continues to have taste changes weight is stable  Improving    Myelosuppression  Discontinue 5-FU push  Decrease dose of oxaliplatin.  Now stable    Hypothyroid  Subclinical at this point  No change monitor    Neuropathy  Stable  Still able to " button, write  Improves after treatment  Oxaliplatin has been omitted    Blurred vision  Patient reports decrease in his prescription/worsening vision  Advised ophthalmology follow-up, patient states he is already established and will call    Thank you very much for providing the opportunity to participate in this patient’s care. Please do not hesitate to call if there are any other questions.    Follow-up in 2 weeks with Dr. Benito or sooner if needed    Time spent on encounter including record review, history taking, exam, discussion, counseling and documentation at: 30 minutes

## 2024-03-19 DIAGNOSIS — K22.89 ESOPHAGEAL MASS: Primary | ICD-10-CM

## 2024-03-20 ENCOUNTER — OFFICE VISIT (OUTPATIENT)
Dept: ONCOLOGY | Facility: CLINIC | Age: 74
End: 2024-03-20
Payer: MEDICARE

## 2024-03-20 ENCOUNTER — HOSPITAL ENCOUNTER (OUTPATIENT)
Dept: ONCOLOGY | Facility: HOSPITAL | Age: 74
Discharge: HOME | End: 2024-03-20
Payer: MEDICARE

## 2024-03-20 VITALS
WEIGHT: 132 LBS | BODY MASS INDEX: 17.49 KG/M2 | HEART RATE: 88 BPM | HEIGHT: 73 IN | SYSTOLIC BLOOD PRESSURE: 121 MMHG | DIASTOLIC BLOOD PRESSURE: 74 MMHG | OXYGEN SATURATION: 98 %

## 2024-03-20 DIAGNOSIS — C15.9 ESOPHAGEAL ADENOCARCINOMA: Primary | ICD-10-CM

## 2024-03-20 DIAGNOSIS — Z51.11 CHEMOTHERAPY MANAGEMENT, ENCOUNTER FOR: ICD-10-CM

## 2024-03-20 DIAGNOSIS — K22.89 ESOPHAGEAL MASS: ICD-10-CM

## 2024-03-20 DIAGNOSIS — K22.89 ESOPHAGEAL MASS: Primary | ICD-10-CM

## 2024-03-20 LAB
ALP BLD-CCNC: 112 U/L (ref 53–128)
BASOPHILS # BLD AUTO: 0.02 10*3/MM3 (ref 0–0.2)
BASOPHILS NFR BLD AUTO: 0.5 % (ref 0–1.5)
BUN BLDA-MCNC: 14 MG/DL (ref 7–22)
CALCIUM BLD QL: 10.2 MG/DL (ref 8–10.3)
CHLORIDE BLDA-SCNC: 98 MMOL/L (ref 98–108)
CO2 BLDA-SCNC: 29 MMOL/L (ref 18–33)
CREAT BLDA-MCNC: 0.5 MG/DL (ref 0.6–1.2)
DEPRECATED RDW RBC AUTO: 51.1 FL (ref 37–54)
EGFRCR SERPLBLD CKD-EPI 2021: 108.4 ML/MIN/1.73
EOSINOPHIL # BLD AUTO: 1.09 10*3/MM3 (ref 0–0.4)
EOSINOPHIL NFR BLD AUTO: 29.9 % (ref 0.3–6.2)
ERYTHROCYTE [DISTWIDTH] IN BLOOD BY AUTOMATED COUNT: 15.6 % (ref 12.3–15.4)
GLUCOSE BLDC GLUCOMTR-MCNC: 96 MG/DL (ref 73–118)
HCT VFR BLD AUTO: 36 % (ref 37.5–51)
HGB BLD-MCNC: 11.4 G/DL (ref 13–17.7)
LYMPHOCYTES # BLD AUTO: 1.23 10*3/MM3 (ref 0.7–3.1)
LYMPHOCYTES NFR BLD AUTO: 33.8 % (ref 19.6–45.3)
MCH RBC QN AUTO: 29.8 PG (ref 26.6–33)
MCHC RBC AUTO-ENTMCNC: 31.7 G/DL (ref 31.5–35.7)
MCV RBC AUTO: 94.2 FL (ref 79–97)
MONOCYTES # BLD AUTO: 0.61 10*3/MM3 (ref 0.1–0.9)
MONOCYTES NFR BLD AUTO: 16.8 % (ref 5–12)
NEUTROPHILS NFR BLD AUTO: 0.69 10*3/MM3 (ref 1.7–7)
NEUTROPHILS NFR BLD AUTO: 19 % (ref 42.7–76)
PLATELET # BLD AUTO: 174 10*3/MM3 (ref 140–450)
PMV BLD AUTO: 10.5 FL (ref 6–12)
POC ALBUMIN: 3.1 G/L (ref 3.3–5.5)
POC ALT (SGPT): 23 U/L (ref 10–47)
POC AST (SGOT): 31 U/L (ref 11–38)
POC TOTAL BILIRUBIN: 0.5 MG/DL (ref 0.2–1.6)
POC TOTAL PROTEIN: 7.3 G/DL (ref 6.4–8.1)
POTASSIUM BLDA-SCNC: 4.2 MMOL/L (ref 3.6–5.1)
RBC # BLD AUTO: 3.82 10*6/MM3 (ref 4.14–5.8)
SODIUM BLD-SCNC: 138 MMOL/L (ref 128–145)
WBC NRBC COR # BLD AUTO: 3.64 10*3/MM3 (ref 3.4–10.8)

## 2024-03-20 PROCEDURE — 85025 COMPLETE CBC W/AUTO DIFF WBC: CPT | Performed by: INTERNAL MEDICINE

## 2024-03-20 PROCEDURE — 80053 COMPREHEN METABOLIC PANEL: CPT

## 2024-03-20 PROCEDURE — 36591 DRAW BLOOD OFF VENOUS DEVICE: CPT

## 2024-03-20 PROCEDURE — 25010000002 HEPARIN LOCK FLUSH PER 10 UNITS: Performed by: INTERNAL MEDICINE

## 2024-03-20 RX ORDER — DEXTROSE MONOHYDRATE 50 MG/ML
250 INJECTION, SOLUTION INTRAVENOUS ONCE
Status: DISCONTINUED | OUTPATIENT
Start: 2024-03-20 | End: 2024-03-21 | Stop reason: HOSPADM

## 2024-03-20 RX ORDER — HEPARIN SODIUM (PORCINE) LOCK FLUSH IV SOLN 100 UNIT/ML 100 UNIT/ML
500 SOLUTION INTRAVENOUS AS NEEDED
Status: DISCONTINUED | OUTPATIENT
Start: 2024-03-20 | End: 2024-03-21 | Stop reason: HOSPADM

## 2024-03-20 RX ORDER — DEXTROSE MONOHYDRATE 50 MG/ML
250 INJECTION, SOLUTION INTRAVENOUS ONCE
OUTPATIENT
Start: 2024-03-27

## 2024-03-20 RX ORDER — HEPARIN SODIUM (PORCINE) LOCK FLUSH IV SOLN 100 UNIT/ML 100 UNIT/ML
500 SOLUTION INTRAVENOUS AS NEEDED
Status: CANCELLED | OUTPATIENT
Start: 2024-03-20

## 2024-03-20 RX ORDER — SODIUM CHLORIDE 0.9 % (FLUSH) 0.9 %
20 SYRINGE (ML) INJECTION AS NEEDED
Status: DISCONTINUED | OUTPATIENT
Start: 2024-03-20 | End: 2024-03-21 | Stop reason: HOSPADM

## 2024-03-20 RX ORDER — SODIUM CHLORIDE 0.9 % (FLUSH) 0.9 %
20 SYRINGE (ML) INJECTION AS NEEDED
Status: CANCELLED | OUTPATIENT
Start: 2024-03-20

## 2024-03-20 RX ADMIN — HEPARIN 500 UNITS: 100 SYRINGE at 12:02

## 2024-03-20 RX ADMIN — Medication 20 ML: at 12:02

## 2024-03-20 NOTE — PROGRESS NOTES
Port accessed and flushed with good blood return noted. 10cc of blood wasted prior to specimen collection. Blood specimen obtained and sent to lab for processing per protocol.  Port flushed with saline and needle left in for infusion today. CMP sent to dean, patient sent to [facility] to await provider visit.

## 2024-03-20 NOTE — PROGRESS NOTES
Pt here for C12D1 Opdivo/LCV/5 FU following scheduled visit with Dr Benito.  Pt ANC 0.69, reviewed with Dr Benito and pt to have treatment delayed until next week.  Reviewed with pt and v/u and pt scheduled accordingly.  Instructed pt to notify office if develops problems/issues and to follow neutropenic precautions.  Pt discharged and AVS provided to pt.    Port accessed and flushed with good blood return noted. 10cc of blood wasted prior to specimen collection. Blood specimen obtained and sent to lab for processing per protocol.  Port flushed with saline and heparin prior to needle removal.

## 2024-03-24 ENCOUNTER — TELEPHONE (OUTPATIENT)
Dept: CARDIOLOGY | Facility: CLINIC | Age: 74
End: 2024-03-24
Payer: MEDICARE

## 2024-03-24 DIAGNOSIS — K22.89 ESOPHAGEAL MASS: Primary | ICD-10-CM

## 2024-03-24 RX ORDER — TRAMADOL HYDROCHLORIDE 50 MG/1
50 TABLET ORAL EVERY 6 HOURS PRN
Qty: 30 TABLET | Refills: 0 | Status: SHIPPED | OUTPATIENT
Start: 2024-03-24 | End: 2024-03-29 | Stop reason: SDUPTHER

## 2024-03-24 RX ORDER — PROMETHAZINE HYDROCHLORIDE 25 MG/1
25 TABLET ORAL EVERY 6 HOURS PRN
Qty: 30 TABLET | Refills: 0 | Status: SHIPPED | OUTPATIENT
Start: 2024-03-24

## 2024-03-24 NOTE — TELEPHONE ENCOUNTER
Patient called with intractable nausea, vomiting, has been taking zofran not working, also ongoing pain in the hip. Discussed hospitalization for fluids and nausea treatment. Decided to start phenergan, alternate with zofran, tramadol for pain and come to the Cancer center tomorrow.

## 2024-03-25 ENCOUNTER — HOSPITAL ENCOUNTER (OUTPATIENT)
Dept: ONCOLOGY | Facility: HOSPITAL | Age: 74
Discharge: HOME | End: 2024-03-25
Admitting: INTERNAL MEDICINE
Payer: MEDICARE

## 2024-03-25 VITALS
SYSTOLIC BLOOD PRESSURE: 96 MMHG | OXYGEN SATURATION: 99 % | HEIGHT: 73 IN | HEART RATE: 99 BPM | RESPIRATION RATE: 14 BRPM | TEMPERATURE: 97.5 F | DIASTOLIC BLOOD PRESSURE: 66 MMHG | BODY MASS INDEX: 17.42 KG/M2

## 2024-03-25 DIAGNOSIS — K22.89 ESOPHAGEAL MASS: ICD-10-CM

## 2024-03-25 DIAGNOSIS — E86.0 DEHYDRATION: Primary | ICD-10-CM

## 2024-03-25 LAB
ALP BLD-CCNC: 105 U/L (ref 53–128)
BASOPHILS # BLD AUTO: 0.04 10*3/MM3 (ref 0–0.2)
BASOPHILS NFR BLD AUTO: 1.1 % (ref 0–1.5)
BUN BLDA-MCNC: 13 MG/DL (ref 7–22)
CALCIUM BLD QL: 9.7 MG/DL (ref 8–10.3)
CHLORIDE BLDA-SCNC: 94 MMOL/L (ref 98–108)
CO2 BLDA-SCNC: 28 MMOL/L (ref 18–33)
CREAT BLDA-MCNC: 0.5 MG/DL (ref 0.6–1.2)
DEPRECATED RDW RBC AUTO: 50.6 FL (ref 37–54)
EGFRCR SERPLBLD CKD-EPI 2021: 108.4 ML/MIN/1.73
EOSINOPHIL # BLD AUTO: 0.69 10*3/MM3 (ref 0–0.4)
EOSINOPHIL NFR BLD AUTO: 19.5 % (ref 0.3–6.2)
ERYTHROCYTE [DISTWIDTH] IN BLOOD BY AUTOMATED COUNT: 15.4 % (ref 12.3–15.4)
GLUCOSE BLDC GLUCOMTR-MCNC: 166 MG/DL (ref 73–118)
HCT VFR BLD AUTO: 36.6 % (ref 37.5–51)
HGB BLD-MCNC: 11.9 G/DL (ref 13–17.7)
LYMPHOCYTES # BLD AUTO: 1.43 10*3/MM3 (ref 0.7–3.1)
LYMPHOCYTES NFR BLD AUTO: 40.5 % (ref 19.6–45.3)
MCH RBC QN AUTO: 30.2 PG (ref 26.6–33)
MCHC RBC AUTO-ENTMCNC: 32.5 G/DL (ref 31.5–35.7)
MCV RBC AUTO: 92.9 FL (ref 79–97)
MONOCYTES # BLD AUTO: 1.2 10*3/MM3 (ref 0.1–0.9)
MONOCYTES NFR BLD AUTO: 34 % (ref 5–12)
NEUTROPHILS NFR BLD AUTO: 0.17 10*3/MM3 (ref 1.7–7)
NEUTROPHILS NFR BLD AUTO: 4.9 % (ref 42.7–76)
PLATELET # BLD AUTO: 368 10*3/MM3 (ref 140–450)
PMV BLD AUTO: 9.3 FL (ref 6–12)
POC ALBUMIN: 3.2 G/L (ref 3.3–5.5)
POC ALT (SGPT): 26 U/L (ref 10–47)
POC AST (SGOT): 31 U/L (ref 11–38)
POC TOTAL BILIRUBIN: 0.5 MG/DL (ref 0.2–1.6)
POC TOTAL PROTEIN: 7.5 G/DL (ref 6.4–8.1)
POTASSIUM BLDA-SCNC: 3.2 MMOL/L (ref 3.6–5.1)
RBC # BLD AUTO: 3.94 10*6/MM3 (ref 4.14–5.8)
SODIUM BLD-SCNC: 133 MMOL/L (ref 128–145)
WBC NRBC COR # BLD AUTO: 3.53 10*3/MM3 (ref 3.4–10.8)

## 2024-03-25 PROCEDURE — 80053 COMPREHEN METABOLIC PANEL: CPT

## 2024-03-25 PROCEDURE — 25010000002 HEPARIN LOCK FLUSH PER 10 UNITS: Performed by: INTERNAL MEDICINE

## 2024-03-25 PROCEDURE — 85025 COMPLETE CBC W/AUTO DIFF WBC: CPT | Performed by: INTERNAL MEDICINE

## 2024-03-25 PROCEDURE — 25810000003 SODIUM CHLORIDE 0.9 % SOLUTION: Performed by: INTERNAL MEDICINE

## 2024-03-25 PROCEDURE — 96360 HYDRATION IV INFUSION INIT: CPT

## 2024-03-25 RX ORDER — HEPARIN SODIUM (PORCINE) LOCK FLUSH IV SOLN 100 UNIT/ML 100 UNIT/ML
500 SOLUTION INTRAVENOUS AS NEEDED
Status: DISCONTINUED | OUTPATIENT
Start: 2024-03-25 | End: 2024-03-26 | Stop reason: HOSPADM

## 2024-03-25 RX ORDER — SODIUM CHLORIDE 0.9 % (FLUSH) 0.9 %
20 SYRINGE (ML) INJECTION AS NEEDED
Status: DISCONTINUED | OUTPATIENT
Start: 2024-03-25 | End: 2024-03-26 | Stop reason: HOSPADM

## 2024-03-25 RX ORDER — SODIUM CHLORIDE 0.9 % (FLUSH) 0.9 %
20 SYRINGE (ML) INJECTION AS NEEDED
Status: CANCELLED | OUTPATIENT
Start: 2024-03-25

## 2024-03-25 RX ORDER — HEPARIN SODIUM (PORCINE) LOCK FLUSH IV SOLN 100 UNIT/ML 100 UNIT/ML
500 SOLUTION INTRAVENOUS AS NEEDED
Status: CANCELLED | OUTPATIENT
Start: 2024-03-25

## 2024-03-25 RX ADMIN — SODIUM CHLORIDE 1000 ML: 9 INJECTION, SOLUTION INTRAVENOUS at 09:36

## 2024-03-25 RX ADMIN — Medication 20 ML: at 10:46

## 2024-03-25 RX ADMIN — HEPARIN 500 UNITS: 100 SYRINGE at 10:46

## 2024-03-25 NOTE — PROGRESS NOTES
Here for labs and IVFs.  Cbc and cmp drawn per port.  IVFs given per orders.   MD notified, no new orders at this time, reviewed neutropenic precautions with patient, he v/u.  Reports pain has not gotten better since starting Tramadol, it  does  help him sleep, but pain in left hip continuous, MD notified and referral to pain management made.

## 2024-03-26 ENCOUNTER — TELEPHONE (OUTPATIENT)
Dept: ONCOLOGY | Facility: HOSPITAL | Age: 74
End: 2024-03-26
Payer: MEDICARE

## 2024-03-26 DIAGNOSIS — K22.89 ESOPHAGEAL MASS: Primary | ICD-10-CM

## 2024-03-26 NOTE — ADDENDUM NOTE
Encounter addended by: Mirna Disla RN on: 3/26/2024 10:22 AM   Actions taken: Treatment plan modified

## 2024-03-26 NOTE — TELEPHONE ENCOUNTER
Pt scheduled for C25D1 Opdivo/LCV/5 FU 3/27/24. He was held last week for ANC 0.69 and came in for IVF yesterday and WBC 3.53, ANC 0.17 Monocyte % 34 . He is scheduled for follow up MD visit on 4/3/24. Reviewed with Dr Benito and treatment to be held and Dr Benito will discuss future treatments with pt at his next visit.  Ana HERNANDEZ spoke with pt and spouse and notified and pt requesting to come to office for IVF on 3/27/24 and 3/29/24. Ana spoke with Dr Benito and pt to receive IVF as requested per plan.  VU of plan and dates.

## 2024-03-27 ENCOUNTER — HOSPITAL ENCOUNTER (OUTPATIENT)
Dept: ONCOLOGY | Facility: HOSPITAL | Age: 74
Discharge: HOME | End: 2024-03-27
Admitting: INTERNAL MEDICINE
Payer: MEDICARE

## 2024-03-27 VITALS
OXYGEN SATURATION: 95 % | RESPIRATION RATE: 14 BRPM | HEART RATE: 92 BPM | BODY MASS INDEX: 17.42 KG/M2 | DIASTOLIC BLOOD PRESSURE: 78 MMHG | SYSTOLIC BLOOD PRESSURE: 114 MMHG | HEIGHT: 73 IN | TEMPERATURE: 97.7 F

## 2024-03-27 DIAGNOSIS — K22.89 ESOPHAGEAL MASS: ICD-10-CM

## 2024-03-27 DIAGNOSIS — M71.9 BURSITIS, UNSPECIFIED SITE: ICD-10-CM

## 2024-03-27 DIAGNOSIS — C79.51 METASTATIC CANCER TO SPINE: Primary | ICD-10-CM

## 2024-03-27 DIAGNOSIS — E86.0 DEHYDRATION: ICD-10-CM

## 2024-03-27 DIAGNOSIS — C78.89 METASTATIC ADENOCARCINOMA TO ESOPHAGUS: ICD-10-CM

## 2024-03-27 DIAGNOSIS — E86.0 DEHYDRATION: Primary | ICD-10-CM

## 2024-03-27 PROCEDURE — 25010000002 HEPARIN LOCK FLUSH PER 10 UNITS: Performed by: INTERNAL MEDICINE

## 2024-03-27 PROCEDURE — 96360 HYDRATION IV INFUSION INIT: CPT

## 2024-03-27 PROCEDURE — 25810000003 SODIUM CHLORIDE 0.9 % SOLUTION: Performed by: INTERNAL MEDICINE

## 2024-03-27 RX ORDER — SODIUM CHLORIDE 0.9 % (FLUSH) 0.9 %
20 SYRINGE (ML) INJECTION AS NEEDED
Status: DISCONTINUED | OUTPATIENT
Start: 2024-03-27 | End: 2024-03-28 | Stop reason: HOSPADM

## 2024-03-27 RX ORDER — HEPARIN SODIUM (PORCINE) LOCK FLUSH IV SOLN 100 UNIT/ML 100 UNIT/ML
500 SOLUTION INTRAVENOUS AS NEEDED
Status: CANCELLED | OUTPATIENT
Start: 2024-03-27

## 2024-03-27 RX ORDER — SODIUM CHLORIDE 0.9 % (FLUSH) 0.9 %
20 SYRINGE (ML) INJECTION AS NEEDED
Status: CANCELLED | OUTPATIENT
Start: 2024-03-27

## 2024-03-27 RX ORDER — HEPARIN SODIUM (PORCINE) LOCK FLUSH IV SOLN 100 UNIT/ML 100 UNIT/ML
500 SOLUTION INTRAVENOUS AS NEEDED
Status: DISCONTINUED | OUTPATIENT
Start: 2024-03-27 | End: 2024-03-28 | Stop reason: HOSPADM

## 2024-03-27 RX ADMIN — SODIUM CHLORIDE 1000 ML: 9 INJECTION, SOLUTION INTRAVENOUS at 08:16

## 2024-03-27 RX ADMIN — HEPARIN 500 UNITS: 100 SYRINGE at 09:20

## 2024-03-27 RX ADMIN — Medication 20 ML: at 09:20

## 2024-03-27 NOTE — PROGRESS NOTES
Patient in clinic for IVF.  Port accessed by Brianna Caputo RN with sterile technique and positive blood return noted.  Blood drawn from port.  10 cc blood wasted prior to specimen collection.  Specimens sent to lab for processing. Patient states his left hip pain is severe and Tramadol 50 mg every 6 hours is not helping. He has started taking 1 tablet every 3 hours and this makes the pain manageable. Dr. Benito notified and ordered a referral to [facility] which was sent. Patient tolerated treatment. Patient de accessed and discharged in wheelchair.

## 2024-03-28 DIAGNOSIS — K22.89 ESOPHAGEAL MASS: ICD-10-CM

## 2024-03-28 RX ORDER — TRAMADOL HYDROCHLORIDE 50 MG/1
50 TABLET ORAL EVERY 6 HOURS PRN
Qty: 30 TABLET | Refills: 0 | Status: CANCELLED | OUTPATIENT
Start: 2024-03-28

## 2024-03-28 NOTE — TELEPHONE ENCOUNTER
Caller: MAMADOURAHUL PALUMBOT    Relationship: Emergency Contact    Best call back number: [phone]    Requested Prescriptions:   Requested Prescriptions     Pending Prescriptions Disp Refills    traMADol (ULTRAM) 50 MG tablet 30 tablet 0     Sig: Take 1 tablet by mouth Every 6 (Six) Hours As Needed for Moderate Pain.        [facility] where request [facility] be sent:  [facility]     Last office visit with prescribing clinician: 3/6/2024   Last telemedicine visit with prescribing clinician: Visit date not found   Next office visit with prescribing clinician: 4/3/2024     Additional details provided by patient: DR DIEZ CHANGED THE MEDICATION TO TAKING 1 PILL EVERY 3 HOURS  HE WAS TAKING 1 EVERY 6 HOURS    HE ONLY HAS 3  PILLS LEFT AND THE PHARMACY WILL NEED A NEW SCRIPT TO REFLECT IN THE CHANGE OF DIRECTIONS AND QUANTITY TOO     ALSO HE IS ASKING FOR A STRONGER DOES THE 50 MG IS NOT HELPING      Does the patient have less than a 3 day supply:  [x] Yes  [] No    Would you like a call back once the refill request has been completed: [x] Yes [] No    If the office needs to give you a call back, can they leave a voicemail: [x] Yes [] No    Charley Santiago Rep   03/28/24 12:21 EDT

## 2024-03-29 ENCOUNTER — HOSPITAL ENCOUNTER (OUTPATIENT)
Dept: ONCOLOGY | Facility: HOSPITAL | Age: 74
Discharge: HOME | End: 2024-03-29
Payer: MEDICARE

## 2024-03-29 VITALS
HEART RATE: 86 BPM | DIASTOLIC BLOOD PRESSURE: 70 MMHG | SYSTOLIC BLOOD PRESSURE: 112 MMHG | WEIGHT: 122.3 LBS | RESPIRATION RATE: 14 BRPM | OXYGEN SATURATION: 94 % | HEIGHT: 73 IN | TEMPERATURE: 97.7 F | BODY MASS INDEX: 16.21 KG/M2

## 2024-03-29 DIAGNOSIS — K22.89 ESOPHAGEAL MASS: ICD-10-CM

## 2024-03-29 DIAGNOSIS — E86.0 DEHYDRATION: Primary | ICD-10-CM

## 2024-03-29 LAB
ALBUMIN SERPL-MCNC: 4.1 G/DL (ref 3.5–5.2)
ALBUMIN/GLOB SERPL: 1.3 G/DL
ALP SERPL-CCNC: 121 U/L (ref 39–117)
ALT SERPL W P-5'-P-CCNC: 18 U/L (ref 1–41)
ANION GAP SERPL CALCULATED.3IONS-SCNC: 10 MMOL/L (ref 5–15)
AST SERPL-CCNC: 24 U/L (ref 1–40)
BILIRUB SERPL-MCNC: 0.2 MG/DL (ref 0–1.2)
BUN SERPL-MCNC: 10 MG/DL (ref 8–23)
BUN/CREAT SERPL: 17.9 (ref 7–25)
CALCIUM SPEC-SCNC: 9.4 MG/DL (ref 8.6–10.5)
CHLORIDE SERPL-SCNC: 92 MMOL/L (ref 98–107)
CO2 SERPL-SCNC: 27 MMOL/L (ref 22–29)
CREAT SERPL-MCNC: 0.56 MG/DL (ref 0.76–1.27)
EGFRCR SERPLBLD CKD-EPI 2021: 104.7 ML/MIN/1.73
GLOBULIN UR ELPH-MCNC: 3.1 GM/DL
GLUCOSE SERPL-MCNC: 90 MG/DL (ref 65–99)
POTASSIUM SERPL-SCNC: 4.1 MMOL/L (ref 3.5–5.2)
PROT SERPL-MCNC: 7.2 G/DL (ref 6–8.5)
SODIUM SERPL-SCNC: 129 MMOL/L (ref 136–145)

## 2024-03-29 PROCEDURE — 25010000002 HEPARIN LOCK FLUSH PER 10 UNITS: Performed by: INTERNAL MEDICINE

## 2024-03-29 PROCEDURE — 80053 COMPREHEN METABOLIC PANEL: CPT | Performed by: PHYSICIAN ASSISTANT

## 2024-03-29 PROCEDURE — 96360 HYDRATION IV INFUSION INIT: CPT

## 2024-03-29 PROCEDURE — 25810000003 SODIUM CHLORIDE 0.9 % SOLUTION: Performed by: INTERNAL MEDICINE

## 2024-03-29 RX ORDER — SODIUM CHLORIDE 0.9 % (FLUSH) 0.9 %
20 SYRINGE (ML) INJECTION AS NEEDED
Status: DISCONTINUED | OUTPATIENT
Start: 2024-03-29 | End: 2024-03-30 | Stop reason: HOSPADM

## 2024-03-29 RX ORDER — HEPARIN SODIUM (PORCINE) LOCK FLUSH IV SOLN 100 UNIT/ML 100 UNIT/ML
500 SOLUTION INTRAVENOUS AS NEEDED
OUTPATIENT
Start: 2024-03-29

## 2024-03-29 RX ORDER — TRAMADOL HYDROCHLORIDE 50 MG/1
50 TABLET ORAL EVERY 6 HOURS PRN
Qty: 30 TABLET | Refills: 0 | Status: SHIPPED | OUTPATIENT
Start: 2024-03-29

## 2024-03-29 RX ORDER — HEPARIN SODIUM (PORCINE) LOCK FLUSH IV SOLN 100 UNIT/ML 100 UNIT/ML
500 SOLUTION INTRAVENOUS AS NEEDED
Status: DISCONTINUED | OUTPATIENT
Start: 2024-03-29 | End: 2024-03-30 | Stop reason: HOSPADM

## 2024-03-29 RX ORDER — SODIUM CHLORIDE 0.9 % (FLUSH) 0.9 %
20 SYRINGE (ML) INJECTION AS NEEDED
OUTPATIENT
Start: 2024-03-29

## 2024-03-29 RX ADMIN — Medication 10 ML: at 15:29

## 2024-03-29 RX ADMIN — HEPARIN 500 UNITS: 100 SYRINGE at 15:29

## 2024-03-29 RX ADMIN — SODIUM CHLORIDE 1000 ML: 9 INJECTION, SOLUTION INTRAVENOUS at 14:20

## 2024-03-29 NOTE — PROGRESS NOTES
Patient in clinic for IVF's. Port accessed and flushed with good blood return noted. 10cc of blood wasted prior to specimen collection. Blood specimen obtained and sent to lab for processing per protocol.  Port flushed with saline and heparin prior to needle removal. Alda BRAGA PA-C notified of patient needing more tramadol and that a CMP was sent to the hospital to recheck his potassium (on wed it stated it was 3.2)-she stated she would send a script in. Campos LAU MA asked if she could call [facility] to makes sure they got the referral-that the patient had not heard from anyone yet. Patient tolerated fluids and was discharged with AVS. Per campos she called and they will call him on Monday. Patient called and notified of this plan and stated they had called while he was in the parking lot and he has an appointment scheduled with them.

## 2024-04-03 ENCOUNTER — HOSPITAL ENCOUNTER (INPATIENT)
Facility: HOSPITAL | Age: 74
LOS: 2 days | Discharge: HOME | End: 2024-04-05
Attending: EMERGENCY MEDICINE
Payer: MEDICARE

## 2024-04-03 ENCOUNTER — APPOINTMENT (OUTPATIENT)
Dept: CT IMAGING | Facility: HOSPITAL | Age: 74
End: 2024-04-03
Payer: MEDICARE

## 2024-04-03 ENCOUNTER — TELEPHONE (OUTPATIENT)
Dept: ONCOLOGY | Facility: CLINIC | Age: 74
End: 2024-04-03
Payer: MEDICARE

## 2024-04-03 DIAGNOSIS — G93.6 BRAIN EDEMA: ICD-10-CM

## 2024-04-03 DIAGNOSIS — C79.31 METASTATIC ADENOCARCINOMA TO BRAIN: Primary | ICD-10-CM

## 2024-04-03 LAB
ALBUMIN SERPL-MCNC: 4.3 G/DL (ref 3.5–5.2)
ALBUMIN/GLOB SERPL: 1.4 G/DL
ALP SERPL-CCNC: 108 U/L (ref 39–117)
ALT SERPL W P-5'-P-CCNC: 16 U/L (ref 1–41)
ANION GAP SERPL CALCULATED.3IONS-SCNC: 8 MMOL/L (ref 5–15)
AST SERPL-CCNC: 26 U/L (ref 1–40)
BASOPHILS # BLD AUTO: 0.04 10*3/MM3 (ref 0–0.2)
BASOPHILS NFR BLD AUTO: 0.4 % (ref 0–1.5)
BILIRUB SERPL-MCNC: 0.3 MG/DL (ref 0–1.2)
BUN SERPL-MCNC: 14 MG/DL (ref 8–23)
BUN/CREAT SERPL: 28 (ref 7–25)
CALCIUM SPEC-SCNC: 10 MG/DL (ref 8.6–10.5)
CHLORIDE SERPL-SCNC: 96 MMOL/L (ref 98–107)
CO2 SERPL-SCNC: 31 MMOL/L (ref 22–29)
CREAT SERPL-MCNC: 0.5 MG/DL (ref 0.76–1.27)
DEPRECATED RDW RBC AUTO: 48.5 FL (ref 37–54)
EGFRCR SERPLBLD CKD-EPI 2021: 108.4 ML/MIN/1.73
EOSINOPHIL # BLD AUTO: 0.06 10*3/MM3 (ref 0–0.4)
EOSINOPHIL NFR BLD AUTO: 0.7 % (ref 0.3–6.2)
ERYTHROCYTE [DISTWIDTH] IN BLOOD BY AUTOMATED COUNT: 14.6 % (ref 12.3–15.4)
GLOBULIN UR ELPH-MCNC: 3.1 GM/DL
GLUCOSE SERPL-MCNC: 124 MG/DL (ref 65–99)
HCT VFR BLD AUTO: 38 % (ref 37.5–51)
HGB BLD-MCNC: 12.3 G/DL (ref 13–17.7)
IMM GRANULOCYTES # BLD AUTO: 0.03 10*3/MM3 (ref 0–0.05)
IMM GRANULOCYTES NFR BLD AUTO: 0.3 % (ref 0–0.5)
INR PPP: 0.94 (ref 0.93–1.1)
LYMPHOCYTES # BLD AUTO: 1.19 10*3/MM3 (ref 0.7–3.1)
LYMPHOCYTES NFR BLD AUTO: 13.4 % (ref 19.6–45.3)
MCH RBC QN AUTO: 29.6 PG (ref 26.6–33)
MCHC RBC AUTO-ENTMCNC: 32.4 G/DL (ref 31.5–35.7)
MCV RBC AUTO: 91.3 FL (ref 79–97)
MONOCYTES # BLD AUTO: 0.77 10*3/MM3 (ref 0.1–0.9)
MONOCYTES NFR BLD AUTO: 8.7 % (ref 5–12)
NEUTROPHILS NFR BLD AUTO: 6.81 10*3/MM3 (ref 1.7–7)
NEUTROPHILS NFR BLD AUTO: 76.5 % (ref 42.7–76)
NRBC BLD AUTO-RTO: 0 /100 WBC (ref 0–0.2)
PLATELET # BLD AUTO: 262 10*3/MM3 (ref 140–450)
PMV BLD AUTO: 9.5 FL (ref 6–12)
POTASSIUM SERPL-SCNC: 3.9 MMOL/L (ref 3.5–5.2)
PROT SERPL-MCNC: 7.4 G/DL (ref 6–8.5)
PROTHROMBIN TIME: 10.3 SECONDS (ref 9.6–11.7)
RBC # BLD AUTO: 4.16 10*6/MM3 (ref 4.14–5.8)
SODIUM SERPL-SCNC: 135 MMOL/L (ref 136–145)
WBC NRBC COR # BLD AUTO: 8.9 10*3/MM3 (ref 3.4–10.8)

## 2024-04-03 PROCEDURE — 25810000003 SODIUM CHLORIDE 0.9 % SOLUTION: Performed by: STUDENT IN AN ORGANIZED HEALTH CARE EDUCATION/TRAINING PROGRAM

## 2024-04-03 PROCEDURE — 70450 CT HEAD/BRAIN W/O DYE: CPT

## 2024-04-03 PROCEDURE — 94799 UNLISTED PULMONARY SVC/PX: CPT

## 2024-04-03 PROCEDURE — 80053 COMPREHEN METABOLIC PANEL: CPT | Performed by: STUDENT IN AN ORGANIZED HEALTH CARE EDUCATION/TRAINING PROGRAM

## 2024-04-03 PROCEDURE — 25010000002 DEXAMETHASONE PER 1 MG: Performed by: STUDENT IN AN ORGANIZED HEALTH CARE EDUCATION/TRAINING PROGRAM

## 2024-04-03 PROCEDURE — 99285 EMERGENCY DEPT VISIT HI MDM: CPT

## 2024-04-03 PROCEDURE — 85025 COMPLETE CBC W/AUTO DIFF WBC: CPT | Performed by: STUDENT IN AN ORGANIZED HEALTH CARE EDUCATION/TRAINING PROGRAM

## 2024-04-03 PROCEDURE — 72131 CT LUMBAR SPINE W/O DYE: CPT

## 2024-04-03 PROCEDURE — 85610 PROTHROMBIN TIME: CPT | Performed by: STUDENT IN AN ORGANIZED HEALTH CARE EDUCATION/TRAINING PROGRAM

## 2024-04-03 PROCEDURE — 36415 COLL VENOUS BLD VENIPUNCTURE: CPT

## 2024-04-03 PROCEDURE — 25010000002 ONDANSETRON PER 1 MG: Performed by: STUDENT IN AN ORGANIZED HEALTH CARE EDUCATION/TRAINING PROGRAM

## 2024-04-03 PROCEDURE — 94640 AIRWAY INHALATION TREATMENT: CPT

## 2024-04-03 RX ORDER — TRAMADOL HYDROCHLORIDE 50 MG/1
50 TABLET ORAL EVERY 6 HOURS PRN
Status: DISCONTINUED | OUTPATIENT
Start: 2024-04-03 | End: 2024-04-05 | Stop reason: HOSPADM

## 2024-04-03 RX ORDER — DEXAMETHASONE SODIUM PHOSPHATE 4 MG/ML
6 INJECTION, SOLUTION INTRA-ARTICULAR; INTRALESIONAL; INTRAMUSCULAR; INTRAVENOUS; SOFT TISSUE EVERY 6 HOURS
Status: DISCONTINUED | OUTPATIENT
Start: 2024-04-03 | End: 2024-04-05 | Stop reason: HOSPADM

## 2024-04-03 RX ORDER — ACETAMINOPHEN 325 MG/1
650 TABLET ORAL EVERY 4 HOURS PRN
Status: DISCONTINUED | OUTPATIENT
Start: 2024-04-03 | End: 2024-04-05 | Stop reason: HOSPADM

## 2024-04-03 RX ORDER — ALBUTEROL SULFATE 90 UG/1
2 AEROSOL, METERED RESPIRATORY (INHALATION) EVERY 4 HOURS PRN
Status: DISCONTINUED | OUTPATIENT
Start: 2024-04-03 | End: 2024-04-05 | Stop reason: HOSPADM

## 2024-04-03 RX ORDER — MAGNESIUM CARB/ALUMINUM HYDROX 105-160MG
30 TABLET,CHEWABLE ORAL NIGHTLY
COMMUNITY

## 2024-04-03 RX ORDER — SODIUM CHLORIDE 9 MG/ML
75 INJECTION, SOLUTION INTRAVENOUS CONTINUOUS
Status: DISCONTINUED | OUTPATIENT
Start: 2024-04-03 | End: 2024-04-05 | Stop reason: HOSPADM

## 2024-04-03 RX ORDER — BISACODYL 10 MG
10 SUPPOSITORY, RECTAL RECTAL DAILY PRN
Status: DISCONTINUED | OUTPATIENT
Start: 2024-04-03 | End: 2024-04-05 | Stop reason: HOSPADM

## 2024-04-03 RX ORDER — ONDANSETRON 4 MG/1
4 TABLET, ORALLY DISINTEGRATING ORAL EVERY 6 HOURS PRN
Status: DISCONTINUED | OUTPATIENT
Start: 2024-04-03 | End: 2024-04-05 | Stop reason: HOSPADM

## 2024-04-03 RX ORDER — AMOXICILLIN 250 MG
2 CAPSULE ORAL 2 TIMES DAILY PRN
Status: DISCONTINUED | OUTPATIENT
Start: 2024-04-03 | End: 2024-04-05 | Stop reason: HOSPADM

## 2024-04-03 RX ORDER — SODIUM CHLORIDE 0.9 % (FLUSH) 0.9 %
10 SYRINGE (ML) INJECTION EVERY 12 HOURS SCHEDULED
Status: DISCONTINUED | OUTPATIENT
Start: 2024-04-03 | End: 2024-04-05 | Stop reason: HOSPADM

## 2024-04-03 RX ORDER — OXYCODONE HYDROCHLORIDE 5 MG/1
5 TABLET ORAL EVERY 4 HOURS PRN
Status: DISCONTINUED | OUTPATIENT
Start: 2024-04-03 | End: 2024-04-05 | Stop reason: HOSPADM

## 2024-04-03 RX ORDER — POLYETHYLENE GLYCOL 3350 17 G/17G
17 POWDER, FOR SOLUTION ORAL DAILY PRN
Status: DISCONTINUED | OUTPATIENT
Start: 2024-04-03 | End: 2024-04-05 | Stop reason: HOSPADM

## 2024-04-03 RX ORDER — DOCUSATE SODIUM 100 MG/1
100 CAPSULE, LIQUID FILLED ORAL DAILY PRN
Status: DISCONTINUED | OUTPATIENT
Start: 2024-04-03 | End: 2024-04-05 | Stop reason: HOSPADM

## 2024-04-03 RX ORDER — BUDESONIDE AND FORMOTEROL FUMARATE DIHYDRATE 160; 4.5 UG/1; UG/1
2 AEROSOL RESPIRATORY (INHALATION)
Status: DISCONTINUED | OUTPATIENT
Start: 2024-04-03 | End: 2024-04-05 | Stop reason: HOSPADM

## 2024-04-03 RX ORDER — BISACODYL 5 MG/1
5 TABLET, DELAYED RELEASE ORAL DAILY PRN
Status: DISCONTINUED | OUTPATIENT
Start: 2024-04-03 | End: 2024-04-05 | Stop reason: HOSPADM

## 2024-04-03 RX ORDER — ONDANSETRON 2 MG/ML
4 INJECTION INTRAMUSCULAR; INTRAVENOUS EVERY 6 HOURS PRN
Status: DISCONTINUED | OUTPATIENT
Start: 2024-04-03 | End: 2024-04-05 | Stop reason: HOSPADM

## 2024-04-03 RX ORDER — DEXAMETHASONE SODIUM PHOSPHATE 4 MG/ML
10 INJECTION, SOLUTION INTRA-ARTICULAR; INTRALESIONAL; INTRAMUSCULAR; INTRAVENOUS; SOFT TISSUE ONCE
Status: DISCONTINUED | OUTPATIENT
Start: 2024-04-03 | End: 2024-04-03

## 2024-04-03 RX ORDER — SODIUM CHLORIDE 9 MG/ML
40 INJECTION, SOLUTION INTRAVENOUS AS NEEDED
Status: DISCONTINUED | OUTPATIENT
Start: 2024-04-03 | End: 2024-04-05 | Stop reason: HOSPADM

## 2024-04-03 RX ORDER — SODIUM CHLORIDE 0.9 % (FLUSH) 0.9 %
10 SYRINGE (ML) INJECTION AS NEEDED
Status: DISCONTINUED | OUTPATIENT
Start: 2024-04-03 | End: 2024-04-05 | Stop reason: HOSPADM

## 2024-04-03 RX ADMIN — DEXAMETHASONE SODIUM PHOSPHATE 6 MG: 4 INJECTION, SOLUTION INTRA-ARTICULAR; INTRALESIONAL; INTRAMUSCULAR; INTRAVENOUS; SOFT TISSUE at 16:41

## 2024-04-03 RX ADMIN — Medication 10 ML: at 20:47

## 2024-04-03 RX ADMIN — BUDESONIDE AND FORMOTEROL FUMARATE DIHYDRATE 2 PUFF: 160; 4.5 AEROSOL RESPIRATORY (INHALATION) at 19:11

## 2024-04-03 RX ADMIN — SODIUM CHLORIDE 75 ML/HR: 9 INJECTION, SOLUTION INTRAVENOUS at 22:31

## 2024-04-03 RX ADMIN — OXYCODONE 5 MG: 5 TABLET ORAL at 18:58

## 2024-04-03 RX ADMIN — ONDANSETRON 4 MG: 2 INJECTION INTRAMUSCULAR; INTRAVENOUS at 16:46

## 2024-04-03 RX ADMIN — DEXAMETHASONE SODIUM PHOSPHATE 6 MG: 4 INJECTION, SOLUTION INTRA-ARTICULAR; INTRALESIONAL; INTRAMUSCULAR; INTRAVENOUS; SOFT TISSUE at 21:14

## 2024-04-03 NOTE — PLAN OF CARE
Goal Outcome Evaluation:Pt will have hospitalization free of falls.

## 2024-04-03 NOTE — Clinical Note
Level of Care: Med/Surg [1]   Admitting Physician: SHANKAR ABERNATHY [[id number]]   Attending Physician: SHANKAR ABERNATHY [[id number]]

## 2024-04-03 NOTE — ED PROVIDER NOTES
Subjective   History of Present Illness  Patient is a 73-year-old male complaining of weakness and numbness in his left leg for the past 3 weeks.  COMPLAINS of headache.  He denies cough fever Paul diarrhea or other complaint.  Patient is known to have metastatic esophageal cancer currently being treated.      Review of Systems    Past Medical History:   Diagnosis Date    Anemia 08/04/2023    Anxiety 06/07/2022    Bilateral cataracts 06/23/2023    Colon polyp     Constipation 05/03/2022    BM daily w/ mineral oil.    COPD (chronic obstructive pulmonary disease)     COVID-19 08/16/2022    Dysphagia     Fatigue 05/03/2022    GERD (gastroesophageal reflux disease)     History of hiatal hernia     Impacted cerumen of right ear 05/03/2022    Prediabetes 05/10/2022       No Known Allergies    History reviewed. No pertinent surgical history.    Family History   Problem Relation Age of Onset    Diabetes Mother     Diabetes Father     Cancer Sister         Unknown of type       Social History     Socioeconomic History    Marital status:    Tobacco Use    Smoking status: Every Day     Current packs/day: 1.00     Types: Cigarettes   Vaping Use    Vaping status: Never Used   Substance and Sexual Activity    Alcohol use: Never    Drug use: Never    Sexual activity: Defer           Objective   Physical Exam  Neck has no adenopathy JVD or bruits.  Lungs are clear.  Heart has a regular rate rhythm without murmur rub or gallop.  Chest is nontender.  Abdomen is soft nontender.  Patient normal bowel sounds.  Neurologic exam shows patient have weakness in his left leg.  He has 2+ pulses throughout.  Procedures           ED Course      Results for orders placed or performed during the hospital encounter of 03/29/24   Comprehensive Metabolic Panel    Specimen: Blood   Result Value Ref Range    Glucose 90 65 - 99 mg/dL    BUN 10 8 - 23 mg/dL    Creatinine 0.56 (L) 0.76 - 1.27 mg/dL    Sodium 129 (L) 136 - 145 mmol/L    Potassium  4.1 3.5 - 5.2 mmol/L    Chloride 92 (L) 98 - 107 mmol/L    CO2 27.0 22.0 - 29.0 mmol/L    Calcium 9.4 8.6 - 10.5 mg/dL    Total Protein 7.2 6.0 - 8.5 g/dL    Albumin 4.1 3.5 - 5.2 g/dL    ALT (SGPT) 18 1 - 41 U/L    AST (SGOT) 24 1 - 40 U/L    Alkaline Phosphatase 121 (H) 39 - 117 U/L    Total Bilirubin 0.2 0.0 - 1.2 mg/dL    Globulin 3.1 gm/dL    A/G Ratio 1.3 g/dL    BUN/Creatinine Ratio 17.9 7.0 - 25.0    Anion Gap 10.0 5.0 - 15.0 mmol/L    eGFR 104.7 >60.0 mL/min/1.73     CT Lumbar Spine Without Contrast    Result Date: 4/3/2024  Impression: 1. New 7 mm pulmonary nodule at the left lung base with spiculated borders concerning for metastatic disease. 2. Multilevel degenerative changes in the lumbar spine. At level L4/5 there is moderate to severe narrowing of the right neural foramen with suspected contact with the exiting right L4 nerve root. 3. Urinary bladder distention extending into the lower abdomen. 4. Infrarenal abdominal aortic aneurysm measuring 3.3 cm. 5. Heterogeneous osseous lesion in the L4 vertebral body stable in appearance without pathologic fracture. Electronically Signed: Jade Slaughter MD  4/3/2024 2:14 PM EDT  Workstation ID: [id number]    CT Head Without Contrast    Result Date: 4/3/2024  Impression: Right posterior frontal mass with vasogenic edema, suggestive of  intracranial metastasis in a patient with known primary malignancy. MRI of the brain with and without contrast is recommended for further assessment These findings were discussed with Dr. Joyce in the emergency department on 4/3/2024 at 2:05 p.m. eastern standard time. Electronically Signed: Charlene Mcconnell MD  4/3/2024 2:06 PM EDT  Workstation ID: [id number]                                          Medical Decision Making  My interpretation by CT scan head without contrast shows a right frontal mass with surrounding edema.  This was corroborated by the radiologist.  Patient CT scan lumbar spine per the radiologist shows pulmonary  nodule as well as lumbar degenerative change.  BMP shows no renal insufficiency with mild hyponatremia.  CBC is pending at this time.  Patient will be admitted for further oncologic evaluation.  Patient was given Decadron IV.  I did speak to the on-call hospitalist.    Amount and/or Complexity of Data Reviewed  Labs: ordered. Decision-making details documented in ED Course.  Radiology: ordered and independent interpretation performed.    Risk  Decision regarding hospitalization.        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       Medication List      No changes were made to your prescriptions during this visit.            Pavel Joyce MD  04/03/24 [id number]

## 2024-04-03 NOTE — H&P
[facility] Medicine Services  History & Physical    Patient Name: Aravind Lim  : 1950  MRN: [id number]  Primary Care Physician:  Abiodun Steele MD  Date of admission: 4/3/2024  Date and Time of Service: 4/3/2024 at 2:35 pm     Subjective      Chief Complaint: Loss of HiBi and lower extremity of 1 day    History of Present Illness: Aravind Lim is a 73 y.o. male with a CMH of  adeno carcinoma of esophagus.,,  GERD, hiatal hernia, COPD, colonic polyp, diverticulosis, history of smoking presented with weakness of his legs 1 day duration  Patient was supposed to have his scheduled infusion and missed the appointment.  Staff called the patient and was informed by the spouse that he is in severe pain and cannot feel one of his legs.  Hence patient was asked to come to the ER  In the ER patient was [address]33, temperature of 97.8 and pulse rate of 90 CT of the head without contrast showed right posterior frontal mass with vasogenic edema suggestive of intracranial metastasis and the patient known for primary malignancy.  Recommended MRI    .Review of Systems  14 point review of system unremarkable except mentioned above  Personal History     Past Medical History:   Diagnosis Date    Anemia 2023    Anxiety 2022    Bilateral cataracts 2023    Colon polyp     Constipation 2022    BM daily w/ mineral oil.    COPD (chronic obstructive pulmonary disease)     COVID-19 2022    Dysphagia     Fatigue 2022    GERD (gastroesophageal reflux disease)     History of hiatal hernia     Impacted cerumen of right ear 2022    Prediabetes 05/10/2022       No past surgical history on file.    Family History: family history includes Cancer in his sister; Diabetes in his father and mother. Otherwise pertinent FHx was reviewed and not pertinent to current issue.    Social History:  reports that he has been smoking cigarettes. He does not have any smokeless tobacco history on file. He  reports that he does not drink alcohol and does not use drugs.    Home Medications:  Prior to Admission Medications       Prescriptions Last Dose Informant Patient Reported? Taking?    traMADol (ULTRAM) 50 MG tablet   No No    Take 1 tablet by mouth Every 6 (Six) Hours As Needed for Moderate Pain.    albuterol sulfate  (90 Base) MCG/ACT inhaler   Yes No    Inhale 2 puffs Every 4 (Four) Hours As Needed.    budesonide-formoterol (SYMBICORT) 160-4.5 MCG/ACT inhaler   Yes No    Inhale 2 puffs 2 (Two) Times a Day.    Cholecalciferol 50 MCG (2000 UT) tablet   Yes No    Take 1 tablet by mouth Daily.    Patient not taking:  Reported on 10/2/2023    docusate sodium (Colace) 100 MG capsule   No No    Take 1 capsule by mouth every night at bedtime.    ondansetron (ZOFRAN) 8 MG tablet   No No    Take 1 tablet by mouth 3 (Three) Times a Day As Needed for Nausea or Vomiting.    Patient not taking:  Reported on 3/20/2024    promethazine (PHENERGAN) 25 MG tablet   No No    Take 1 tablet by mouth Every 6 (Six) Hours As Needed for Nausea or Vomiting.    triamcinolone (KENALOG) 0.1 % ointment   No No    Apply 1 application  topically to the appropriate area as directed 2 (Two) Times a Day.    Patient not taking:  Reported on 3/20/2024    vitamin B-12 (CYANOCOBALAMIN) 1000 MCG tablet   Yes No    Take 1 tablet by mouth Daily.    Patient not taking:  Reported on 3/4/2024              Allergies:  No Known Allergies    Objective      Vitals:   Temp:  [97.8 °F (36.6 °C)] 97.8 °F (36.6 °C)  Heart Rate:  [90] 90  Resp:  [14] 14  BP: (176)/(87) 176/87  Body mass index is 15 kg/m².  Physical Exam  HENT:      Head: Atraumatic.      Mouth/Throat:      Mouth: Mucous membranes are moist.   Eyes:      Pupils: Pupils are equal, round, and reactive to light.   Cardiovascular:      Rate and Rhythm: Normal rate and regular rhythm.   Pulmonary:      Effort: Pulmonary effort is normal.   Abdominal:      General: Bowel sounds are normal.       Palpations: Abdomen is soft.   Musculoskeletal:         General: Normal range of motion.      Cervical back: Neck supple.   Skin:     General: Skin is warm.   Neurological:      Mental Status: He is alert.      Comments: LLE power 3+/5,    Psychiatric:         Mood and Affect: Mood normal.         Diagnostic Data:  Lab Results (last 24 hours)       ** No results found for the last 24 hours. **             Imaging Results (Last 24 Hours)       Procedure Component Value Units Date/Time    CT Head Without Contrast [[id number]] Collected: 04/03/24 1356     Updated: 04/03/24 1408    Narrative:      CT HEAD WO CONTRAST    Date of Exam: 4/3/2024 1:43 PM EDT    Indication: Headache, new or worsening (Age >= 50y).    Comparison: None available.    Technique: Axial CT images were obtained of the head without contrast administration.  Coronal reconstructions were performed.  Automated exposure control and iterative reconstruction methods were used.      Findings:    There is an approximately 19 x 16 x 11 mm sized mass in the superior posterior right frontal lobe with surrounding hypodensity consistent with vasogenic edema. There is no significant midline shift.    There is no evidence of acute territorial infarction.    There is no acute intracranial hemorrhage. There are no extra-axial collections.    Ventricles and CSF spaces are symmetrically prominent. No mass effect nor hydrocephalus.     Mild foamy secretions are noted in the left sphenoid sinus. There is trace mucosal thickening of the bilateral ethmoid air cells. The mastoid air cells are clear.     Osseous structures and orbits appear intact.      Impression:      Impression:  Right posterior frontal mass with vasogenic edema, suggestive of  intracranial metastasis in a patient with known primary malignancy. MRI of the brain with and without contrast is recommended for further assessment    These findings were discussed with Dr. Joyce in the emergency department on  4/3/2024 at 2:05 p.m. eastern standard time.      Electronically Signed: Charlene Mcconnell MD    4/3/2024 2:06 PM EDT    Workstation ID: [id number]    CT Lumbar Spine Without Contrast [[id number]] Resulted: 04/03/24 1404     Updated: 04/03/24 1351              Assessment & Plan        This is a 73 y.o. 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        The patient desires to be as follows:    CODE STATUS:     Full code      RAHUL CEDILLO (Spouse)  [phone] (Mobile) is the designated person to make medical decisions on the patient's behalf if He is incapable of doing so. This was clarified with patient and/or next of kin on 4/3/2024 during the course of this H&P.    Admission Status:  I believe this patient meets inpaatinet  status.    Expected Length of Stay: >2 midnights     PDMP and Medication Dispenses via Sidebar reviewed and consistent with patient reported medications.    I discussed the patient's findings and my recommendations with patient.      Signature:     This document has been electronically signed by Boo Christie MD on April 3, 2024 14:41 EDT   [facility]

## 2024-04-03 NOTE — TELEPHONE ENCOUNTER
CALLED TO MAKE SURE HE WAS OK DUE TO HIM NOT COMING IN TODAY FOR INFUSION AND FOLLOW UP, SPOUSE TOLD ME THAT HE IS IN SEVERE PAIN AND CAN'T FEEL ONE OF HIS LEGS AND HIS HIP AND HE IS WALKING WITH A WALKER.  I ADVISED HER TO TAKE HIM TO [facility] AND SHE STATED THAT SHE WOULD.

## 2024-04-04 ENCOUNTER — APPOINTMENT (OUTPATIENT)
Dept: MRI IMAGING | Facility: HOSPITAL | Age: 74
End: 2024-04-04
Payer: MEDICARE

## 2024-04-04 PROCEDURE — 25010000002 DEXAMETHASONE PER 1 MG: Performed by: STUDENT IN AN ORGANIZED HEALTH CARE EDUCATION/TRAINING PROGRAM

## 2024-04-04 PROCEDURE — 25010000002 ONDANSETRON PER 1 MG: Performed by: STUDENT IN AN ORGANIZED HEALTH CARE EDUCATION/TRAINING PROGRAM

## 2024-04-04 PROCEDURE — 25010000002 GADOTERIDOL PER 1 ML: Performed by: STUDENT IN AN ORGANIZED HEALTH CARE EDUCATION/TRAINING PROGRAM

## 2024-04-04 PROCEDURE — 99222 1ST HOSP IP/OBS MODERATE 55: CPT | Performed by: NURSE PRACTITIONER

## 2024-04-04 PROCEDURE — 25810000003 SODIUM CHLORIDE 0.9 % SOLUTION: Performed by: STUDENT IN AN ORGANIZED HEALTH CARE EDUCATION/TRAINING PROGRAM

## 2024-04-04 PROCEDURE — 99223 1ST HOSP IP/OBS HIGH 75: CPT | Performed by: INTERNAL MEDICINE

## 2024-04-04 PROCEDURE — 94799 UNLISTED PULMONARY SVC/PX: CPT

## 2024-04-04 PROCEDURE — 72158 MRI LUMBAR SPINE W/O & W/DYE: CPT

## 2024-04-04 PROCEDURE — 97162 PT EVAL MOD COMPLEX 30 MIN: CPT | Performed by: PHYSICAL THERAPIST

## 2024-04-04 PROCEDURE — 70553 MRI BRAIN STEM W/O & W/DYE: CPT

## 2024-04-04 PROCEDURE — 94761 N-INVAS EAR/PLS OXIMETRY MLT: CPT

## 2024-04-04 PROCEDURE — 94664 DEMO&/EVAL PT USE INHALER: CPT

## 2024-04-04 PROCEDURE — 72156 MRI NECK SPINE W/O & W/DYE: CPT

## 2024-04-04 PROCEDURE — A9579 GAD-BASE MR CONTRAST NOS,1ML: HCPCS | Performed by: STUDENT IN AN ORGANIZED HEALTH CARE EDUCATION/TRAINING PROGRAM

## 2024-04-04 PROCEDURE — 72157 MRI CHEST SPINE W/O & W/DYE: CPT

## 2024-04-04 RX ADMIN — SODIUM CHLORIDE 75 ML/HR: 9 INJECTION, SOLUTION INTRAVENOUS at 12:58

## 2024-04-04 RX ADMIN — BUDESONIDE AND FORMOTEROL FUMARATE DIHYDRATE 2 PUFF: 160; 4.5 AEROSOL RESPIRATORY (INHALATION) at 06:58

## 2024-04-04 RX ADMIN — OXYCODONE 5 MG: 5 TABLET ORAL at 09:33

## 2024-04-04 RX ADMIN — DEXAMETHASONE SODIUM PHOSPHATE 6 MG: 4 INJECTION, SOLUTION INTRA-ARTICULAR; INTRALESIONAL; INTRAMUSCULAR; INTRAVENOUS; SOFT TISSUE at 04:45

## 2024-04-04 RX ADMIN — DOCUSATE SODIUM 100 MG: 100 CAPSULE, LIQUID FILLED ORAL at 09:45

## 2024-04-04 RX ADMIN — BUDESONIDE AND FORMOTEROL FUMARATE DIHYDRATE 2 PUFF: 160; 4.5 AEROSOL RESPIRATORY (INHALATION) at 20:10

## 2024-04-04 RX ADMIN — DEXAMETHASONE SODIUM PHOSPHATE 6 MG: 4 INJECTION, SOLUTION INTRA-ARTICULAR; INTRALESIONAL; INTRAMUSCULAR; INTRAVENOUS; SOFT TISSUE at 09:33

## 2024-04-04 RX ADMIN — DEXAMETHASONE SODIUM PHOSPHATE 6 MG: 4 INJECTION, SOLUTION INTRA-ARTICULAR; INTRALESIONAL; INTRAMUSCULAR; INTRAVENOUS; SOFT TISSUE at 14:53

## 2024-04-04 RX ADMIN — DEXAMETHASONE SODIUM PHOSPHATE 6 MG: 4 INJECTION, SOLUTION INTRA-ARTICULAR; INTRALESIONAL; INTRAMUSCULAR; INTRAVENOUS; SOFT TISSUE at 20:42

## 2024-04-04 RX ADMIN — Medication 10 ML: at 09:45

## 2024-04-04 RX ADMIN — GADOTERIDOL 11 ML: 279.3 INJECTION, SOLUTION INTRAVENOUS at 02:42

## 2024-04-04 RX ADMIN — Medication 10 ML: at 20:43

## 2024-04-04 RX ADMIN — OXYCODONE 5 MG: 5 TABLET ORAL at 14:12

## 2024-04-04 RX ADMIN — ONDANSETRON 4 MG: 2 INJECTION INTRAMUSCULAR; INTRAVENOUS at 16:37

## 2024-04-04 NOTE — DISCHARGE PLACEMENT REQUEST
"RaphaelAravind (73 y.o. Male)       Date of Birth   1950    Social Security Number       Address   [address] [place] [address] [address] [place] [address]    Home Phone       MRN   [id number]       Roman Catholic   Non-Episcopalian    Marital Status                               Admission Date   4/3/24    Admission Type   Emergency    Admitting Provider   Boo Christie MD    Attending Provider   Gerry Aranda MD    Department, Room/Bed   [facility] 3A MEDICAL INPATIENT, 303/1       Discharge Date       Discharge Disposition       Discharge Destination                                 Attending Provider: Gerry Aranda MD    Allergies: No Known Allergies    Isolation: None   Infection: None   Code Status: CPR    Ht: 190.5 cm (75\")   Wt: 54.7 kg (120 lb 9.5 oz)    Admission Cmt: None   Principal Problem: Brain edema [G93.6]                   Active Insurance as of 4/3/2024       Primary Coverage       Payor Plan Insurance Group Employer/Plan Group    HUMANA MEDICARE REPLACEMENT HUMANA MED ADV HMO [id number]       Payor Plan Address Payor Plan Phone Number Payor Plan Fax Number Effective Dates    [address] [address] [phone]  1/1/2023 - None Entered    [facility] [id number]         Subscriber Name Subscriber Birth Date Member ID       ARAVIND CEDILLO 1950 [id number]                     Emergency Contacts        (Rel.) Home Phone Work Phone Mobile Phone    RAHUL CEDILLO JAVIER (Spouse) [phone] -- [phone]                "

## 2024-04-04 NOTE — CASE MANAGEMENT/SOCIAL WORK
Discharge Planning Assessment   Antoine     Patient Name: Aravind Lim  MRN: [id number]  Today's Date: 4/4/2024    Admit Date: 4/3/2024    Plan: Pending PT/OT recomendations. From home with spouse.   Discharge Needs Assessment       Row Name 04/04/24 0958       Living Environment    People in Home spouse    Name(s) of People in Home Wife- Yessi    Current Living Arrangements home    Potentially Unsafe Housing Conditions none    In the past 12 months has the electric, gas, oil, or water company threatened to shut off services in your home? No    Primary Care Provided by self    Provides Primary Care For no one    Family Caregiver if Needed spouse    Family Caregiver Names Wife- Yessi    Quality of Family Relationships helpful;involved;supportive    Able to Return to Prior Arrangements yes       Resource/Environmental Concerns    Resource/Environmental Concerns none    Transportation Concerns none       Transportation Needs    In the past 12 months, has lack of transportation kept you from medical appointments or from getting medications? no    In the past 12 months, has lack of transportation kept you from meetings, work, or from getting things needed for daily living? No       Food Insecurity    Within the past 12 months, you worried that your food would run out before you got the money to buy more. Never true    Within the past 12 months, the food you bought just didn't last and you didn't have money to get more. Never true       Transition Planning    Patient/Family Anticipates Transition to home with family    Patient/Family Anticipated Services at Transition none    Transportation Anticipated family or friend will provide       Discharge Needs Assessment    Readmission Within the Last 30 Days no previous admission in last 30 days    Equipment Currently Used at Home walker, standard    Concerns to be Addressed discharge planning    Anticipated Changes Related to Illness none    Equipment Needed After Discharge  none                   Discharge Plan       Row Name 04/04/24 0959       Plan    Plan Pending PT/OT recomendations. From home with spouse.    Patient/Family in Agreement with Plan yes    Plan Comments Patient lives at home with his spouse,Yessi. Patient does not drive and his wife, Yessi, will transport at discharge. Patient has his wife help him with IADL. PCP and pharmacy confirmed, patient wishes to be enrolled in the meds to bed program at this time, CM will update this in the chart. Denies financial assistance needs for medications and/or food at this time. Patient is interested in HH at this time and would really like help bathing. CM gave patient and his wife the HH options sheet and they are going to review and give CM options on who they would like to go with at discharge. Discharge barriers: Radiation/oncology following, hematology & oncology following, neurosurgery following, cont fluids, IV push steriods, abnormal labs, cardiac monitoring.               Expected Discharge Date and Time       Expected Discharge Date Expected Discharge Time    Apr 6, 2024            Demographic Summary       Row Name 04/04/24 0957       General Information    Admission Type inpatient    Arrived From emergency department    Required Notices Provided Important Message from Medicare    Referral Source admission list    Reason for Consult discharge planning    Preferred Language English       Contact Information    Permission Granted to Share Info With                    Functional Status       Row Name 04/04/24 0957       Functional Status    Usual Activity Tolerance moderate    Current Activity Tolerance moderate       Functional Status, IADL    Medications assistive person    Meal Preparation assistive person    Housekeeping assistive person    Laundry assistive person    Shopping assistive person               Met with patient in room wearing PPE: mask.    Maintained distance greater than six feet and spent less  than 15 minutes in the room.     Gwen Alfredo RN

## 2024-04-04 NOTE — PROGRESS NOTES
[facility] Medicine Services       Patient Name: Aravidn Lim  : 1950  MRN: [id number]  Primary Care Physician:  Abiodun Steele MD  Date of admission: 4/3/2024  Date and Time of Service: 4/3/2024 at 2:35 pm      Subjective       Chief Complaint: Loss of HiBi and lower extremity of 1 day     History of Present Illness: Aravind Lim is a 73 y.o. male with a CMH of  adeno carcinoma of esophagus.,,  GERD, hiatal hernia, COPD, colonic polyp, diverticulosis, history of smoking presented with weakness of his legs 1 day duration  Patient was supposed to have his scheduled infusion and missed the appointment.  Staff called the patient and was informed by the spouse that he is in severe pain and cannot feel one of his legs.  Hence patient was asked to come to the ER  In the ER patient was [address]046, temperature of 97.8 and pulse rate of 90 CT of the head without contrast showed right posterior frontal mass with vasogenic edema suggestive of intracranial metastasis and the patient known for primary malignancy.  Recommended MRI     .Review of Systems  14 point review of system unremarkable except mentioned above    4/5  Patient admitted yesterday  Patient with esophageal cancer with brain mets  Awake alert oriented x 3  Patient is weak having difficulty with ambulating  Patient is going to need home health care on discharge  Patient is going to be scheduled for radiation oncology evaluation for the brain mets as per oncology  Hemodynamically stable afebrile  Pt is constipated he use mineral oilat home,ok to resume  Personal History      Medical History        Past Medical History:   Diagnosis Date    Anemia 2023    Anxiety 2022    Bilateral cataracts 2023    Colon polyp      Constipation 2022     BM daily w/ mineral oil.    COPD (chronic obstructive pulmonary disease)      COVID-19 2022    Dysphagia      Fatigue 2022    GERD (gastroesophageal reflux disease)      History of  hiatal hernia      Impacted cerumen of right ear 05/03/2022    Prediabetes 05/10/2022            Surgical History   No past surgical history on file.        Family History: family history includes Cancer in his sister; Diabetes in his father and mother. Otherwise pertinent FHx was reviewed and not pertinent to current issue.     Social History:  reports that he has been smoking cigarettes. He does not have any smokeless tobacco history on file. He reports that he does not drink alcohol and does not use drugs.     Home Medications:  Prior to Admission Medications         Prescriptions Last Dose Informant Patient Reported? Taking?     traMADol (ULTRAM) 50 MG tablet     No No     Take 1 tablet by mouth Every 6 (Six) Hours As Needed for Moderate Pain.     albuterol sulfate  (90 Base) MCG/ACT inhaler     Yes No     Inhale 2 puffs Every 4 (Four) Hours As Needed.     budesonide-formoterol (SYMBICORT) 160-4.5 MCG/ACT inhaler     Yes No     Inhale 2 puffs 2 (Two) Times a Day.     Cholecalciferol 50 MCG (2000 UT) tablet     Yes No     Take 1 tablet by mouth Daily.     Patient not taking:  Reported on 10/2/2023     docusate sodium (Colace) 100 MG capsule     No No     Take 1 capsule by mouth every night at bedtime.     ondansetron (ZOFRAN) 8 MG tablet     No No     Take 1 tablet by mouth 3 (Three) Times a Day As Needed for Nausea or Vomiting.     Patient not taking:  Reported on 3/20/2024     promethazine (PHENERGAN) 25 MG tablet     No No     Take 1 tablet by mouth Every 6 (Six) Hours As Needed for Nausea or Vomiting.     triamcinolone (KENALOG) 0.1 % ointment     No No     Apply 1 application  topically to the appropriate area as directed 2 (Two) Times a Day.     Patient not taking:  Reported on 3/20/2024     vitamin B-12 (CYANOCOBALAMIN) 1000 MCG tablet     Yes No     Take 1 tablet by mouth Daily.     Patient not taking:  Reported on 3/4/2024                   Allergies:  Allergies   No Known Allergies         Objective       Vitals:   Temp:  [97.8 °F (36.6 °C)] 97.8 °F (36.6 °C)  Heart Rate:  [90] 90  Resp:  [14] 14  BP: (176)/(87) 176/87  Body mass index is 15 kg/m².  Physical Exam  HENT:      Head: Atraumatic.      Mouth/Throat:      Mouth: Mucous membranes are moist.   Eyes:      Pupils: Pupils are equal, round, and reactive to light.   Cardiovascular:      Rate and Rhythm: Normal rate and regular rhythm.   Pulmonary:      Effort: Pulmonary effort is normal.   Abdominal:      General: Bowel sounds are normal.      Palpations: Abdomen is soft.   Musculoskeletal:         General: Normal range of motion.      Cervical back: Neck supple.   Skin:     General: Skin is warm.   Neurological:      Mental Status: He is alert.      Comments: LLE power 3+/5,    Psychiatric:         Mood and Affect: Mood normal.            Diagnostic Data:  Lab Results (last 24 hours)         ** No results found for the last 24 hours. **                           Imaging Results (Last 24 Hours)         Procedure Component Value Units Date/Time     CT Head Without Contrast [[id number]] Collected: 04/03/24 1356       Updated: 04/03/24 1408     Narrative:       CT HEAD WO CONTRAST     Date of Exam: 4/3/2024 1:43 PM EDT     Indication: Headache, new or worsening (Age >= 50y).     Comparison: None available.     Technique: Axial CT images were obtained of the head without contrast administration.  Coronal reconstructions were performed.  Automated exposure control and iterative reconstruction methods were used.        Findings:     There is an approximately 19 x 16 x 11 mm sized mass in the superior posterior right frontal lobe with surrounding hypodensity consistent with vasogenic edema. There is no significant midline shift.     There is no evidence of acute territorial infarction.     There is no acute intracranial hemorrhage. There are no extra-axial collections.     Ventricles and CSF spaces are symmetrically prominent. No mass effect nor  hydrocephalus.     Mild foamy secretions are noted in the left sphenoid sinus. There is trace mucosal thickening of the bilateral ethmoid air cells. The mastoid air cells are clear.     Osseous structures and orbits appear intact.        Impression:       Impression:  Right posterior frontal mass with vasogenic edema, suggestive of  intracranial metastasis in a patient with known primary malignancy. MRI of the brain with and without contrast is recommended for further assessment     These findings were discussed with Dr. Joyce in the emergency department on 4/3/2024 at 2:05 p.m. eastern standard time.        Electronically Signed: Charlene Mcconnell MD    4/3/2024 2:06 PM EDT    Workstation ID: [id number]     CT Lumbar Spine Without Contrast [[id number]] Resulted: 04/03/24 1404       Updated: 04/03/24 1351                   Assessment & Plan          This is a 73 y.o. 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           The patient desires to be as follows:     CODE STATUS:     Full code        RAHUL CEDILLO (Spouse)  [phone] (Mobile) is the designated person to make medical decisions on the patient's behalf if He is incapable of doing so. This was clarified with patient and/or next of kin on 4/3/2024 during the course of this H&P.     Admission Status:  I believe this patient meets inpaatinet   status.     Expected Length of Stay: >2 midnights      PDMP and Medication Dispenses via Sidebar reviewed and consistent with patient reported medications.     I discussed the patient's findings and my recommendations with patient.

## 2024-04-04 NOTE — DISCHARGE PLACEMENT REQUEST
"RaphaelAravind (73 y.o. Male)       Date of Birth   1950    Social Security Number       Address   [address] [place] [address] [address] [place] [address]    Home Phone       MRN   [id number]       Baptism   Non-Jewish    Marital Status                               Admission Date   4/3/24    Admission Type   Emergency    Admitting Provider   Boo Christie MD    Attending Provider   Gerry Aranda MD    Department, Room/Bed   [facility] 3A MEDICAL INPATIENT, 303/1       Discharge Date       Discharge Disposition       Discharge Destination                                 Attending Provider: Gerry Aranda MD    Allergies: No Known Allergies    Isolation: None   Infection: None   Code Status: CPR    Ht: 190.5 cm (75\")   Wt: 54.7 kg (120 lb 9.5 oz)    Admission Cmt: None   Principal Problem: Brain edema [G93.6]                   Active Insurance as of 4/3/2024       Primary Coverage       Payor Plan Insurance Group Employer/Plan Group    HUMANA MEDICARE REPLACEMENT HUMANA MED ADV HMO [id number]       Payor Plan Address Payor Plan Phone Number Payor Plan Fax Number Effective Dates    [address] [address] [phone]  1/1/2023 - None Entered    [facility] [address]         Subscriber Name Subscriber Birth Date Member ID       ARAVIND CEDILLO 1950 [id number]                     Emergency Contacts        (Rel.) Home Phone Work Phone Mobile Phone    RAHUL CEDILLO JAVIER (Spouse) [phone] -- [phone]                "

## 2024-04-04 NOTE — PLAN OF CARE
Goal Outcome Evaluation:         Pt currently off unit for scheduled MRI's. Vital signs have been stable. Plan of care ongoing.

## 2024-04-04 NOTE — CONSULTS
Hematology/Oncology Inpatient Consultation    Patient name: Aravind Lim  : 1950  MRN: [id number]  Referring Provider: Dr. Christie  Reason for Consultation: New metastatic disease of the brain    Chief complaint: Weakness, leg pain    History of present illness:    Aravind Lim is a 73 y.o. male who presented to [facility] on 4/3/2024 with complaints of weakness and numbness in his left leg for the past 3 weeks.  He had missed his office appointment hide when staff called, they were informed by the spouse that he was in severe pain and could not feel his left leg.  He was asked to go to the ED for further evaluation.  He reports that significantly worsened over the last day.  He also has new headache and vision changes.  He denies cough, fever, chest pain, abdominal pain.    In the ED he was hemodynamically stable.  CT imaging of the head without contrast showed a right posterior frontal mass with vasogenic edema suggestive of intracranial metastasis.    4/3/24 CT lumbar spine  Impression:   1. New 7 mm pulmonary nodule at the left lung base with spiculated borders concerning for metastatic disease.  2. Multilevel degenerative changes in the lumbar spine. At level L4/5 there is moderate to severe narrowing of the right neural foramen with suspected contact with the exiting right L4 nerve root.  3. Urinary bladder distention extending into the lower abdomen.  4. Infrarenal abdominal aortic aneurysm measuring 3.3 cm.  5. Heterogeneous osseous lesion in the L4 vertebral body stable in appearance without pathologic fracture.    24 MRI brain with and without  Enhancing mass seen within the high right frontal lobe with surrounding vasogenic edema likely representing metastatic disease. An additional smaller mass is seen just inferiorly and laterally. No additional enhancing masses identified. No evidence of   hemorrhage, or midline shift. No evidence of recent or acute ischemia.    24   Hematology/Oncology was consulted.  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  Currently on treatment with FOLFOX and nivolumab.  Most recent treatment was cycle 11 on 3/6/2024.    Oncology history is summarized as below:  Initially presented with dysphagia appetite change with 40 pound weight loss.  Endoscopies August 9, 2023 showed a mass in the lower left third of esophagus.  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  CT imaging 8/15/2023 with esophageal mass and involvement of the proximal stomach, enlarged mesenteric lymph node.  PET CT imaging on 8/21/2023 with large distal esophageal mass extending to the digastric cardia compatible with neoplasm.  Hypermetabolic node in the right thoracic inlet compatible with metastatic disease.  There is a bone lesion of L4.  [facility] NGS negative for HER2, low TMB, FGF R2 amplified, PD-L1 CPS 3. CT-guided biopsy of the osseous lesion at L4 showed metastatic poorly differentiated carcinoma.  He was initiated on FOLFOX with nivolumab on 9/18/2023.  Treatment has been complicated by cytopenias and chemotherapy-induced nausea and vomiting.  He did have a dose decrease of oxaliplatin with cycle 5.      He/She  has a past medical history of Anemia (08/04/2023), Anxiety (06/07/2022), Bilateral cataracts (06/23/2023), Colon polyp, Constipation (05/03/2022), COPD (chronic obstructive pulmonary disease), COVID-19 (08/16/2022), Dysphagia, Fatigue (05/03/2022), GERD (gastroesophageal reflux disease), History of hiatal hernia, Impacted cerumen of right ear (05/03/2022), and Prediabetes (05/10/2022).    PCP: Abiodun Steele MD    History:  Past Medical History:   Diagnosis Date    Anemia 08/04/2023    Anxiety 06/07/2022    Bilateral cataracts 06/23/2023    Colon polyp     Constipation 05/03/2022    BM daily w/ mineral oil.    COPD (chronic obstructive pulmonary disease)      COVID-19 08/16/2022    Dysphagia     Fatigue 05/03/2022    GERD (gastroesophageal reflux disease)     History of hiatal hernia     Impacted cerumen of right ear 05/03/2022    Prediabetes 05/10/2022   , History reviewed. No pertinent surgical history.,   Family History   Problem Relation Age of Onset    Diabetes Mother     Diabetes Father     Cancer Sister         Unknown of type   ,   Social History     Tobacco Use    Smoking status: Every Day     Current packs/day: 1.00     Types: Cigarettes   Vaping Use    Vaping status: Never Used   Substance Use Topics    Alcohol use: Never    Drug use: Never   ,   Medications Prior to Admission   Medication Sig Dispense Refill Last Dose    albuterol sulfate  (90 Base) MCG/ACT inhaler Inhale 2 puffs Every 4 (Four) Hours As Needed.       budesonide-formoterol (SYMBICORT) 160-4.5 MCG/ACT inhaler Inhale 2 puffs 2 (Two) Times a Day.   4/3/2024    docusate sodium (Colace) 100 MG capsule Take 1 capsule by mouth every night at bedtime. 30 capsule 2 4/2/2024    mineral oil liquid Take 30 mL by mouth Every Night.   4/2/2024    ondansetron (ZOFRAN) 8 MG tablet Take 1 tablet by mouth 3 (Three) Times a Day As Needed for Nausea or Vomiting. 30 tablet 5 4/2/2024    promethazine (PHENERGAN) 25 MG tablet Take 1 tablet by mouth Every 6 (Six) Hours As Needed for Nausea or Vomiting. 30 tablet 0 4/2/2024    traMADol (ULTRAM) 50 MG tablet Take 1 tablet by mouth Every 6 (Six) Hours As Needed for Moderate Pain. 30 tablet 0 4/3/2024   , Scheduled Meds:  budesonide-formoterol, 2 puff, Inhalation, BID - RT  dexAMETHasone, 6 mg, Intravenous, Q6H  sodium chloride, 10 mL, Intravenous, Q12H    , Continuous Infusions:  sodium chloride, 75 mL/hr, Last Rate: 75 mL/hr (04/03/24 2231)    , PRN Meds:    acetaminophen    albuterol sulfate HFA    senna-docusate sodium **AND** polyethylene glycol **AND** bisacodyl **AND** bisacodyl    Calcium Replacement - Follow Nurse / BPA Driven Protocol    docusate  "sodium    Magnesium Standard Dose Replacement - Follow Nurse / BPA Driven Protocol    ondansetron ODT **OR** ondansetron    oxyCODONE    Phosphorus Replacement - Follow Nurse / BPA Driven Protocol    Potassium Replacement - Follow Nurse / BPA Driven Protocol    sodium chloride    sodium chloride    traMADol   Allergies:  Patient has no known allergies.    Subjective     ROS:  Review of Systems   Constitutional:  Positive for fatigue. Negative for fever.   HENT:  Negative for congestion and nosebleeds.    Eyes:  Negative for pain.   Respiratory:  Negative for cough and shortness of breath.    Cardiovascular:  Negative for chest pain.   Gastrointestinal:  Negative for abdominal pain, blood in stool, diarrhea, nausea and vomiting.   Endocrine: Negative for cold intolerance and heat intolerance.   Genitourinary:  Negative for difficulty urinating.   Musculoskeletal:  Positive for arthralgias and back pain.   Skin:  Negative for rash.   Neurological:  Positive for headaches. Negative for dizziness.   Hematological:  Does not bruise/bleed easily.   Psychiatric/Behavioral:  Negative for behavioral problems.         Objective   Vital Signs:   /88 (BP Location: Left arm, Patient Position: Lying)   Pulse 91   Temp 97.8 °F (36.6 °C) (Oral)   Resp 14   Ht 190.5 cm (75\")   Wt 54.7 kg (120 lb 9.5 oz)   SpO2 96%   BMI 15.07 kg/m²     Physical Exam: (performed by MD)  Physical Exam  Constitutional:       Appearance: Normal appearance.   HENT:      Head: Normocephalic and atraumatic.   Eyes:      Pupils: Pupils are equal, round, and reactive to light.   Cardiovascular:      Rate and Rhythm: Normal rate and regular rhythm.      Pulses: Normal pulses.      Heart sounds: No murmur heard.  Pulmonary:      Effort: Pulmonary effort is normal.      Breath sounds: Normal breath sounds.   Abdominal:      General: There is no distension.      Palpations: Abdomen is soft. There is no mass.      Tenderness: There is no abdominal " tenderness.   Musculoskeletal:         General: Normal range of motion.      Cervical back: Normal range of motion and neck supple.   Skin:     General: Skin is warm.   Neurological:      General: No focal deficit present.      Mental Status: He is alert.   Psychiatric:         Mood and Affect: Mood normal.         Results Review:  Lab Results (last 48 hours)       Procedure Component Value Units Date/Time    Comprehensive Metabolic Panel [[id number]]  (Abnormal) Collected: 04/03/24 1641    Specimen: Blood Updated: 04/03/24 1715     Glucose 124 mg/dL      BUN 14 mg/dL      Creatinine 0.50 mg/dL      Sodium 135 mmol/L      Potassium 3.9 mmol/L      Chloride 96 mmol/L      CO2 31.0 mmol/L      Calcium 10.0 mg/dL      Total Protein 7.4 g/dL      Albumin 4.3 g/dL      ALT (SGPT) 16 U/L      AST (SGOT) 26 U/L      Alkaline Phosphatase 108 U/L      Total Bilirubin 0.3 mg/dL      Globulin 3.1 gm/dL      A/G Ratio 1.4 g/dL      BUN/Creatinine Ratio 28.0     Anion Gap 8.0 mmol/L      eGFR 108.4 mL/min/1.73     Narrative:      GFR Normal >60  Chronic Kidney Disease <60  Kidney Failure <15    The GFR formula is only valid for adults with stable renal function between ages 18 and 70.    Protime-INR [[id number]]  (Normal) Collected: 04/03/24 1641    Specimen: Blood Updated: 04/03/24 1657     Protime 10.3 Seconds      INR 0.94    CBC & Differential [[id number]]  (Abnormal) Collected: 04/03/24 1641    Specimen: Blood Updated: 04/03/24 1649    Narrative:      The following orders were created for panel order CBC & Differential.  Procedure                               Abnormality         Status                     ---------                               -----------         ------                     CBC Auto Differential[[id number]]        Abnormal            Final result                 Please view results for these tests on the individual orders.    CBC Auto Differential [[id number]]  (Abnormal) Collected: 04/03/24 1641    Specimen:  Blood Updated: 04/03/24 1649     WBC 8.90 10*3/mm3      RBC 4.16 10*6/mm3      Hemoglobin 12.3 g/dL      Hematocrit 38.0 %      MCV 91.3 fL      MCH 29.6 pg      MCHC 32.4 g/dL      RDW 14.6 %      RDW-SD 48.5 fl      MPV 9.5 fL      Platelets 262 10*3/mm3      Neutrophil % 76.5 %      Lymphocyte % 13.4 %      Monocyte % 8.7 %      Eosinophil % 0.7 %      Basophil % 0.4 %      Immature Grans % 0.3 %      Neutrophils, Absolute 6.81 10*3/mm3      Lymphocytes, Absolute 1.19 10*3/mm3      Monocytes, Absolute 0.77 10*3/mm3      Eosinophils, Absolute 0.06 10*3/mm3      Basophils, Absolute 0.04 10*3/mm3      Immature Grans, Absolute 0.03 10*3/mm3      nRBC 0.0 /100 WBC              Pending Results: MRI spine    Imaging Reviewed:   MRI Brain With & Without Contrast    Result Date: 4/4/2024  Impression: Enhancing mass seen within the high right frontal lobe with surrounding vasogenic edema likely representing metastatic disease. An additional smaller mass is seen just inferiorly and laterally. No additional enhancing masses identified. No evidence of hemorrhage, or midline shift. No evidence of recent or acute ischemia. Electronically Signed: Annie Avila MD  4/4/2024 2:52 AM EDT  Workstation ID: [id number]    CT Lumbar Spine Without Contrast    Result Date: 4/3/2024  Impression: 1. New 7 mm pulmonary nodule at the left lung base with spiculated borders concerning for metastatic disease. 2. Multilevel degenerative changes in the lumbar spine. At level L4/5 there is moderate to severe narrowing of the right neural foramen with suspected contact with the exiting right L4 nerve root. 3. Urinary bladder distention extending into the lower abdomen. 4. Infrarenal abdominal aortic aneurysm measuring 3.3 cm. 5. Heterogeneous osseous lesion in the L4 vertebral body stable in appearance without pathologic fracture. Electronically Signed: Jade Slaughter MD  4/3/2024 2:14 PM EDT  Workstation ID: [id number]    CT Head Without  Contrast    Result Date: 4/3/2024  Impression: Right posterior frontal mass with vasogenic edema, suggestive of  intracranial metastasis in a patient with known primary malignancy. MRI of the brain with and without contrast is recommended for further assessment These findings were discussed with Dr. Joyce in the emergency department on 4/3/2024 at 2:05 p.m. eastern standard time. Electronically Signed: Charlene Mcconnell MD  4/3/2024 2:06 PM EDT  Workstation ID: [id number]          Assessment & Plan     Aravindsocorro Lim is a 73 y.o. 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  Admitted with weakness of his legs and found to have a new brain mass in the right posterior frontal region    Esophageal adenocarcinoma  -Initially presented in August 2023, and is static disease to lumbar spine.  [facility] NGS negative for HER2, low TMB, FGF R2 amplified, PD-L1 CPS 3  -Treatment was started with FOLFOX and nivolumab September 2023.  Most recent cycle 11 given 3/6/2024  -Now with new brain mass in the right posterior frontal region, showing up to 1.9 x 1.8 cm.  Additional smaller mass inferiorly and laterally measuring up to millimeters.  Vasogenic edema seen surrounding these adjacent mass.    Brain mass  -Likely metastatic disease from his esophageal adenocarcinoma.  -Neurosurgery and radiation evaluation  -Continue steroids and antiseizure medications.    Myelosuppression  -Multifactorial.  Secondary to chemotherapy.  -Monitor CBC transfuse as needed    Further recommendations per Dr. 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  Patient presents with left leg pain, numbness and weakness which started last few  days.  He was asked to come to the emergency room yesterday with these symptoms he is brain MRI showed a mass in the right posterior frontal region up to 1.9 cm additional small mass inferiorly and laterally which was smaller.  There was vasogenic edema patient was started on dexamethasone IV.  When I talked to the patient he was wanting to not pursue any further aggressive treatment I did talk about palliative radiation to the brain which might help his symptoms.  He also has had MRI of cervical thoracic lumbar spinal cord which showed numerous enhancing lesions.  Osseous metastases and L4, T2.   If he chooses to we will consult hospice.    Thank you for this consult. We will be happy to follow along with you.     I have obtained complete history and perform physical exam along with review of labs, imaging.  I have completed the majority and substantive portion of medical decision making    Sophie Benito MD

## 2024-04-04 NOTE — CONSULTS
[facility] [place] [facility]   Inpatient Consult    Chief Complaint  Brain mets from esophageal adenocarcinoma      Diagnosis:    Diagnosis Plan   1. 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 Brain edema  Ambulatory Referral to [facility] ([facility])            Overall Stage   Stage IV metastatic esophageal adenocarcinoma       Pacemaker: no  Prior History of Radiation: no  Contraindications to Radiation: no  Patient Requires Pregnancy Test: No, patient is male      HPI:    Aravind Lim is a 73 y.o. male with history of metastatic esophageal adenocarcinoma. He presented to the emergency department at [facility] on 4/3/2024 after progressive weakness and numbness in his left leg over about a 3 week period.    In the ED a CT of the head was performed which showed a right posterior frontal mass with vasogenic edema worrisome for metastatic disease.     Additional subsequent imaging included the followin/3/24 CT lumbar spine  Impression:   1. New 7 mm pulmonary nodule at the left lung base with spiculated borders concerning for metastatic disease.  2. Multilevel degenerative changes in the lumbar spine. At level L4/5 there is moderate to severe narrowing of the right neural foramen with suspected contact with the exiting right L4 nerve root.  3. Urinary bladder distention extending into the lower abdomen.  4. Infrarenal abdominal aortic aneurysm measuring 3.3 cm.  5. Heterogeneous osseous lesion in the L4 vertebral body stable in appearance without pathologic fracture.     24 MRI brain with and without  Enhancing mass seen within the high right frontal lobe with surrounding vasogenic edema likely representing metastatic disease. An additional smaller mass is seen just inferiorly and laterally. No additional enhancing masses identified. No evidence of   hemorrhage, or midline shift. No evidence of recent or acute ischemia.    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 There is no evidence of   significant associated surrounding cord edema or cord compression.     Redemonstrated osseous metastasis at L4 without associated pathologic fracture. There is also questionable osseous metastasis at T2.     Generally mild spondylosis is present throughout without areas of high-grade spinal canal or neuroforaminal impingement.      The patient was started on Dexamethasone for his symptomatic brain or spine mets. He met with Neurosurgery and was offered a palliative resection to see if his symptoms and strength would subsequently improve. Radiation Oncology was also consulted to discuss treatment options.     Imaging:      MRI Cervical Spine With & Without Contrast    Result Date: 4/4/2024  MRI CERVICAL SPINE W WO CONTRAST, MRI LUMBAR SPINE W WO CONTRAST, MRI THORACIC SPINE W WO CONTRAST Date of Exam: 4/4/2024 12:30 AM EDT Indication: spine met.  Comparison: None available. Technique:  Routine multiplanar/multisequence sequence images of the cervical spine were obtained before and after the uneventful administration of Prohance.  Findings: Cervical spine: Marrow signal is preserved, without evidence of fracture or suspicious marrow replacing lesion. There is straightening of the usual cervical lordosis in addition to mild anterolisthesis of C4 on C5 and retrolisthesis of C5 on C6. There is  an exophytic enhancing mass arising from the dorsal aspect of the spinal cord at the level of the C2-3 disc space measuring 9 x 7 mm concerning for cord metastasis. No additional focal cord signal abnormality or abnormal enhancement. The paraspinal soft  tissues demonstrate no acute or suspicious findings. Relatively advanced multilevel spondylosis is present with areas of disc osteophyte complex  formation and facet arthropathy noted including moderate to severe spinal canal and neuroforaminal narrowing  at the C3-4 level and at C5-6. Thoracic spine: There is a faintly enhancing finding near the superior endplate of T2 possibly representing edematous spondylotic change or small osseous metastasis. No additional suspicious osseous lesion is evident. There is no evidence of fracture or malalignment. Numerous enhancing lesions are present concerning for metastatic disease throughout the thoracic cord, somewhat equivocal for leptomeningeal versus intramedullary in origin, with a lesion at T9 primarily appearing along the surface of the left dorsal cord and lesion at the level of T5-6 measuring 4 mm, primarily appearing intramedullary in origin. There is no evidence of significant thoracic spondylosis. The spinal canal and neural foramina remain patent throughout. The paraspinal soft tissues demonstrate findings better characterized on recent CT chest with regard to known metastatic disease. Lumbar spine: A enhancing and edematous lesion is noted at L4 consistent with osseous metastasis. There is no evidence of pathologic fracture. No additional suspicious marrow replacing lesion is evident. There is some minimal grade 1 anterolisthesis of L4 on L5 and mild retrolisthesis of L5 on S1. Numerous small enhancing foci are seen along the cauda equina nerve roots, most conspicuous at L3 measuring around 5 mm, some enhancement seen along the exiting nerve roots at multiple levels, for example on the left within the L4-5 neural foramen measuring 6 mm, also likely metastatic. Minimal lumbar spondylosis is evident, with some small areas of disc bulge and facet arthropathy. There is no evidence of high-grade spinal canal or neuroforaminal impingement.     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 There is no evidence of significant associated surrounding cord edema or cord compression. Redemonstrated osseous metastasis at L4 without associated pathologic fracture. There is also questionable osseous metastasis at T2. Generally mild spondylosis is present throughout without areas of high-grade spinal canal or neuroforaminal impingement. Electronically Signed: Jose Saini MD  4/4/2024 8:57 AM EDT  Workstation ID: [id number]    MRI Thoracic Spine With & Without Contrast    Result Date: 4/4/2024  MRI CERVICAL SPINE W WO CONTRAST, MRI LUMBAR SPINE W WO CONTRAST, MRI THORACIC SPINE W WO CONTRAST Date of Exam: 4/4/2024 12:30 AM EDT Indication: spine met.  Comparison: None available. Technique:  Routine multiplanar/multisequence sequence images of the cervical spine were obtained before and after the uneventful administration of Prohance.  Findings: Cervical spine: Marrow signal is preserved, without evidence of fracture or suspicious marrow replacing lesion. There is straightening of the usual cervical lordosis in addition to mild anterolisthesis of C4 on C5 and retrolisthesis of C5 on C6. There is  an exophytic enhancing mass arising from the dorsal aspect of the spinal cord at the level of the C2-3 disc space measuring 9 x 7 mm concerning for cord metastasis. No additional focal cord signal abnormality or abnormal enhancement. The paraspinal soft  tissues demonstrate no acute or suspicious findings. Relatively advanced multilevel spondylosis is present with areas of disc osteophyte complex formation and facet arthropathy noted including moderate to severe spinal canal and neuroforaminal narrowing  at the C3-4 level and at C5-6. Thoracic spine: There is a faintly enhancing finding near the superior endplate of T2 possibly representing edematous spondylotic change or small osseous metastasis. No additional suspicious osseous lesion is evident. There  is no evidence of fracture or malalignment. Numerous enhancing lesions are present concerning for metastatic disease throughout the thoracic cord, somewhat equivocal for leptomeningeal versus intramedullary in origin, with a lesion at T9 primarily appearing along the surface of the left dorsal cord and lesion at the level of T5-6 measuring 4 mm, primarily appearing intramedullary in origin. There is no evidence of significant thoracic spondylosis. The spinal canal and neural foramina remain patent throughout. The paraspinal soft tissues demonstrate findings better characterized on recent CT chest with regard to known metastatic disease. Lumbar spine: A enhancing and edematous lesion is noted at L4 consistent with osseous metastasis. There is no evidence of pathologic fracture. No additional suspicious marrow replacing lesion is evident. There is some minimal grade 1 anterolisthesis of L4 on L5 and mild retrolisthesis of L5 on S1. Numerous small enhancing foci are seen along the cauda equina nerve roots, most conspicuous at L3 measuring around 5 mm, some enhancement seen along the exiting nerve roots at multiple levels, for example on the left within the L4-5 neural foramen measuring 6 mm, also likely metastatic. Minimal lumbar spondylosis is evident, with some small areas of disc bulge and facet arthropathy. There is no evidence of high-grade spinal canal or neuroforaminal impingement.     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 There is no evidence of significant associated surrounding cord edema or cord compression. Redemonstrated osseous metastasis at L4 without associated pathologic fracture. There is also questionable osseous metastasis at T2. Generally mild spondylosis is present throughout without areas of high-grade  spinal canal or neuroforaminal impingement. Electronically Signed: Jose Saini MD  4/4/2024 8:57 AM EDT  Workstation ID: [id number]    MRI Lumbar Spine With & Without Contrast    Result Date: 4/4/2024  MRI CERVICAL SPINE W WO CONTRAST, MRI LUMBAR SPINE W WO CONTRAST, MRI THORACIC SPINE W WO CONTRAST Date of Exam: 4/4/2024 12:30 AM EDT Indication: spine met.  Comparison: None available. Technique:  Routine multiplanar/multisequence sequence images of the cervical spine were obtained before and after the uneventful administration of Prohance.  Findings: Cervical spine: Marrow signal is preserved, without evidence of fracture or suspicious marrow replacing lesion. There is straightening of the usual cervical lordosis in addition to mild anterolisthesis of C4 on C5 and retrolisthesis of C5 on C6. There is  an exophytic enhancing mass arising from the dorsal aspect of the spinal cord at the level of the C2-3 disc space measuring 9 x 7 mm concerning for cord metastasis. No additional focal cord signal abnormality or abnormal enhancement. The paraspinal soft  tissues demonstrate no acute or suspicious findings. Relatively advanced multilevel spondylosis is present with areas of disc osteophyte complex formation and facet arthropathy noted including moderate to severe spinal canal and neuroforaminal narrowing  at the C3-4 level and at C5-6. Thoracic spine: There is a faintly enhancing finding near the superior endplate of T2 possibly representing edematous spondylotic change or small osseous metastasis. No additional suspicious osseous lesion is evident. There is no evidence of fracture or malalignment. Numerous enhancing lesions are present concerning for metastatic disease throughout the thoracic cord, somewhat equivocal for leptomeningeal versus intramedullary in origin, with a lesion at T9 primarily appearing along the surface of the left dorsal cord and lesion at the level of T5-6 measuring 4 mm, primarily appearing  intramedullary in origin. There is no evidence of significant thoracic spondylosis. The spinal canal and neural foramina remain patent throughout. The paraspinal soft tissues demonstrate findings better characterized on recent CT chest with regard to known metastatic disease. Lumbar spine: A enhancing and edematous lesion is noted at L4 consistent with osseous metastasis. There is no evidence of pathologic fracture. No additional suspicious marrow replacing lesion is evident. There is some minimal grade 1 anterolisthesis of L4 on L5 and mild retrolisthesis of L5 on S1. Numerous small enhancing foci are seen along the cauda equina nerve roots, most conspicuous at L3 measuring around 5 mm, some enhancement seen along the exiting nerve roots at multiple levels, for example on the left within the L4-5 neural foramen measuring 6 mm, also likely metastatic. Minimal lumbar spondylosis is evident, with some small areas of disc bulge and facet arthropathy. There is no evidence of high-grade spinal canal or neuroforaminal impingement.     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 There is no evidence of significant associated surrounding cord edema or cord compression. Redemonstrated osseous metastasis at L4 without associated pathologic fracture. There is also questionable osseous metastasis at T2. Generally mild spondylosis is present throughout without areas of high-grade spinal canal or neuroforaminal impingement. Electronically Signed: Jose Saini MD  4/4/2024 8:57 AM EDT  Workstation ID: [id number]    MRI Brain With & Without Contrast    Result Date: 4/4/2024  MRI BRAIN W WO CONTRAST Date of Exam: 4/4/2024 12:26 AM EDT Indication: Brain met.  Comparison: 4/3/2024, 8/21/2023. Technique:  Routine multiplanar/multisequence sequence  images of the brain were obtained before and after the uneventful administration of Multihance. Findings: There is no diffusion restriction to suggest acute infarct. There is no evidence of acute or chronic intracranial hemorrhage. No midline shift. No abnormal extra-axial collections. Enhancing mass is present within the posterior high right frontal lobe measuring up to 1.9 x 1.8 cm (series 23 image 145). This measures up to 2.2 cm in craniocaudal dimension and appears to abut the dura as seen on the sagittal imaging. There is a smaller enhancing mass seen inferiorly and laterally measuring up to 8 mm (series 23 image 126). Vasogenic edema seen surrounding these adjacent masses. No additional enhancing mass identified. No significant FLAIR signal changes identified aside from edema. The major vascular flow voids appear intact. The basal ganglia, brainstem and cerebellum appear within normal limits. Calvarial and superficial soft tissue signal is within normal limits. Orbits appear unremarkable. The paranasal sinuses and the mastoid air cells appear well aerated. Midline structures are intact.     Impression: Enhancing mass seen within the high right frontal lobe with surrounding vasogenic edema likely representing metastatic disease. An additional smaller mass is seen just inferiorly and laterally. No additional enhancing masses identified. No evidence of hemorrhage, or midline shift. No evidence of recent or acute ischemia. Electronically Signed: Annie Avila MD  4/4/2024 2:52 AM EDT  Workstation ID: [id number]    CT Lumbar Spine Without Contrast    Result Date: 4/3/2024  CT LUMBAR SPINE WO CONTRAST Date of Exam: 4/3/2024 1:43 PM EDT Indication: Low back pain, no red flags, no prior management. Comparison: CT abdomen and pelvis 3/4/2024 Technique: Axial CT images were obtained of the lumbar spine without contrast administration.  Sagittal and coronal reconstructions were performed.  Automated exposure control and  iterative reconstruction methods were used. Findings: OSSEOUS STRUCTURES: Evaluation of the lumbar spine demonstrates a mildly heterogeneous thin-walled sclerotic lesion in the posterior margin of the L4 vertebral body measuring 1.8 x 2.3 cm not significantly changed from prior exam. No evidence for pathologic fracture. ALIGNMENT: L5 demonstrates 4 mm retrolisthesis on S1. DISC SPACE: There is advanced intervertebral disc base narrowing at L5/S1 with vacuum disc and sclerotic endplate changes. JOINTS: Multilevel facet hypertrophy is noted. SOFT TISSUES: Evaluation of lung parenchyma demonstrates a mildly spiculated nodule at the left lung base (image 32 of series 3) concerning given the patient's history of esophageal malignancy. This measures 7 mm. The distal esophagus is diffusely thickened. Additional findings include a granuloma at the left lung base. There is an infrarenal abdominal aortic aneurysm measuring 3.3 x 3 cm. The urinary bladder is distended extending into the lower abdomen. LEVELS: L1/2: Mild broad-based disc bulge and early facet changes with mild bilateral neural foramina stenosis. L2/3: There is a broad-based disc bulge flattening the ventral thecal sac with ligamentum flavum hypertrophy and facet changes with mild to moderate neural foramina stenosis. L3/4: Broad-based disc bulge with ligamentum flavum hypertrophy and facet changes. There are endplate osteophytes with mild to moderate right and early moderate left neural foramina stenosis. L4/5: Broad-based disc bulge effacing the ventral thecal sac. There is ligamentum flavum hypertrophy and facet changes with moderate to severe right and mild to moderate left neural foramina stenosis. Contact of the exiting right L4 nerve root is suspected. L5/S1: Mild broad-based disc bulge with endplate osteophytes with facet hypertrophy with mild neural foramina stenosis.     Impression: 1. New 7 mm pulmonary nodule at the left lung base with spiculated  borders concerning for metastatic disease. 2. Multilevel degenerative changes in the lumbar spine. At level L4/5 there is moderate to severe narrowing of the right neural foramen with suspected contact with the exiting right L4 nerve root. 3. Urinary bladder distention extending into the lower abdomen. 4. Infrarenal abdominal aortic aneurysm measuring 3.3 cm. 5. Heterogeneous osseous lesion in the L4 vertebral body stable in appearance without pathologic fracture. Electronically Signed: Jade Slaughter MD  4/3/2024 2:14 PM EDT  Workstation ID: [id number]    CT Head Without Contrast    Result Date: 4/3/2024  CT HEAD WO CONTRAST Date of Exam: 4/3/2024 1:43 PM EDT Indication: Headache, new or worsening (Age >= 50y). Comparison: None available. Technique: Axial CT images were obtained of the head without contrast administration.  Coronal reconstructions were performed.  Automated exposure control and iterative reconstruction methods were used. Findings: There is an approximately 19 x 16 x 11 mm sized mass in the superior posterior right frontal lobe with surrounding hypodensity consistent with vasogenic edema. There is no significant midline shift. There is no evidence of acute territorial infarction. There is no acute intracranial hemorrhage. There are no extra-axial collections. Ventricles and CSF spaces are symmetrically prominent. No mass effect nor hydrocephalus.  Mild foamy secretions are noted in the left sphenoid sinus. There is trace mucosal thickening of the bilateral ethmoid air cells. The mastoid air cells are clear.  Osseous structures and orbits appear intact.     Impression: Right posterior frontal mass with vasogenic edema, suggestive of  intracranial metastasis in a patient with known primary malignancy. MRI of the brain with and without contrast is recommended for further assessment These findings were discussed with Dr. Joyce in the emergency department on 4/3/2024 at 2:05 p.m. eastern standard time.  Electronically Signed: Charlene Mcconnell MD  4/3/2024 2:06 PM EDT  Workstation ID: [id number]       Pathology:    8/29/2024  Final Diagnosis   Bone, L4, core biopsy:    Metastatic poorly differentiated carcinoma, see comment       Labs:    Lab Results   Component Value Date    CREATININE 0.50 (L) 04/03/2024                 Problem List:    Brain edema         Medications:  No current facility-administered medications on file prior to encounter.     Current Outpatient Medications on File Prior to Encounter   Medication Sig Dispense Refill    albuterol sulfate  (90 Base) MCG/ACT inhaler Inhale 2 puffs Every 4 (Four) Hours As Needed.      budesonide-formoterol (SYMBICORT) 160-4.5 MCG/ACT inhaler Inhale 2 puffs 2 (Two) Times a Day.      docusate sodium (Colace) 100 MG capsule Take 1 capsule by mouth every night at bedtime. 30 capsule 2    mineral oil liquid Take 30 mL by mouth Every Night.      ondansetron (ZOFRAN) 8 MG tablet Take 1 tablet by mouth 3 (Three) Times a Day As Needed for Nausea or Vomiting. 30 tablet 5    promethazine (PHENERGAN) 25 MG tablet Take 1 tablet by mouth Every 6 (Six) Hours As Needed for Nausea or Vomiting. 30 tablet 0    traMADol (ULTRAM) 50 MG tablet Take 1 tablet by mouth Every 6 (Six) Hours As Needed for Moderate Pain. 30 tablet 0          Allergies:  No Known Allergies      Family History:  Family History   Problem Relation Age of Onset    Diabetes Mother     Diabetes Father     Cancer Sister         Unknown of type           Social History:  Social History     Socioeconomic History    Marital status:    Tobacco Use    Smoking status: Every Day     Current packs/day: 1.00     Types: Cigarettes   Vaping Use    Vaping status: Never Used   Substance and Sexual Activity    Alcohol use: Never    Drug use: Never    Sexual activity: Defer         Review of Systems:    Review of Systems   Constitutional:  Positive for activity change and fatigue.   Musculoskeletal:  Positive for gait  "problem, joint swelling and myalgias.        Left lower leg weakness, numbness, and pain         Vital Signs:  /86 (BP Location: Left arm, Patient Position: Lying)   Pulse 70   Temp 97.9 °F (36.6 °C)   Resp 14   Ht 190.5 cm (75\")   Wt 54.7 kg (120 lb 9.5 oz)   SpO2 99%   BMI 15.07 kg/m²   Estimated body mass index is 15.07 kg/m² as calculated from the following:    Height as of this encounter: 190.5 cm (75\").    Weight as of this encounter: 54.7 kg (120 lb 9.5 oz).  There were no vitals filed for this visit.      ECOG: Capable of only limited selfcare; confined to bed or chair more than 50% of waking hours = 3    Physical Exam  Constitutional:       General: He is not in acute distress.  HENT:      Head: Normocephalic and atraumatic.   Pulmonary:      Effort: Pulmonary effort is normal. No respiratory distress.   Neurological:      Mental Status: He is alert and oriented to person, place, and time.      Comments: Patient lying in bed during our visit, gait not assessed   Psychiatric:         Mood and Affect: Mood normal.         Behavior: Behavior normal.          Result Review :             Assessment:    Aravind Lim is a 73 y.o. 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 I was consulted to discuss palliative treatment options.       Plan:    We reviewed the patient's work-up and prior treatment history. We discussed his new symptoms and imaging findings consistent with symptomatic brain mets and leptomeningeal/intramedullary spine mets. We discussed different palliative treatment options including surgery followed by radiation therapy, palliative radiation alone, or supportive care/hospice. We discussed how radiation therapy is planned and delivered. We discussed potential acute and late side effects. The patient was able to ask questions and have them " answered to his apparent satisfaction. He would like to pursue hospice care at this time. No further intervention will be planned and no additional follow-up will be scheduled. The patient was encouraged to reach out to our department should he change his mind. The patient and family agreed with the plan for hospice and will notify us if things change.        I spent 60 minutes caring for Aravind on this date of service. This time includes time spent by me in the following activities:preparing for the visit, reviewing tests, obtaining and/or reviewing a separately obtained history, performing a medically appropriate examination and/or evaluation , counseling and educating the patient/family/caregiver, referring and communicating with other health care professionals , documenting information in the medical record, and independently interpreting results and communicating that information with the patient/family/caregiver  Follow Up   No follow-ups on file.  Patient was given instructions and counseling regarding his condition or for health maintenance advice. Please see specific information pulled into the AVS if appropriate.     Teja Sinha MD

## 2024-04-04 NOTE — PLAN OF CARE
Problem: Adult Inpatient Plan of Care  Goal: Plan of Care Review  Outcome: Ongoing, Progressing  Goal: Patient-Specific Goal (Individualized)  Outcome: Ongoing, Progressing  Goal: Absence of Hospital-Acquired Illness or Injury  Outcome: Ongoing, Progressing  Intervention: Identify and Manage Fall Risk  Recent Flowsheet Documentation  Taken 4/4/2024 1659 by Emelia Romero RN  Safety Promotion/Fall Prevention: safety round/check completed  Taken 4/4/2024 1429 by Emelia Romero RN  Safety Promotion/Fall Prevention: safety round/check completed  Taken 4/4/2024 1200 by Emelia Romero RN  Safety Promotion/Fall Prevention: safety round/check completed  Taken 4/4/2024 1003 by Emelia Romero RN  Safety Promotion/Fall Prevention: safety round/check completed  Taken 4/4/2024 0822 by Emelia Romero RN  Safety Promotion/Fall Prevention: safety round/check completed  Intervention: Prevent Skin Injury  Recent Flowsheet Documentation  Taken 4/4/2024 0822 by Emelia Romero RN  Body Position: position changed independently  Intervention: Prevent and Manage VTE (Venous Thromboembolism) Risk  Recent Flowsheet Documentation  Taken 4/4/2024 0822 by Emelia Romero RN  VTE Prevention/Management:   bilateral   sequential compression devices off  Range of Motion: active ROM (range of motion) encouraged  Intervention: Prevent Infection  Recent Flowsheet Documentation  Taken 4/4/2024 0822 by Emelia Romero RN  Infection Prevention: single patient room provided  Goal: Optimal Comfort and Wellbeing  Outcome: Ongoing, Progressing  Intervention: Monitor Pain and Promote Comfort  Recent Flowsheet Documentation  Taken 4/4/2024 1412 by Emelia Romero RN  Pain Management Interventions: see MAR  Taken 4/4/2024 0933 by Emelia Romero RN  Pain Management Interventions: see MAR  Intervention: Provide Person-Centered Care  Recent Flowsheet Documentation  Taken 4/4/2024 0822 by Heather  KARLA Kapoor  Trust Relationship/Rapport:   care explained   choices provided   emotional support provided   empathic listening provided  Goal: Readiness for Transition of Care  Outcome: Ongoing, Progressing     Problem: Fall Injury Risk  Goal: Absence of Fall and Fall-Related Injury  Outcome: Ongoing, Progressing  Intervention: Promote Injury-Free Environment  Recent Flowsheet Documentation  Taken 4/4/2024 1659 by Emelia Romero RN  Safety Promotion/Fall Prevention: safety round/check completed  Taken 4/4/2024 1429 by Emelia Romero RN  Safety Promotion/Fall Prevention: safety round/check completed  Taken 4/4/2024 1200 by Emelia Romero RN  Safety Promotion/Fall Prevention: safety round/check completed  Taken 4/4/2024 1003 by Emelia Romero RN  Safety Promotion/Fall Prevention: safety round/check completed  Taken 4/4/2024 0822 by Emelia Romero RN  Safety Promotion/Fall Prevention: safety round/check completed     Problem: Skin Injury Risk Increased  Goal: Skin Health and Integrity  Outcome: Ongoing, Progressing   Goal Outcome Evaluation:   Pt has been resting in bed with family at bedside all day. He met with Dr. Benito, neurosurgery, and case management to discuss further plans of care. Oxycodone PRN was given for pain and Zofran PRN was administered for nausea; pt tolerated well. No concerns and call light within reach.

## 2024-04-04 NOTE — CONSULTS
[facility]   Consult Note    Patient Name: Aravind Lim  : 1950  MRN: [id number]  Primary Care Physician:  Abiodun Steele MD  Referring Physician: No Known Provider  Date of admission: 4/3/2024    Subjective   Subjective     Reason for Consult/ Chief Complaint: Headache, lower extremity weakness    HPI:  Aravind Lim is a 73 y.o. male that presented to [facility] emergency department on 4/3/2024 with complaints of headache, weakness and numbness in left lower extremity.  He is currently being treated for metastatic esophageal cancer that was diagnosed in 2023.  ED workup included CT head that showed concerns for metastatic spread intracranially and neurosurgery was consulted for further evaluation and offer recommendations.    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  Enlarged mesenteric lymph node     2023 -PET/CT with large distal esophageal mass extending to the digastric cardia compatible with neoplasm.  Hypermetabolic node in the right thoracic inlet compatible with metastatic disease.  Bone lesion of L4 is most compatible with metastatic disease.  Largest lung nodule was not hypermetabolic likely benign process  CARIS NGS negative for HER 2, low TMB, FGFR2 amplified, PDL1 CPS 3        23 - CT guided biopsy of L4 - metastatic poorly differentiated carcinoma. Likely from esophageal primary.     2023 cycle 1 FOLFOX Opdivo  10/16/2023 - C 3 FOLFOX Opdivo.  10/30/2023 - C4 FOLFOX opdivo  23 - C5 FOLFOX opdivo decrease dose of oxaliplatin to 65 mg/m2  Continues to be on FOLFOX Opdivo  2023 -CT chest abdomen pelvis with decrease in size of the right supraclavicular and right paratracheal lymphadenopathy.  Circumferential  masslike thickening of the distal esophagus consistent with the known primary.  Stable central mesenteric lymphadenopathy.  Increased sclerosis of the L4 vertebral body lesion indicating posttreatment change.  Cholelithiasis.     Continue FOLFOX Opdivo, has had some rash, which improved after opdivo was held  2/12/24- FOLFOX with no opdivo.     2/7/24 - CT CAP - Moderate distal esophageal wall thickening, similar since the study dated 11/22/2023. Mesenteric lymph nodes appear mildly prominent and increased in number, and there is nonfocal stranding within the mesenteric fat. 2.4 x 0.8 cm nodular focus within the mesentery on series 2, image 47. Metastatic involvement cannot be excluded. 2.3 cm faint sclerotic lesion posteriorly within the L4 vertebral body, not significantly changed.      3/4/24 - CT CAP with abdomina pain - Left mid abdominal small bowel thickening is new since 2/7/2024, nonspecific but favored to represent infectious/inflammatory enteritis. Central mesenteric stranding with mildly prominent central mesenteric lymph nodes, unchanged from the prior examination. No new or progressive adenopathy is seen. Lower thoracic esophageal abnormal concentric thickening corresponds to the patient's known malignancy, and appears grossly unchanged, to the extent that it is included in the imaging field-of-view. 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 Moderate to large colonic stool burden     Upon my evaluation, patient is awake and alert and oriented.  Family is at bedside.  Patient reports history of left hip pain and leg weakness that has slowly worsened over the last 3 weeks.  Reports he is not able to walk or stand on that leg.  He reports no left arm changes.  He does report some intermittent headaches and dizziness.  He reports chronic visual changes with blurriness and double vision with no acute worsening.  He is hard of hearing but denies any acute hearing changes.  He denies any  speech issues.  He reports no cognitive changes which have been corroborated by family.  Patient has undergone chemotherapy with last infusion in February of this year.  He was paused on therapy for blood dyscrasias.     Review of Systems   All systems were reviewed and negative except for: Those mentioned in HPI.    Personal History     Past Medical History:   Diagnosis Date    Anemia 08/04/2023    Anxiety 06/07/2022    Bilateral cataracts 06/23/2023    Colon polyp     Constipation 05/03/2022    BM daily w/ mineral oil.    COPD (chronic obstructive pulmonary disease)     COVID-19 08/16/2022    Dysphagia     Fatigue 05/03/2022    GERD (gastroesophageal reflux disease)     History of hiatal hernia     Impacted cerumen of right ear 05/03/2022    Prediabetes 05/10/2022       History reviewed. No pertinent surgical history.    Family History: family history includes Cancer in his sister; Diabetes in his father and mother. Otherwise pertinent FHx was reviewed and not pertinent to current issue.    Social History:  reports that he has been smoking cigarettes. He does not have any smokeless tobacco history on file. He reports that he does not drink alcohol and does not use drugs.    Home Medications:  albuterol sulfate HFA, budesonide-formoterol, docusate sodium, mineral oil, ondansetron, promethazine, and traMADol    Allergies:  No Known Allergies    Objective    Objective     Vitals:   Temp:  [97.6 °F (36.4 °C)-98.5 °F (36.9 °C)] 97.8 °F (36.6 °C)  Heart Rate:  [64-91] 91  Resp:  [10-18] 14  BP: (136-176)/(67-92) 152/88    Physical Exam:   Alert and oriented by 3  Speech is intact and coherent articulate with good content and production  Cranial nerves III through XII are grossly intact with pupils symmetric and reactive and no gaze paresis or nystagmus  Sensation is intact to soft touch   in both upper and lower extremities  Proprioception is intact in both upper and lower extremities symmetric  Motor strength is 5/5 in  both upper extremities, 2+ -3/5 left lower extremity strength   Gait deferred  No dysmetria or dysdiadochokinesia      Result Review    Result Review:  I have personally reviewed the results from the time of this admission to 4/4/2024 08:32 EDT and agree with these findings:  []  Laboratory list / accordion  []  Microbiology  [x]  Radiology  []  EKG/Telemetry   []  Cardiology/Vascular   []  Pathology  []  Old records  []  Other:  Most notable findings include: Large enhancing intracranial mass along posterior right frontal lobe with surrounding vasogenic edema.  There is another enhancing lesion that is much smaller that is inferior and laterally located along the right frontal lobe with vasogenic edema as well.  No evidence of midline shift or acute hemorrhage.  There are numerous enhancing lesions throughout cervical and thoracic spinal cord.  There are enhancing lesions along the cauda equina nerve roots.  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  No significant surrounding cord edema or cord compression.  Redemonstration of osseous metastasis at L4 with no fracture.  Also questionable osseous metastases at T2.    Assessment & Plan   Assessment / Plan     Brief Patient Summary:  Aravind Lim is a 73 y.o. 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  Neurosurgery does feel that the larger intracranial lesion is causing his left leg weakness.  I had a long discussion with patient and family regarding surgical versus nonsurgical options. Neurosurgery does feel that removing the larger tumor would improve his quality of life and would consider surgical resection.  I did discuss that surgical resection would not be curative and patient would need to continue with palliative adjuvant therapy per radiation and  oncology.  Patient and family would like to discuss options.  Neurosurgery will continue to follow and await patient's decision.  Please call for any questions or concerns.    Metastatic brain lesion  active Hospital Problems:  Active Hospital Problems    Diagnosis     **Brain edema      Plan:     -Palliative surgical resection offered-awaiting patient decision  - Continue Decadron  -Recommend radiation/oncology consult  - Please call for any questions or concerns    Assessment findings were discussed with Dr. Morgan who agrees with plan of care      Catalina Olivarez DNP, APRN  8:36 EDT

## 2024-04-04 NOTE — THERAPY EVALUATION
Patient Name: Aravind Lim  : 1950    MRN: [id number]                              Today's Date: 2024       Admit Date: 4/3/2024    Visit Dx:     ICD-10-CM ICD-9-CM   1. Metastatic adenocarcinoma to brain  C79.31 198.3   2. Brain edema  G93.6 348.5     Patient Active Problem List   Diagnosis    Esophageal mass    Hemorrhoids    Hiatal hernia with gastroesophageal reflux    Chronic obstructive lung disease    Colon polyp    History of hiatal hernia    Dysphagia    Diverticulosis    Smoker    Dehydration    Esophageal adenocarcinoma    Brain edema     Past Medical History:   Diagnosis Date    Anemia 2023    Anxiety 2022    Bilateral cataracts 2023    Colon polyp     Constipation 2022    BM daily w/ mineral oil.    COPD (chronic obstructive pulmonary disease)     COVID-19 2022    Dysphagia     Fatigue 2022    GERD (gastroesophageal reflux disease)     History of hiatal hernia     Impacted cerumen of right ear 2022    Prediabetes 05/10/2022     History reviewed. No pertinent surgical history.   General Information       Row Name 24 1419          Physical Therapy Time and Intention    Document Type evaluation  -EJ     Mode of Treatment physical therapy  -EJ       Row Name 24 1419          General Information    Patient Profile Reviewed yes  -EJ     Prior Level of Function independent:;bed mobility;transfer;gait;min assist:;dressing;bathing;ADL's  Per pt he uses a rollator to ambulate with.  Spouse assists with lower body dressing, and some bathing aspects.  Wife drives.  -EJ     Existing Precautions/Restrictions fall;oxygen therapy device and L/min  -EJ     Barriers to Rehab medically complex;previous functional deficit;hearing deficit  -EJ       Row Name 24 1419          Living Environment    People in Home spouse  -EJ     Name(s) of People in Home Yessi (wife)  -EJ       Row Name 24 1419          Home Main Entrance    Number of Stairs, Main  Entrance three  -EJ     Stair Railings, Main Entrance railings safe and in good condition  -[facility] Name 04/04/24 1419          Stairs Within Home, Primary    Number of Stairs, Within Home, Primary none  -[facility] Name 04/04/24 1419          Cognition    Orientation Status (Cognition) oriented x 4  -[facility] Name 04/04/24 1419          Safety Issues, Functional Mobility    Impairments Affecting Function (Mobility) balance;endurance/activity tolerance;pain;strength  -               User Key  (r) = Recorded By, (t) = Taken By, (c) = Cosigned By      Initials Name Provider Type     Abigail Armstrong, PT Physical Therapist                   Mobility       [facility] Name 04/04/24 1421          Bed Mobility    Bed Mobility bed mobility (all) activities  -     All Activities, [place] (Bed Mobility) standby assist  -     Assistive Device (Bed Mobility) bed rails  -EJ       Row Name 04/04/24 1421          Sit-Stand Transfer    Sit-Stand [place] (Transfers) minimum assist (75% patient effort)  -     Assistive Device (Sit-Stand Transfers) walker, front-wheeled  -[facility] Name 04/04/24 1421          Gait/Stairs (Locomotion)    [place] Level (Gait) contact guard;minimum assist (75% patient effort)  -     Assistive Device (Gait) walker, front-wheeled  -     Distance in Feet (Gait) 4  -               User Key  (r) = Recorded By, (t) = Taken By, (c) = Cosigned By      Initials Name Provider Type    Abigail Sinclair, PT Physical Therapist                   Obj/Interventions       [facility] Name 04/04/24 1421          Range of Motion Comprehensive    Comment, General Range of Motion RLE AROM WFL.  LLE mod AROM deficits related to weakness.  -[facility] Name 04/04/24 1421          Strength Comprehensive (MMT)    Comment, General Manual Muscle Testing (MMT) Assessment RLE strength grossly 4-/5.  LLE strength grossly 3-/5.  Ankle PF and DF 2+/5  -[facility] Name 04/04/24 1421          Motor Skills     Motor Skills functional endurance  -EJ     Functional Endurance Poor+  -EJ       Row Name 04/04/24 1421          Balance    Balance Assessment sitting static balance;sitting dynamic balance;sit to stand dynamic balance;standing static balance;standing dynamic balance  -EJ     Static Sitting Balance modified independence  -EJ     Dynamic Sitting Balance standby assist  -EJ     Position, Sitting Balance unsupported;sitting edge of bed  -EJ     Sit to Stand Dynamic Balance minimal assist  -EJ     Static Standing Balance contact guard  -EJ     Dynamic Standing Balance contact guard;minimal assist  -EJ     Position/Device Used, Standing Balance supported;walker, front-wheeled  -EJ     Balance Interventions sitting;standing;sit to stand;supported;static;dynamic;minimal challenge  -EJ               User Key  (r) = Recorded By, (t) = Taken By, (c) = Cosigned By      Initials Name Provider Type    EJ Abigail Armstrong, PT Physical Therapist                   Goals/Plan       Row Name 04/04/24 1429          Bed Mobility Goal 1 (PT)    Activity/Assistive Device (Bed Mobility Goal 1, PT) bed mobility activities, all  -EJ     [place] Level/Cues Needed (Bed Mobility Goal 1, PT) modified independence  -EJ     Time Frame (Bed Mobility Goal 1, PT) long term goal (LTG);2 weeks  -       Row Name 04/04/24 1429          Transfer Goal 1 (PT)    Activity/Assistive Device (Transfer Goal 1, PT) transfers, all;walker, rolling  -EJ     [place] Level/Cues Needed (Transfer Goal 1, PT) standby assist  -EJ     Time Frame (Transfer Goal 1, PT) long term goal (LTG);2 weeks  -       Row Name 04/04/24 1429          Gait Training Goal 1 (PT)    Activity/Assistive Device (Gait Training Goal 1, PT) gait (walking locomotion);walker, rolling  -EJ     [place] Level (Gait Training Goal 1, PT) standby assist  -EJ     Time Frame (Gait Training Goal 1, PT) long term goal (LTG);2 weeks  -       Row Name 04/04/24 1429          Therapy  Assessment/Plan (PT)    Planned Therapy Interventions (PT) balance training;bed mobility training;gait training;patient/family education;neuromuscular re-education;postural re-education;stair training;strengthening;transfer training  -EJ               User Key  (r) = Recorded By, (t) = Taken By, (c) = Cosigned By      Initials Name Provider Type    EJ Abigail Armstrong, PT Physical Therapist                   Clinical Impression       Row Name 04/04/24 1423          Pain    Pain Intervention(s) Therapeutic presence;Emotional support;Repositioned  -EJ     Additional Documentation Pain Scale: FACES Pre/Post-Treatment (Group)  -EJ       Row Name 04/04/24 1423          Pain Scale: FACES Pre/Post-Treatment    Pain: FACES Scale, Pretreatment 4-->hurts little more  -EJ     Posttreatment Pain Rating 4-->hurts little more  -EJ     Pain Location - Side/Orientation Left  -EJ     Pain Location lower  -EJ     Pain Location - extremity  -EJ       Row Name 04/04/24 1423          Plan of Care Review    Plan of Care Reviewed With patient  -EJ     Outcome Evaluation Patient is a 72 y/o M who was admitted to [facility] on 4/3/24 with c/o severe pain in his LLE.  Pt also reported that he had lost feeling in his LLE.  CT head was positive for a R frontal mass with vascogenic edema that was suggestive of intracranial metastasis.  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  Neurosurgery does feel that the larger intracranial lesion is causing his left leg weakness - family is deciding if they want to proceed with surgical apporach or non-surgical approach.  During today's evaluation, pt was limited in mobility due to HTN as well as LLE pain/weakness.  He was SBA with bed mobility, min A with sit to stand, and CGA to min A with gait 4 feet.  Further ambulation deferred due to elevated BP -  nursing notified.  At this time, PT recommends SNF at discharge, unless patient and family decide to take patient home.  At that time then  PT would be recommended.  PT will continue to follow pt in acute setting as he is below his baseline function.  -       Row Name 04/04/24 1423          Therapy Assessment/Plan (PT)    Criteria for Skilled Interventions Met (PT) yes;skilled treatment is necessary  -EJ     Therapy Frequency (PT) 5 times/wk  -EJ     Predicted Duration of Therapy Intervention (PT) until discharge  -EJ       Row Name 04/04/24 1423          Vital Signs    Pre Systolic BP Rehab 169  -EJ     Pre Treatment Diastolic   -EJ     Intra Systolic BP Rehab 172  -EJ     Intra Treatment Diastolic BP 99  -EJ     Post Systolic BP Rehab 172  -EJ     Post Treatment Diastolic BP 76  -EJ     Pre Patient Position Sitting  -EJ     Intra Patient Position Standing  -EJ     Post Patient Position Supine  -EJ       Row Name 04/04/24 1423          Positioning and Restraints    Pre-Treatment Position in bed  -EJ     Post Treatment Position bed  -EJ     In Bed supine;call light within reach;encouraged to call for assist;exit alarm on;[place] [facility] [place]  -               User Key  (r) = Recorded By, (t) = Taken By, (c) = Cosigned By      Initials Name Provider Type    EJ Abigail Armstrong, PT Physical Therapist                   Outcome Measures       Row Name 04/04/24 1429          How much help from another person do you currently need...    Turning from your back to your side while in flat bed without using bedrails? 3  -EJ     Moving from lying on back to sitting on the side of a flat bed without bedrails? 3  -EJ     Moving to and from a bed to a chair (including a wheelchair)? 3  -EJ     Standing up from a chair using your arms (e.g., wheelchair, bedside chair)? 3  -EJ     Climbing 3-5 steps with a railing? 2  -EJ     To walk in hospital room? 3  -EJ     AM-PAC 6 Clicks Score (PT) 17  -EJ     Highest Level of Mobility  Goal 5 --> Static standing  -       Row Name 04/04/24 1429          Functional Assessment    Outcome Measure Options AM-PAC 6 Clicks Basic Mobility (PT)  -               User Key  (r) = Recorded By, (t) = Taken By, (c) = Cosigned By      Initials Name Provider Type    EJ Abigail Armstrong, PT Physical Therapist                                 Physical Therapy Education       Title: PT OT SLP Therapies (In Progress)       Topic: Physical Therapy (In Progress)       Point: Mobility training (Done)       Learning Progress Summary             Patient Acceptance, E, VU by  at 4/4/2024 1430                         Point: Home exercise program (Not Started)       Learner Progress:  Not documented in this visit.              Point: Body mechanics (Done)       Learning Progress Summary             Patient Acceptance, E, VU by  at 4/4/2024 1430                         Point: Precautions (Done)       Learning Progress Summary             Patient Acceptance, E, VU by  at 4/4/2024 1430                                         User Key       Initials Effective Dates Name Provider Type [facility] 07/11/23 -  Abigail Armstrong, PT Physical Therapist PT                  PT Recommendation and Plan  Planned Therapy Interventions (PT): balance training, bed mobility training, gait training, patient/family education, neuromuscular re-education, postural re-education, stair training, strengthening, transfer training  Plan of Care Reviewed With: patient  Outcome Evaluation: Patient is a 74 y/o M who was admitted to [facility] on 4/3/24 with c/o severe pain in his LLE.  Pt also reported that he had lost feeling in his LLE.  CT head was positive for a R frontal mass with vascogenic edema that was suggestive of intracranial metastasis.  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   Neurosurgery does feel that the larger intracranial lesion is causing his left leg weakness - family is deciding if they want to proceed with surgical apporach or non-surgical approach.  During today's evaluation, pt was limited in mobility due to HTN as well as LLE pain/weakness.  He was SBA with bed mobility, min A with sit to stand, and CGA to min A with gait 4 feet.  Further ambulation deferred due to elevated BP - nursing notified.  At this time, PT recommends SNF at discharge, unless patient and family decide to take patient home.  At that time then  PT would be recommended.  PT will continue to follow pt in acute setting as he is below his baseline function.     Time Calculation:         PT Charges       Row Name 04/04/24 1430             Time Calculation    Start Time 0851  -EJ      Stop Time 0909  -EJ      Time Calculation (min) 18 min  -EJ      PT Received On 04/04/24  -EJ      PT - Next Appointment 04/05/24  -EJ      PT Goal Re-Cert Due Date 04/18/24  -EJ         Time Calculation- PT    Total Timed Code Minutes- PT 0 minute(s)  -EJ                User Key  (r) = Recorded By, (t) = Taken By, (c) = Cosigned By      Initials Name Provider Type    EJ Abigail Armstrong, PT Physical Therapist                  Therapy Charges for Today       Code Description Service Date Service Provider Modifiers Qty    08227575454 HC PT EVAL MOD COMPLEXITY 4 4/4/2024 Abigail Armstrong, PT GP 1            PT G-Codes  Outcome Measure Options: AM-PAC 6 Clicks Basic Mobility (PT)  AM-PAC 6 Clicks Score (PT): 17  PT Discharge Summary  Anticipated Discharge Disposition (PT): skilled nursing facility    Abigail Armstrong, PT  4/4/2024

## 2024-04-04 NOTE — PLAN OF CARE
Goal Outcome Evaluation:  Plan of Care Reviewed With: patient           Outcome Evaluation: Patient is a 74 y/o M who was admitted to [facility] on 4/3/24 with c/o severe pain in his LLE.  Pt also reported that he had lost feeling in his LLE.  CT head was positive for a R frontal mass with vascogenic edema that was suggestive of intracranial metastasis.  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  Neurosurgery does feel that the larger intracranial lesion is causing his left leg weakness - family is deciding if they want to proceed with surgical apporach or non-surgical approach.  During today's evaluation, pt was limited in mobility due to HTN as well as LLE pain/weakness.  He was SBA with bed mobility, min A with sit to stand, and CGA to min A with gait 4 feet.  Further ambulation deferred due to elevated BP - nursing notified.  At this time, PT recommends SNF at discharge, unless patient and family decide to take patient home.  At that time then  PT would be recommended.  PT will continue to follow pt in acute setting as he is below his baseline function.      Anticipated Discharge Disposition (PT): skilled nursing facility

## 2024-04-04 NOTE — CASE MANAGEMENT/SOCIAL WORK
Continued Stay Note   Antoine     Patient Name: Aravind Lim  MRN: [id number]  Today's Date: 4/4/2024    Admit Date: 4/3/2024    Plan: Home with [facility], pending acceptance   Discharge Plan       Row Name 04/04/24 1731       Plan    Plan Home with [facility], pending acceptance    Plan Comments CM met with patient and family at bedside. Dr Benito was at bedside, pt is wanting to go home with [facility]. CM sent referral to basket and messaged liasion. Liasion is calling family tonight. CM gave liaison ED CM number if she needs anything further.             Expected Discharge Date and Time       Expected Discharge Date Expected Discharge Time    Apr 6, 2024         Judith Castellon RN    phone [phone]  fax [phone]

## 2024-04-05 ENCOUNTER — READMISSION MANAGEMENT (OUTPATIENT)
Dept: CALL CENTER | Facility: HOSPITAL | Age: 74
End: 2024-04-05
Payer: MEDICARE

## 2024-04-05 VITALS
OXYGEN SATURATION: 97 % | SYSTOLIC BLOOD PRESSURE: 174 MMHG | BODY MASS INDEX: 14.99 KG/M2 | TEMPERATURE: 97.6 F | RESPIRATION RATE: 24 BRPM | DIASTOLIC BLOOD PRESSURE: 94 MMHG | HEART RATE: 76 BPM | HEIGHT: 75 IN | WEIGHT: 120.59 LBS

## 2024-04-05 PROCEDURE — 63710000001 ONDANSETRON ODT 4 MG TABLET DISPERSIBLE: Performed by: STUDENT IN AN ORGANIZED HEALTH CARE EDUCATION/TRAINING PROGRAM

## 2024-04-05 PROCEDURE — 25810000003 SODIUM CHLORIDE 0.9 % SOLUTION: Performed by: STUDENT IN AN ORGANIZED HEALTH CARE EDUCATION/TRAINING PROGRAM

## 2024-04-05 PROCEDURE — 25010000002 HEPARIN LOCK FLUSH PER 10 UNITS: Performed by: INTERNAL MEDICINE

## 2024-04-05 PROCEDURE — 99232 SBSQ HOSP IP/OBS MODERATE 35: CPT | Performed by: NURSE PRACTITIONER

## 2024-04-05 PROCEDURE — 94799 UNLISTED PULMONARY SVC/PX: CPT

## 2024-04-05 PROCEDURE — 94664 DEMO&/EVAL PT USE INHALER: CPT

## 2024-04-05 PROCEDURE — 94761 N-INVAS EAR/PLS OXIMETRY MLT: CPT

## 2024-04-05 PROCEDURE — 25010000002 DEXAMETHASONE PER 1 MG: Performed by: STUDENT IN AN ORGANIZED HEALTH CARE EDUCATION/TRAINING PROGRAM

## 2024-04-05 RX ORDER — HEPARIN SODIUM (PORCINE) LOCK FLUSH IV SOLN 100 UNIT/ML 100 UNIT/ML
5 SOLUTION INTRAVENOUS AS NEEDED
Status: DISCONTINUED | OUTPATIENT
Start: 2024-04-05 | End: 2024-04-05 | Stop reason: HOSPADM

## 2024-04-05 RX ORDER — DEXAMETHASONE 6 MG/1
6 TABLET ORAL 2 TIMES DAILY WITH MEALS
Qty: 30 TABLET | Refills: 0 | Status: SHIPPED | OUTPATIENT
Start: 2024-04-05

## 2024-04-05 RX ADMIN — ONDANSETRON 4 MG: 4 TABLET, ORALLY DISINTEGRATING ORAL at 04:10

## 2024-04-05 RX ADMIN — SODIUM CHLORIDE 75 ML/HR: 9 INJECTION, SOLUTION INTRAVENOUS at 02:20

## 2024-04-05 RX ADMIN — BUDESONIDE AND FORMOTEROL FUMARATE DIHYDRATE 2 PUFF: 160; 4.5 AEROSOL RESPIRATORY (INHALATION) at 07:17

## 2024-04-05 RX ADMIN — DEXAMETHASONE SODIUM PHOSPHATE 6 MG: 4 INJECTION, SOLUTION INTRA-ARTICULAR; INTRALESIONAL; INTRAMUSCULAR; INTRAVENOUS; SOFT TISSUE at 03:55

## 2024-04-05 RX ADMIN — Medication 500 UNITS: at 12:41

## 2024-04-05 RX ADMIN — Medication 10 ML: at 09:53

## 2024-04-05 RX ADMIN — OXYCODONE 5 MG: 5 TABLET ORAL at 12:40

## 2024-04-05 RX ADMIN — DEXAMETHASONE SODIUM PHOSPHATE 6 MG: 4 INJECTION, SOLUTION INTRA-ARTICULAR; INTRALESIONAL; INTRAMUSCULAR; INTRAVENOUS; SOFT TISSUE at 09:52

## 2024-04-05 NOTE — OUTREACH NOTE
Prep Survey      Flowsheet Row Responses   [facility] facility patient discharged from? [place]   Is LACE score < 7 ? No   Eligibility Not Eligible   What are the reasons patient is not eligible? Hospice/Pallative Care   Does the patient have one of the following disease processes/diagnoses(primary or secondary)? Other   Prep survey completed? Yes            YVONNE XIAO - Registered Nurse

## 2024-04-05 NOTE — PLAN OF CARE
Goal Outcome Evaluation:      Pt with no complaints of pain or nausea at this time. Vital signs stable. Call light within reach. Plan of care ongoing.

## 2024-04-05 NOTE — NURSING NOTE
Pharmacy and Dr. Epstein notified regarding earlier IV infiltration with CT contrast. No new orders given. Pt with no complaints at site at this time.

## 2024-04-05 NOTE — CONSULTS
Nutrition Services    Patient Name: Aravind Lim  YOB: 1950  MRN: [id number]  Admission date: 4/3/2024    Comment:    Severe chronic disease related malnutrition related to hypermetabolism and chronic sub-optimal po intake  to meet est needs in the setting of metastatic cancer and chronic disease as evidenced by > 5% unintentional weight loss in the last month, po intake meeting <75% of est energy needs for > 1 month, and evidence of severe muscle and fat wasting per NFPE.    CLINICAL NUTRITION ASSESSMENT      Reason for Assessment 4/5 Nutrition assessment and POC related to BMI < 19 and MST score of 2.      H&P      Past Medical History:   Diagnosis Date    Anemia 08/04/2023    Anxiety 06/07/2022    Bilateral cataracts 06/23/2023    Colon polyp     Constipation 05/03/2022    BM daily w/ mineral oil.    COPD (chronic obstructive pulmonary disease)     COVID-19 08/16/2022    Dysphagia     Fatigue 05/03/2022    GERD (gastroesophageal reflux disease)     History of hiatal hernia     Impacted cerumen of right ear 05/03/2022    Prediabetes 05/10/2022       History reviewed. No pertinent surgical history.     Current Problems   Stage IV Metastatic esophageal cancer  -Pt elected for hospice care  -Neurosurgery signed off    Brain edema  -Symptomatic brain mets and leptomeningeal    L leg weakness    Hx dysphagia  -r/t esophageal mass    Hx of diverticulosis, colonic polyp, COPD, GERD, hiatal hernia     Encounter Information        Trending Narrative     4/5 Patient presented to ED on 4/3 after being instructed by staff when infusion appointment was missed.  Patient was having severe pain and loss of feeling in one leg due to discovered R posterior frontal mass with vasogenic edema. Patient has elected for hospice care per MD notes. RD visited patient at bedside.  Patient reports that his appetite is ok but definitely better at home. Patient reports that he is about to discharge today but that he does have  "Ensure that he will drink at home.  Patient reports no BM since admit but that he takes dulcolax at home and it allows him to go within an hour of taking it.   NFPE completed, consistent with nutrition diagnosis of malnutrition using AND/ASPEN criteria. See MSA below.        Anthropometrics        Current Height, Weight Height: 190.5 cm (75\")  Weight: 54.7 kg (120 lb 9.5 oz) (04/03/24 2016)       Usual Body Weight (UBW) Unable to obtain from patient       Trending Weight Hx     This admission: 4/5 120.6# (Last weight 4/3)             PTA: 8.6% weight loss in the last month    Wt Readings from Last 30 Encounters:   04/03/24 2016 54.7 kg (120 lb 9.5 oz)   04/03/24 1237 54.4 kg (120 lb)   03/29/24 1355 55.5 kg (122 lb 4.8 oz)   03/20/24 1005 59.9 kg (132 lb)   03/13/24 1110 59.1 kg (130 lb 6.4 oz)   03/08/24 1337 57.6 kg (127 lb)   03/06/24 0914 59.9 kg (132 lb)   03/06/24 0915 59.9 kg (132 lb)   03/04/24 0800 59 kg (130 lb)   02/26/24 0901 58.7 kg (129 lb 8 oz)   02/19/24 0931 58.4 kg (128 lb 12.8 oz)   02/14/24 1433 58.7 kg (129 lb 4.8 oz)   02/12/24 0836 60.1 kg (132 lb 8 oz)   02/05/24 0808 59.4 kg (131 lb)   02/05/24 0814 59.4 kg (131 lb)   01/22/24 0755 59.9 kg (132 lb)   01/22/24 0911 59.9 kg (132 lb)   01/08/24 0840 59.4 kg (131 lb)   01/02/24 0830 57.6 kg (127 lb)   01/02/24 0831 57.6 kg (127 lb)   12/18/23 0759 56.2 kg (124 lb)   12/18/23 0815 56.2 kg (124 lb)   11/27/23 0859 62.1 kg (137 lb)   11/27/23 0902 62.1 kg (137 lb)   11/13/23 0757 62.4 kg (137 lb 8 oz)   11/13/23 0843 62.1 kg (137 lb)   10/30/23 0828 63.5 kg (140 lb)   10/30/23 0958 63.5 kg (140 lb)   10/18/23 1224 64.5 kg (142 lb 3.2 oz)   10/16/23 0812 62.1 kg (137 lb)   10/16/23 0836 62.1 kg (137 lb)      BMI kg/m2 Body mass index is 15.07 kg/m².       Labs        Pertinent Labs Reviewed. Hyponatremia - Management per attending    Results from last 7 days   Lab Units 04/03/24  1641 03/29/24  1422   SODIUM mmol/L 135* 129*   POTASSIUM mmol/L 3.9 " "4.1   CHLORIDE mmol/L 96* 92*   CO2 mmol/L 31.0* 27.0   BUN mg/dL 14 10   CREATININE mg/dL 0.50* 0.56*   CALCIUM mg/dL 10.0 9.4   BILIRUBIN mg/dL 0.3 0.2   ALK PHOS U/L 108 121*   ALT (SGPT) U/L 16 18   AST (SGOT) U/L 26 24   GLUCOSE mg/dL 124* 90     Results from last 7 days   Lab Units 04/03/24  1641   HEMOGLOBIN g/dL 12.3*   HEMATOCRIT % 38.0     No results found for: \"HGBA1C\"     Medications    Scheduled Medications budesonide-formoterol, 2 puff, Inhalation, BID - RT  dexAMETHasone, 6 mg, Intravenous, Q6H  sodium chloride, 10 mL, Intravenous, Q12H        Infusions sodium chloride, 75 mL/hr, Last Rate: 75 mL/hr (04/05/24 0220)        PRN Medications   acetaminophen    albuterol sulfate HFA    senna-docusate sodium **AND** polyethylene glycol **AND** bisacodyl **AND** bisacodyl    Calcium Replacement - Follow Nurse / BPA Driven Protocol    docusate sodium    Magnesium Standard Dose Replacement - Follow Nurse / BPA Driven Protocol    ondansetron ODT **OR** ondansetron    oxyCODONE    Phosphorus Replacement - Follow Nurse / BPA Driven Protocol    Potassium Replacement - Follow Nurse / BPA Driven Protocol    sodium chloride    sodium chloride    traMADol     Physical Findings        Trending Physical   Appearance, NFPE 4/5 NFPE completed, consistent with nutrition diagnosis of malnutrition using AND/ASPEN criteria. See MSA below.      --  Edema  None documented      Bowel Function No BM documented since admit     Tubes No feeding tube in place     Chewing/Swallowing Hx of dysphagia. No current issues reported      Skin Intact     --  Current Nutrition Orders & Evaluation of Intake       Oral Nutrition     Food Allergies NKFA   Current PO Diet Diet: Regular/House; Fluid Consistency: Thin (IDDSI 0)   Supplement none   PO Evaluation     Trending % PO Intake 4/5 62% average intake x last 2 meals documented    --  Nutritional Risk Screening        NRS-2002 Score          Nutrition Diagnosis         Nutrition Dx Problem 1 " Severe chronic disease related malnutrition related to hypermetabolism and chronic sub-optimal po intake  to meet est needs in the setting of metastatic cancer and chronic disease as evidenced by > 5% unintentional weight loss in the last month, po intake meeting <75% of est energy needs for > 1 month, and evidence of severe muscle and fat wasting per NFPE.      Nutrition Dx Problem 2        Intervention Goal         Intervention Goal(s) PO intake > 75% intake at meals.    Prevent weight loss.      Nutrition Intervention        RD Action Continue to encourage good po intake.    NFPE performed.     Encouraged ONS intake after discharge.      Nutrition Prescription          Diet Prescription Regular   Supplement Prescription None - will drink Ensure at home.    --  Monitor/Evaluation        Monitor Per protocol, I&O, PO intake, Supplement intake, Pertinent labs, Weight, Skin status, GI status, Symptoms, POC/GOC, Swallow function, Hemodynamic stability     Malnutrition Severity Assessment      Patient meets criteria for : Severe Malnutrition  Malnutrition Type (Last 8 Hours)       Malnutrition Severity Assessment       Row Name 04/05/24 1042       Malnutrition Severity Assessment    Malnutrition Type Chronic Disease - Related Malnutrition      Row Name 04/05/24 1042       Unintentional Weight Loss     Unintentional Weight Loss Findings Severe    Unintentional Weight Loss  Weight loss greater than 5% in one month      Row Name 04/05/24 1042       Muscle Loss    Loss of Muscle Mass Findings Severe    Buddhist Region Severe - deep hollowing/scooping, lack of muscle to touch, facial bones well defined    Clavicle Bone Region Severe - protruding prominent bone    Acromion Bone Region Severe - squared shoulders, bones, and acromion process protrusion prominent    Scapular Bone Region Severe - prominent bones, depressions easily visible between ribs, scapula, spine, shoulders    Dorsal Hand Region Severe - prominent depression     Patellar Region Severe - prominent bone, square looking, very little muscle definition    Anterior Thigh Region Severe - line/depression along thigh, obviously thin    Posterior Calf Region Severe - thin with very little definition/firmness      Row Name 04/05/24 1042       Fat Loss    Subcutaneous Fat Loss Findings Severe    Orbital Region  Severe - pronounced hollowness/depression, dark circles, loose saggy skin    Upper Arm Region Severe - mostly skin, very little space between folds, fingers touch    Thoracic & Lumbar Region Severe - ribs visible with prominent depressions, iliac crest very prominent      Row Name 04/05/24 1042       Criteria Met (Must meet criteria for severity in at least 2 of these categories: M Wasting, Fat Loss, Fluid, Secondary Signs, Wt. Status, Intake)    Patient meets criteria for  Severe Malnutrition                           Electronically signed by:  Anu Puentes RD  04/05/24 08:35 EDT

## 2024-04-05 NOTE — PROGRESS NOTES
[facility] NEUROSURGERY PROGRESS NOTE    PATIENT IDENTIFICATION:   Name:  Aravind Lim      MRN:  [id number]     73 y.o.  male               CC: Left leg weakness/intracranial metastases      Subjective     Interval History: No acute events overnight.  Patient continues with left leg weakness.  Patient has decided to forego any further treatment for his metastatic esophageal cancer.  He has elected for hospice care.    Objective     Vital signs in last 24 hours:  Temp:  [97.2 °F (36.2 °C)-98 °F (36.7 °C)] 97.2 °F (36.2 °C)  Heart Rate:  [68-91] 68  Resp:  [12-20] 12  BP: (152-174)/(82-88) 174/87  ICP ranges-    Intake/Output this shift:  No intake/output data recorded.  EVD output-    Intake/Output last 3 shifts:  I/O last 3 completed shifts:  In: 1600 [P.O.:600; I.V.:1000]  Out: 300 [Urine:300]    LABS:     No new labs  IMAGING STUDIES:  No new imaging        Meds reviewed/changed: Yes      Physical Exam:    General:   Thin, frail elderly gentleman that is awake, alert, oriented x3. Speech clear with no aphasia  CN III IV VI: Extraocular movements are full , PERRL   CN V: Normal facial sensation and strength of muscles of mastication.  CN VII: Facial movements are symmetric. No weakness.  Motor: 2+/5 lower extremity  Sensation: Normal to light touch; no extinction  Station and Gait: Deferred   coordination: Finger-to-nose  shows no dysmetria.   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   Patient was offered palliative resection of his newly discovered intracranial mass in hopes of recovering some meaningful quality of life in regards to his left leg weakness.  Patient remains completely alert oriented on evaluation today and has opted for full hospice care  "which is reasonable.  Neurosurgery will sign off at this time but is available for any questions or concerns.      Brain edema    Metastatic esophageal adenocarcinoma    PLAN:     -No further neurosurgical workup or plans as patient has opted for hospice care.  - Please call for any questions or concerns.    I discussed the patient's findings and my recommendations with patient, family, and nursing staff and Dr. Morgan.       LOS: 2 days       Catalina Olivarez, APRN  4/5/2024  07:41 EDT    \"Dictated utilizing Dragon dictation\".      "

## 2024-04-05 NOTE — DISCHARGE SUMMARY
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  Patient has been on treatment with FOLFOX Opdivo with good response initially and kept on maintenance with 5-FU leucovorin and Opdivo  Patient presented with left leg pain and numbness and weakness for last few days  Patient will be discharged home with hospice  Patient had osseous metastasis to L4 and T2  Date of Discharge:    Primary Care Physician: Abiodun Steele MD      Presenting Problem:   Brain edema [G93.6]  Metastatic adenocarcinoma to brain [C79.31]    Active and Resolved Hospital Problems:  Active Hospital Problems    Diagnosis POA    **Brain edema [G93.6] Yes      Resolved Hospital Problems   No resolved problems to display.         Hospital Course     Hospital Course:  Aravind Lim is a 73 y.o. 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  Patient decided to go with hospice and comfort measures  Patient can be discharged home with hospice as per his choice  He was offered radiation treatment but he declined  Patient had MRI of the brain showing enhancing mass in the right frontal lobe and surrounding vasogenic edema likely representing metastatic disease        DISCHARGE Follow Up Recommendations for labs and diagnostics: pcp in 1 w and with hospice      Reasons For Change In Medications and Indications for New Medications:      Day of Discharge     Vital Signs:  Temp:  [97.2 °F (36.2 °C)-98 °F (36.7 °C)] 97.9 °F (36.6 °C)  Heart Rate:  [68-87] 78  Resp:  [12-20] 12  BP: (152-174)/(82-89) 159/89    Physical Exam:  Physical Exam   Awake alert oriented  HEENT exam is  normal  Neck supple  Chest is adulteration  Heart S1-S2 no murmur  Abdomen soft benign nontender bowel sounds positive      Pertinent  and/or Most Recent Results     LAB RESULTS:      Lab 04/03/24  1641   WBC 8.90   HEMOGLOBIN 12.3*   HEMATOCRIT 38.0   PLATELETS 262   NEUTROS ABS 6.81   IMMATURE GRANS (ABS) 0.03   LYMPHS ABS 1.19   MONOS ABS 0.77   EOS ABS 0.06   MCV 91.3   PROTIME 10.3         Lab 04/03/24  1641 03/29/24  1422   SODIUM 135* 129*   POTASSIUM 3.9 4.1   CHLORIDE 96* 92*   CO2 31.0* 27.0   ANION GAP 8.0 10.0   BUN 14 10   CREATININE 0.50* 0.56*   EGFR 108.4 104.7   GLUCOSE 124* 90   CALCIUM 10.0 9.4         Lab 04/03/24  1641 03/29/24  1422   TOTAL PROTEIN 7.4 7.2   ALBUMIN 4.3 4.1   GLOBULIN 3.1 3.1   ALT (SGPT) 16 18   AST (SGOT) 26 24   BILIRUBIN 0.3 0.2   ALK PHOS 108 121*         Lab 04/03/24  1641   PROTIME 10.3   INR 0.94                 Brief Urine Lab Results       None          Microbiology Results (last 10 days)       ** No results found for the last 240 hours. **            MRI Cervical Spine With & Without Contrast    Result Date: 4/4/2024  Impression: Impression: Numerous enhancing lesions are seen throughout the cervical and thoracic spinal cord, as well as along the cauda equina nerve roots concerning for areas of likely both intramedullary and leptomeningeal metastatic disease. There is no evidence of significant associated surrounding cord edema or cord compression. Redemonstrated osseous metastasis at L4 without associated pathologic fracture. There is also questionable osseous metastasis at T2. Generally mild spondylosis is present throughout without areas of high-grade spinal canal or neuroforaminal impingement. Electronically Signed: Jose Saini MD  4/4/2024 8:57 AM EDT  Workstation ID: [id number]    MRI Thoracic Spine With & Without Contrast    Result Date: 4/4/2024  Impression: Impression: Numerous enhancing lesions are seen throughout the cervical and thoracic spinal cord, as  well as along the cauda equina nerve roots concerning for areas of likely both intramedullary and leptomeningeal metastatic disease. There is no evidence of significant associated surrounding cord edema or cord compression. Redemonstrated osseous metastasis at L4 without associated pathologic fracture. There is also questionable osseous metastasis at T2. Generally mild spondylosis is present throughout without areas of high-grade spinal canal or neuroforaminal impingement. Electronically Signed: Jose Saini MD  4/4/2024 8:57 AM EDT  Workstation ID: [id number]    MRI Lumbar Spine With & Without Contrast    Result Date: 4/4/2024  Impression: Impression: Numerous enhancing lesions are seen throughout the cervical and thoracic spinal cord, as well as along the cauda equina nerve roots concerning for areas of likely both intramedullary and leptomeningeal metastatic disease. There is no evidence of significant associated surrounding cord edema or cord compression. Redemonstrated osseous metastasis at L4 without associated pathologic fracture. There is also questionable osseous metastasis at T2. Generally mild spondylosis is present throughout without areas of high-grade spinal canal or neuroforaminal impingement. Electronically Signed: Jose Saini MD  4/4/2024 8:57 AM EDT  Workstation ID: [id number]    MRI Brain With & Without Contrast    Result Date: 4/4/2024  Impression: Impression: Enhancing mass seen within the high right frontal lobe with surrounding vasogenic edema likely representing metastatic disease. An additional smaller mass is seen just inferiorly and laterally. No additional enhancing masses identified. No evidence of hemorrhage, or midline shift. No evidence of recent or acute ischemia. Electronically Signed: Annie Avila MD  4/4/2024 2:52 AM EDT  Workstation ID: [id number]    CT Lumbar Spine Without Contrast    Result Date: 4/3/2024  Impression: Impression: 1. New 7 mm pulmonary nodule at the left lung  base with spiculated borders concerning for metastatic disease. 2. Multilevel degenerative changes in the lumbar spine. At level L4/5 there is moderate to severe narrowing of the right neural foramen with suspected contact with the exiting right L4 nerve root. 3. Urinary bladder distention extending into the lower abdomen. 4. Infrarenal abdominal aortic aneurysm measuring 3.3 cm. 5. Heterogeneous osseous lesion in the L4 vertebral body stable in appearance without pathologic fracture. Electronically Signed: Jade Slaughter MD  4/3/2024 2:14 PM EDT  Workstation ID: [id number]    CT Head Without Contrast    Result Date: 4/3/2024  Impression: Impression: Right posterior frontal mass with vasogenic edema, suggestive of  intracranial metastasis in a patient with known primary malignancy. MRI of the brain with and without contrast is recommended for further assessment These findings were discussed with Dr. Joyce in the emergency department on 4/3/2024 at 2:05 p.m. eastern standard time. Electronically Signed: Charlene Mcconnell MD  4/3/2024 2:06 PM EDT  Workstation ID: [id number]                 Labs Pending at Discharge:      Procedures Performed           Consults:   Consults       Date and Time Order Name Status Description    4/4/2024  7:45 AM Inpatient Radiation Oncology Consult Completed     4/3/2024  2:45 PM Hematology and Oncology (on-call MD unless specified) Completed     4/3/2024  2:38 PM Hematology & Oncology Inpatient Consult Completed     4/3/2024  2:38 PM Inpatient Neurosurgery Consult Completed     4/3/2024  2:10 PM Hospitalist (on-call MD unless specified)                Discharge Details        Discharge Medications        Continue These Medications        Instructions Start Date   albuterol sulfate  (90 Base) MCG/ACT inhaler  Commonly known as: PROVENTIL HFA;VENTOLIN HFA;PROAIR HFA   2 puffs, Inhalation, Every 4 Hours PRN      budesonide-formoterol 160-4.5 MCG/ACT inhaler  Commonly known as: SYMBICORT    2 puffs, Inhalation, 2 Times Daily      docusate sodium 100 MG capsule  Commonly known as: Colace   100 mg, Oral, Every Night at Bedtime      mineral oil liquid   30 mL, Oral, Nightly      ondansetron 8 MG tablet  Commonly known as: ZOFRAN   8 mg, Oral, 3 Times Daily PRN      promethazine 25 MG tablet  Commonly known as: PHENERGAN   25 mg, Oral, Every 6 Hours PRN      traMADol 50 MG tablet  Commonly known as: ULTRAM   50 mg, Oral, Every 6 Hours PRN               No Known Allergies      Discharge Disposition: home with hospice      Diet:  Hospital:  Diet Order   Procedures    Diet: Regular/House; Fluid Consistency: Thin (IDDSI 0)         Discharge Activity:         CODE STATUS:  Code Status and Medical Interventions:   Ordered at: 04/03/24 1639     Level Of Support Discussed With:    Patient     Code Status (Patient has no pulse and is not breathing):    CPR (Attempt to Resuscitate)     Medical Interventions (Patient has pulse or is breathing):    Full Support         Future Appointments   Date Time Provider Department Center   [facility]   5/1/2024  8:45 AM [place]ito [place] [facility]       Additional Instructions for the Follow-ups that You Need to Schedule       Ambulatory Referral to Home Health (Hospital)   As directed      Face to Face Visit Date: 4/4/2024   Follow-up provider for Plan of Care?: I treated the patient in an acute care facility and will not continue treatment after discharge.   Follow-up provider: SOPHIE BENITO [[id number]]   Reason/Clinical Findings: diifficulty ambulation   Describe mobility limitations that make leaving home difficult: weakeness   Nursing/Therapeutic Services Requested: Physical Therapy   Frequency: 1 Week 1                Time spent on Discharge including face to face service:  35 minutes          Signature: Electronically signed by Gerry Aranda MD, 04/05/24, 09:34 EDT.  [facility] Hospitalist Team

## 2024-04-05 NOTE — SIGNIFICANT NOTE
04/05/24 1139   OTHER   Discipline physical therapy assistant   Rehab Time/Intention   Session Not Performed other (see comments)  (Pt discharging home with hospice)   Recommendation   PT - Next Appointment 04/08/24

## 2024-04-05 NOTE — CASE MANAGEMENT/SOCIAL WORK
Case Management Discharge Note      Final Note: Home with [facility]         Selected Continued Care - Discharged on 4/5/2024 Admission date: 4/3/2024 - Discharge disposition: Home or Self Care        Home Medical Care Coordination complete.      Service Provider Selected Services Address Phone Fax Patient Preferred    [facility] [address] [place] [phone] -- --                      Transportation Services  Private: Car    Final Discharge Disposition Code: 50 - home with hospice

## 2024-04-06 NOTE — PROGRESS NOTES
Enter Query Response Below      Query Response: Chronic disease related severe malnutrition              If applicable, please update the problem list.     Patient: Aravind Lim        : 1950  Account: [id number]           Admit Date:         How to Respond to this query:       a. Click New Note     b. Answer query within the yellow box.                c. Update the Problem List, if applicable.      If you have any questions about this query contact me at: [email or web address]     :     73 year old male admitted with left lower extremity weakness, vasogenic edema.  Clinical indicators: History of adenocarcinoma of the esophagus, GERD.  H&P Physical exam: Mouth- Mucous membranes are moist.  Bowel sounds are normal.  Abdomen is soft.    Neck supple.  Skin is warm.  /3 CT of the head-Right posterior frontal mass with vasogenic edema, suggestive of intracranial metastasis in a patient with known primary malignancy.    Registered Dietician notes: Patient meets criteria for severe malnutrition. Malnutrition type: chronic disease-related malnutrition. Severe weight loss > 5% in one month. Severe muscle loss: temple, clavicle bone, acromion bone, scapular bone, dorsal hand, patellar, anterior thigh, posterior calf regions.  Severe fat loss: orbital, upper arm, thoracic and lumbar regions.  Discharge summary: Patient decided to go with hospice and comfort measures.  Treatments/monitoring: iv zofran prn nausea and vomiting, oral care, intake and output, bmp, cbc, encourage po intake, discharge home with hospice.    After study, please further specify:    Chronic disease related severe malnutrition  Normal body habitus without malnutrition  Other ____________________  Unable to determine    By submitting this query, we are merely seeking further clarification of documentation to accurately reflect all conditions that you are monitoring, evaluating, treating or that extend the hospitalization or utilize  additional resources of care. Please utilize your independent clinical judgment when addressing the question(s) above.     This query and your response, once completed, will be entered into the legal medical record.    Sincerely,  Harmony Che RN  Clinical Documentation Integrity Program

## 2024-04-08 DIAGNOSIS — K22.89 ESOPHAGEAL MASS: ICD-10-CM

## 2024-04-08 RX ORDER — TRAMADOL HYDROCHLORIDE 50 MG/1
50 TABLET ORAL EVERY 6 HOURS PRN
Qty: 30 TABLET | Refills: 0 | Status: CANCELLED | OUTPATIENT
Start: 2024-04-08